# Patient Record
Sex: MALE | Race: WHITE | Employment: OTHER | URBAN - NONMETROPOLITAN AREA
[De-identification: names, ages, dates, MRNs, and addresses within clinical notes are randomized per-mention and may not be internally consistent; named-entity substitution may affect disease eponyms.]

---

## 2017-02-27 ENCOUNTER — OFFICE VISIT (OUTPATIENT)
Dept: UROLOGY | Age: 67
End: 2017-02-27

## 2017-02-27 ENCOUNTER — TELEPHONE (OUTPATIENT)
Dept: UROLOGY | Age: 67
End: 2017-02-27

## 2017-02-27 VITALS
SYSTOLIC BLOOD PRESSURE: 126 MMHG | WEIGHT: 224 LBS | DIASTOLIC BLOOD PRESSURE: 80 MMHG | HEIGHT: 69 IN | BODY MASS INDEX: 33.18 KG/M2

## 2017-02-27 DIAGNOSIS — N50.89 SCROTAL SWELLING: Primary | ICD-10-CM

## 2017-02-27 LAB
BILIRUBIN URINE: NEGATIVE
BLOOD URINE, POC: NORMAL
CHARACTER, URINE: CLEAR
COLOR, URINE: YELLOW
GLUCOSE URINE: NEGATIVE MG/DL
KETONES, URINE: NEGATIVE
LEUKOCYTE CLUMPS, URINE: NORMAL
NITRITE, URINE: NEGATIVE
PH, URINE: 5.5
PROTEIN, URINE: NEGATIVE MG/DL
SPECIFIC GRAVITY, URINE: >= 1.03 (ref 1–1.03)
UROBILINOGEN, URINE: 1 EU/DL

## 2017-02-27 PROCEDURE — 81003 URINALYSIS AUTO W/O SCOPE: CPT | Performed by: UROLOGY

## 2017-02-27 PROCEDURE — 99214 OFFICE O/P EST MOD 30 MIN: CPT | Performed by: UROLOGY

## 2017-02-27 ASSESSMENT — ENCOUNTER SYMPTOMS
SHORTNESS OF BREATH: 0
CONSTIPATION: 1
DIARRHEA: 0
CHEST TIGHTNESS: 0

## 2017-03-08 ENCOUNTER — OFFICE VISIT (OUTPATIENT)
Age: 67
End: 2017-03-08

## 2017-03-08 VITALS
HEART RATE: 57 BPM | SYSTOLIC BLOOD PRESSURE: 118 MMHG | RESPIRATION RATE: 16 BRPM | BODY MASS INDEX: 33.13 KG/M2 | TEMPERATURE: 97 F | DIASTOLIC BLOOD PRESSURE: 74 MMHG | WEIGHT: 223.7 LBS | HEIGHT: 69 IN | OXYGEN SATURATION: 96 %

## 2017-03-08 DIAGNOSIS — D17.20 LIPOMA OF SHOULDER: Primary | ICD-10-CM

## 2017-03-08 PROCEDURE — 99203 OFFICE O/P NEW LOW 30 MIN: CPT | Performed by: SURGERY

## 2017-03-08 ASSESSMENT — ENCOUNTER SYMPTOMS
ANAL BLEEDING: 0
APNEA: 0
ABDOMINAL PAIN: 0
CHOKING: 0
VOMITING: 0
COUGH: 0
RHINORRHEA: 0
SHORTNESS OF BREATH: 0
CONSTIPATION: 0
STRIDOR: 0
EYE PAIN: 0
EYE ITCHING: 0
SORE THROAT: 0
DIARRHEA: 0
SINUS PRESSURE: 0
CHEST TIGHTNESS: 0
NAUSEA: 0
WHEEZING: 0
ABDOMINAL DISTENTION: 0
EYE REDNESS: 0
EYE DISCHARGE: 0
COLOR CHANGE: 0
TROUBLE SWALLOWING: 0
BLOOD IN STOOL: 0
VOICE CHANGE: 0
RECTAL PAIN: 0
FACIAL SWELLING: 0
PHOTOPHOBIA: 0

## 2017-03-13 ENCOUNTER — OFFICE VISIT (OUTPATIENT)
Dept: CARDIOLOGY | Age: 67
End: 2017-03-13

## 2017-03-13 VITALS
SYSTOLIC BLOOD PRESSURE: 146 MMHG | WEIGHT: 224 LBS | DIASTOLIC BLOOD PRESSURE: 74 MMHG | HEART RATE: 70 BPM | HEIGHT: 71 IN | BODY MASS INDEX: 31.36 KG/M2

## 2017-03-13 DIAGNOSIS — I48.0 PAF (PAROXYSMAL ATRIAL FIBRILLATION) (HCC): ICD-10-CM

## 2017-03-13 DIAGNOSIS — R94.31 ABNORMAL EKG: ICD-10-CM

## 2017-03-13 DIAGNOSIS — I48.91 ATRIAL FIBRILLATION WITH RAPID VENTRICULAR RESPONSE (HCC): ICD-10-CM

## 2017-03-13 DIAGNOSIS — I10 ESSENTIAL HYPERTENSION: Primary | ICD-10-CM

## 2017-03-13 DIAGNOSIS — Z01.810 PREOP CARDIOVASCULAR EXAM: ICD-10-CM

## 2017-03-13 PROCEDURE — 99203 OFFICE O/P NEW LOW 30 MIN: CPT | Performed by: INTERNAL MEDICINE

## 2017-03-13 PROCEDURE — 93000 ELECTROCARDIOGRAM COMPLETE: CPT | Performed by: INTERNAL MEDICINE

## 2017-03-15 ENCOUNTER — TELEPHONE (OUTPATIENT)
Dept: UROLOGY | Age: 67
End: 2017-03-15

## 2017-04-03 ENCOUNTER — TELEPHONE (OUTPATIENT)
Dept: UROLOGY | Age: 67
End: 2017-04-03

## 2017-04-03 DIAGNOSIS — Z01.818 PRE-OP TESTING: ICD-10-CM

## 2017-04-03 DIAGNOSIS — N43.3 HYDROCELE, LEFT: Primary | ICD-10-CM

## 2017-04-28 ENCOUNTER — TELEPHONE (OUTPATIENT)
Dept: UROLOGY | Age: 67
End: 2017-04-28

## 2017-04-28 ENCOUNTER — TELEPHONE (OUTPATIENT)
Age: 67
End: 2017-04-28

## 2017-04-28 RX ORDER — OXYCODONE HYDROCHLORIDE AND ACETAMINOPHEN 5; 325 MG/1; MG/1
1-2 TABLET ORAL EVERY 6 HOURS PRN
Qty: 20 TABLET | Refills: 0 | Status: SHIPPED | OUTPATIENT
Start: 2017-04-28 | End: 2017-05-05

## 2017-05-01 ENCOUNTER — NURSE ONLY (OUTPATIENT)
Dept: UROLOGY | Age: 67
End: 2017-05-01

## 2017-05-01 DIAGNOSIS — N43.3 HYDROCELE, UNSPECIFIED HYDROCELE TYPE: Primary | ICD-10-CM

## 2017-05-10 ENCOUNTER — OFFICE VISIT (OUTPATIENT)
Dept: UROLOGY | Age: 67
End: 2017-05-10

## 2017-05-10 VITALS
SYSTOLIC BLOOD PRESSURE: 138 MMHG | BODY MASS INDEX: 30.8 KG/M2 | DIASTOLIC BLOOD PRESSURE: 76 MMHG | HEIGHT: 71 IN | WEIGHT: 220 LBS

## 2017-05-10 DIAGNOSIS — N43.3 HYDROCELE, UNSPECIFIED HYDROCELE TYPE: Primary | ICD-10-CM

## 2017-05-10 DIAGNOSIS — K59.00 CONSTIPATION, UNSPECIFIED CONSTIPATION TYPE: ICD-10-CM

## 2017-05-10 LAB
BILIRUBIN URINE: NEGATIVE
BLOOD URINE, POC: NEGATIVE
CHARACTER, URINE: CLEAR
COLOR, URINE: YELLOW
GLUCOSE URINE: NEGATIVE MG/DL
KETONES, URINE: NEGATIVE
LEUKOCYTE CLUMPS, URINE: NEGATIVE
NITRITE, URINE: NEGATIVE
PH, URINE: 5.5
PROTEIN, URINE: NEGATIVE MG/DL
SPECIFIC GRAVITY, URINE: 1.01 (ref 1–1.03)
UROBILINOGEN, URINE: 1 EU/DL

## 2017-05-10 PROCEDURE — 99024 POSTOP FOLLOW-UP VISIT: CPT | Performed by: NURSE PRACTITIONER

## 2017-05-10 PROCEDURE — 81003 URINALYSIS AUTO W/O SCOPE: CPT | Performed by: NURSE PRACTITIONER

## 2017-05-10 RX ORDER — CLOPIDOGREL BISULFATE 75 MG/1
75 TABLET ORAL DAILY
COMMUNITY
End: 2018-08-16 | Stop reason: ALTCHOICE

## 2017-05-10 RX ORDER — POLYETHYLENE GLYCOL 3350 17 G/17G
17 POWDER, FOR SOLUTION ORAL DAILY PRN
Qty: 1 BOTTLE | Refills: 0 | COMMUNITY
Start: 2017-05-10 | End: 2017-05-15

## 2017-05-10 RX ORDER — OXYCODONE HYDROCHLORIDE AND ACETAMINOPHEN 5; 325 MG/1; MG/1
2 TABLET ORAL EVERY 6 HOURS PRN
COMMUNITY
End: 2017-05-15

## 2017-05-15 ENCOUNTER — OFFICE VISIT (OUTPATIENT)
Age: 67
End: 2017-05-15

## 2017-05-15 VITALS
TEMPERATURE: 97.7 F | DIASTOLIC BLOOD PRESSURE: 80 MMHG | BODY MASS INDEX: 31.27 KG/M2 | SYSTOLIC BLOOD PRESSURE: 136 MMHG | HEIGHT: 71 IN | WEIGHT: 223.4 LBS

## 2017-05-15 DIAGNOSIS — Z98.890 S/P EXCISION OF LIPOMA: Primary | ICD-10-CM

## 2017-05-15 DIAGNOSIS — Z86.018 S/P EXCISION OF LIPOMA: Primary | ICD-10-CM

## 2017-05-15 PROCEDURE — 99024 POSTOP FOLLOW-UP VISIT: CPT | Performed by: NURSE PRACTITIONER

## 2017-05-15 RX ORDER — ASPIRIN 81 MG/1
81 TABLET ORAL DAILY
COMMUNITY

## 2017-05-15 ASSESSMENT — ENCOUNTER SYMPTOMS
EYE REDNESS: 0
ANAL BLEEDING: 0
RHINORRHEA: 0
ABDOMINAL DISTENTION: 0
WHEEZING: 0
EYE DISCHARGE: 0
BLOOD IN STOOL: 0
COUGH: 0
APNEA: 0
VOMITING: 0
STRIDOR: 0
ABDOMINAL PAIN: 0
SHORTNESS OF BREATH: 0
TROUBLE SWALLOWING: 0
COLOR CHANGE: 0
SORE THROAT: 0
EYE PAIN: 0
BACK PAIN: 0
CHEST TIGHTNESS: 0
VOICE CHANGE: 0
DIARRHEA: 0
CONSTIPATION: 0
CHOKING: 0
FACIAL SWELLING: 0
EYE ITCHING: 0
NAUSEA: 0
SINUS PRESSURE: 0
PHOTOPHOBIA: 0
RECTAL PAIN: 0

## 2017-05-24 ENCOUNTER — OFFICE VISIT (OUTPATIENT)
Dept: UROLOGY | Age: 67
End: 2017-05-24

## 2017-05-24 VITALS
HEIGHT: 70 IN | WEIGHT: 220 LBS | SYSTOLIC BLOOD PRESSURE: 128 MMHG | BODY MASS INDEX: 31.5 KG/M2 | DIASTOLIC BLOOD PRESSURE: 82 MMHG

## 2017-05-24 DIAGNOSIS — N43.2 OTHER HYDROCELE: Primary | ICD-10-CM

## 2017-05-24 PROCEDURE — 99024 POSTOP FOLLOW-UP VISIT: CPT | Performed by: NURSE PRACTITIONER

## 2017-06-07 ENCOUNTER — OFFICE VISIT (OUTPATIENT)
Dept: UROLOGY | Age: 67
End: 2017-06-07

## 2017-06-07 VITALS
DIASTOLIC BLOOD PRESSURE: 64 MMHG | WEIGHT: 220 LBS | SYSTOLIC BLOOD PRESSURE: 116 MMHG | HEIGHT: 70 IN | BODY MASS INDEX: 31.5 KG/M2

## 2017-06-07 DIAGNOSIS — N43.0 ENCYSTED HYDROCELE: Primary | ICD-10-CM

## 2017-06-07 LAB
BILIRUBIN URINE: NEGATIVE
BLOOD URINE, POC: NEGATIVE
CHARACTER, URINE: CLEAR
COLOR, URINE: YELLOW
GLUCOSE URINE: NEGATIVE MG/DL
KETONES, URINE: NEGATIVE
LEUKOCYTE CLUMPS, URINE: NEGATIVE
NITRITE, URINE: NEGATIVE
PH, URINE: 6
PROTEIN, URINE: NEGATIVE MG/DL
SPECIFIC GRAVITY, URINE: 1.02 (ref 1–1.03)
UROBILINOGEN, URINE: 1 EU/DL

## 2017-06-07 PROCEDURE — 99024 POSTOP FOLLOW-UP VISIT: CPT | Performed by: NURSE PRACTITIONER

## 2017-06-07 PROCEDURE — 81003 URINALYSIS AUTO W/O SCOPE: CPT | Performed by: NURSE PRACTITIONER

## 2017-11-09 ENCOUNTER — OFFICE VISIT (OUTPATIENT)
Dept: CARDIOLOGY CLINIC | Age: 67
End: 2017-11-09
Payer: MEDICAID

## 2017-11-09 VITALS
SYSTOLIC BLOOD PRESSURE: 124 MMHG | HEIGHT: 71 IN | HEART RATE: 84 BPM | WEIGHT: 224 LBS | BODY MASS INDEX: 31.36 KG/M2 | DIASTOLIC BLOOD PRESSURE: 68 MMHG

## 2017-11-09 DIAGNOSIS — I48.91 ATRIAL FIBRILLATION WITH RAPID VENTRICULAR RESPONSE (HCC): ICD-10-CM

## 2017-11-09 DIAGNOSIS — I10 ESSENTIAL HYPERTENSION: ICD-10-CM

## 2017-11-09 DIAGNOSIS — I48.0 PAF (PAROXYSMAL ATRIAL FIBRILLATION) (HCC): Primary | ICD-10-CM

## 2017-11-09 PROCEDURE — 99213 OFFICE O/P EST LOW 20 MIN: CPT | Performed by: INTERNAL MEDICINE

## 2017-11-09 PROCEDURE — 93000 ELECTROCARDIOGRAM COMPLETE: CPT | Performed by: INTERNAL MEDICINE

## 2017-11-09 NOTE — PROGRESS NOTES
Sadia Varghese is a 74 y.o. male is here for  and has had no chest pains . of afib and   Intensity of the pain  None  provocation of the pain none , what relieves the pain none  where does  the pain migrates to none  And no  nausea no fever and chills and no sob with and without activity. No evidence of swelling in legs no palpitations and no syncopal episodes and no dizziness ,no bleeding ,or urination  Problems ,no double visions ,no speech problems and no bowel problems or diarrhea and tolerating medications     Patient Active Problem List   Diagnosis    Hypertension    Hx of Atrial fibrillation with rapid ventricular response august 2011    CVA with RT sided hemiparesis and Broca's Aphasia    PAF (paroxysmal atrial fibrillation) with CVR on 07/12/12    Noncompliance    Hx of Chest pain-stress test negative     Encysted hydrocele    Preop cardiovascular exam    Lipoma of right upper extremity     Past Medical History:   Diagnosis Date    Atrial fibrillation with rapid ventricular response ([address]     Hypertension     Irregular heart beat     Stroke [facility]) 2012    Unspecified cerebral artery occlusion with cerebral infarction      Social History   Substance Use Topics    Smoking status: Current Every Day Smoker     Packs/day: 1.50     Years: 45.00    Smokeless tobacco: Never Used    Alcohol use Yes      Comment: couple times week     No Known Allergies  Current Outpatient Prescriptions   Medication Sig Dispense Refill    aspirin 81 MG EC tablet Take 81 mg by mouth daily      clopidogrel (PLAVIX) 75 MG tablet Take 75 mg by mouth daily      sertraline (ZOLOFT) 50 MG tablet Take 50 mg by mouth daily      atorvastatin (LIPITOR) 40 MG tablet Take 1 tablet by mouth nightly. 30 tablet 0    metoprolol (LOPRESSOR) 50 MG tablet Take 1 tablet by mouth 2 times daily. 60 tablet 0    diltiazem (CARDIZEM CD) 180 MG ER capsule Take 1 capsule by mouth daily.  30 capsule 0     No current facility-administered medications for this visit. REVIEW OF SYSTEM  Constitutional  symptoms such as fever chills and malaise and weakness  Are negative   HE ENT negative  HEART all negative  LUNGS all negative   ABDOMEN all negative  GENITAL URINARY all within normal limits  NEUROLOGY all negative  NECK all negative   SKIN all negative  MUSCULOSKELETAL negative  HEMATOLOGICAL negative  PSYCHIATRIC  negative    Vitals:    11/09/17 1535   BP: 124/68   Pulse: 84   Weight: 224 lb (101.6 kg)   Height: 5' 11\" (1.803 m)       EXAMINATION   Gen.  Appearance is reflective of nutritional normal nutrition and well-groomed   Appears  stated age    Carotid the amplitude of  carotid artery  And up stroke are present or diminished and bruits are absent   Thyroid palpation appears to be  Normal    GIBRAN  And evaluated and within normal limits  And size of pupils is normal  HEENT  teeth appears to be symmetrical without poor dentition and gums  and palate appears to be healthy and  head is normal cephalic  Without signs of trauma and eyes are without trauma no conjunctiva and Iids retracting and not retracted ptosis and no ptosis and without  erythematous changes and  Nose is symmetrical  without drainage  and ears are  without tinnitus  and throat is clear   JUGULAR VEINS  are not elevated and tongue is mid line no masses presence and no staples A waves present   LYMPHATICS are without adenopathies at least on exam   CHEST  No biventricular heaves and thrills and no right ventricular heaves and examination without signs of trauma and lesions  HEART not distant and regular rate and rhythm without   gallop signs and and no murmurs and systolic crescendo  Decrescendo  normal s1 and s2 sounds and no s3 and s4 split   Point of maximum intensity of the heartbeat  is at or displaced from the fifth intercostal space  LUNGS no   presence of intercostal retractions and no  use of the accessory muscles with a diaphragmatic movement present and not present pectus and pigeon chest and with  wheezes and with  rhonchi and non diminished in all posterior lungs  and without rales  ABDOMEN abdominal bruit not present  And  No  Presency of  morbid obesity and with no    non distended without organomegaly and no rebound tenderness and bowel sound are present  all quadrants   NEUROLOGY all the cranial nerves are intact and no motor deficits  and no sensory deficits  MUSCULAR SKELETAL without signs of trauma and kyphosis and scoliosis and normal range of motion for all extremities  INTEGUMENTARY without tenting and skin rashes indentation and  dryness  NECK without lymph adenopathy   NEUROPSYCHIATRIC has no anxiety, no mood swings and depression  VASCULAR EXAM for carotid ,radial femoral arteries are  steady  and not diminished   Extremities no evidence of peripheral edema  And pulses are  normal    Assessment and plans  1. PAF (paroxysmal atrial fibrillation) with CVR on 07/12/12  EKG 12 Lead   2. Essential hypertension  EKG 12 Lead   3. Hx of Atrial fibrillation with rapid ventricular response august 2011  EKG 12 Lead     EKG was reviewed and shows afib with rate of 73    Patient was advised to return in 3 to 6 months for follow up or sooner if there is any change in care.   Patient appears to have been atrial fibrillation previously is on Plavix and there is no data to show that he was given stronger anti-platelet medication  I have reviewed his chart julia  Concerned  that he will be at risk of embolic phenomena    And  not on stronger anticoagulation therefore I have suggested consideration for eliquis  5 twice a day but he lives on his own  We will consider stopping Plavix if patient is able to get eliquis and  Because he  is noncompliant antiplatelet such as Coumadin is not  effective in him    Patient is not on long-term anticoagulation and this is a discussed with him extensively and he understands the risk of embolic phenomena    We'll get a 12-lead EKG and assess what

## 2018-02-22 ENCOUNTER — TELEPHONE (OUTPATIENT)
Dept: CARDIOLOGY CLINIC | Age: 68
End: 2018-02-22

## 2018-07-05 ENCOUNTER — OFFICE VISIT (OUTPATIENT)
Dept: SURGERY | Age: 68
End: 2018-07-05

## 2018-07-05 ENCOUNTER — TELEPHONE (OUTPATIENT)
Dept: SURGERY | Age: 68
End: 2018-07-05

## 2018-07-05 VITALS
HEIGHT: 71 IN | DIASTOLIC BLOOD PRESSURE: 76 MMHG | RESPIRATION RATE: 18 BRPM | TEMPERATURE: 98.7 F | SYSTOLIC BLOOD PRESSURE: 138 MMHG | BODY MASS INDEX: 32.9 KG/M2 | WEIGHT: 235 LBS | OXYGEN SATURATION: 96 % | HEART RATE: 74 BPM

## 2018-07-05 DIAGNOSIS — Z12.11 ENCOUNTER FOR SCREENING COLONOSCOPY: Primary | ICD-10-CM

## 2018-07-05 PROCEDURE — 99999 PR OFFICE/OUTPT VISIT,PROCEDURE ONLY: CPT | Performed by: SURGERY

## 2018-07-05 ASSESSMENT — ENCOUNTER SYMPTOMS
ANAL BLEEDING: 0
APNEA: 0
ABDOMINAL DISTENTION: 0
ABDOMINAL PAIN: 0
COUGH: 0
EYE ITCHING: 0
WHEEZING: 0
STRIDOR: 0
SHORTNESS OF BREATH: 0
TROUBLE SWALLOWING: 0
BACK PAIN: 0
SINUS PRESSURE: 0
COLOR CHANGE: 0
VOICE CHANGE: 0
PHOTOPHOBIA: 0
CHOKING: 0
CHEST TIGHTNESS: 0
EYE REDNESS: 0
EYE PAIN: 0
SORE THROAT: 0
EYE DISCHARGE: 0
SINUS PAIN: 0
FACIAL SWELLING: 0
RHINORRHEA: 0

## 2018-07-05 NOTE — PROGRESS NOTES
[facility]  [facility]  [address] [address]  [address] [facility]  Dept: [phone]  Dept Fax: [phone]  Loc: [phone]    Visit Date: 7/5/2018    Marisol Pacheco is a 74 y.o. male who presents today for:  Chief Complaint   Patient presents with   [facility] Consult     new pt-ref. Lizzie Jimenesstorm-screening colonoscopy-no previous-family history of colon cancer---pt did not bring list of meds       HPI:     HPI 73-year-old white male whose father had colon carcinoma and who is never had a screening colonoscopy.   Patient denies any blood in his stool denies any weight loss abdominal cramping he denies any rectal prolapse or anal pain with bowel movements patient has had a stroke does have a history of atrial fibrillation he denies any change of bowel habits    Past Medical History:   Diagnosis Date    Atrial fibrillation with rapid ventricular response ([address]     Hypertension     Irregular heart beat     [facility]roke [facility]) 2012    Unspecified cerebral artery occlusion with cerebral infarction       Past Surgical History:   Procedure Laterality Date    CARDIOVASCULAR STRESS TEST  08/22/2011    EF 43% SUGGESTS ECHO    HYDROCELE EXCISION Left 12/6/13    Left Scrotal Exploration    HYDROCELE EXCISION  04/27/2017    LIPOMA RESECTION  04/27/2017    right shoulder lipoma resection    TESTICLE SURGERY  2015    TRANSTHORACIC ECHOCARDIOGRAM  08/23/2011    EF 50-55% A FIB C RVR,CONTROLLED HTN/STUDY WAS WITHIN NORMAL LIMITS     Family History   Problem Relation Age of Onset    Heart Disease Mother     Diabetes Mother     Cancer Father     Diabetes Father      Social History   Substance Use Topics    Smoking status: Current Every Day Smoker     Packs/day: 1.50     Years: 45.00    Smokeless tobacco: Never Used    Alcohol use Yes      Comment: couple times week       Current Outpatient Prescriptions   Medication Sig Dispense Refill    aspirin 81 MG EC tablet Take 81 mg by mouth daily      clopidogrel (PLAVIX) 75 MG tablet Take 75 mg by mouth daily      atorvastatin (LIPITOR) 40 MG tablet Take 1 tablet by mouth nightly. 30 tablet 0    metoprolol (LOPRESSOR) 50 MG tablet Take 1 tablet by mouth 2 times daily. 60 tablet 0    diltiazem (CARDIZEM CD) 180 MG ER capsule Take 1 capsule by mouth daily. 30 capsule 0    apixaban (ELIQUIS) 5 MG TABS tablet Take 1 tablet by mouth 2 times daily 60 tablet 5    sertraline (ZOLOFT) 50 MG tablet Take 50 mg by mouth daily       No current facility-administered medications for this visit. No Known Allergies    Subjective:      Review of Systems   Constitutional: Negative for activity change, appetite change, chills, diaphoresis, fatigue, fever and unexpected weight change. HENT: Negative for congestion, dental problem, drooling, ear discharge, ear pain, facial swelling, hearing loss, mouth sores, nosebleeds, postnasal drip, rhinorrhea, sinus pain, sinus pressure, sneezing, sore throat, tinnitus, trouble swallowing and voice change. Eyes: Negative for photophobia, pain, discharge, redness, itching and visual disturbance. Respiratory: Negative for apnea, cough, choking, chest tightness, shortness of breath, wheezing and stridor. Cardiovascular: Negative for chest pain, palpitations and leg swelling. Gastrointestinal: Negative for abdominal distention, abdominal pain and anal bleeding. Endocrine: Negative for cold intolerance, heat intolerance, polydipsia, polyphagia and polyuria. Genitourinary: Negative for decreased urine volume, difficulty urinating, discharge, dysuria, enuresis, flank pain, frequency, genital sores, hematuria, penile pain, penile swelling, scrotal swelling, testicular pain and urgency. Musculoskeletal: Negative for arthralgias, back pain, gait problem, joint swelling, myalgias, neck pain and neck stiffness. Skin: Negative for color change, pallor, rash and wound.    Allergic/Immunologic: Negative for environmental allergies, food allergies and immunocompromised state. Neurological: Negative for dizziness, tremors, seizures, syncope, facial asymmetry, speech difficulty, weakness, light-headedness, numbness and headaches. Hematological: Negative for adenopathy. Does not bruise/bleed easily. Psychiatric/Behavioral: Negative for agitation, behavioral problems, confusion, decreased concentration, dysphoric mood, hallucinations, self-injury, sleep disturbance and suicidal ideas. The patient is not nervous/anxious and is not hyperactive. Objective:   /76 (Site: Right Arm, Position: Sitting, Cuff Size: Medium Adult)   Pulse 74   Temp 98.7 °F (37.1 °C) (Tympanic)   Resp 18   Ht 5' 11\" (1.803 m)   Wt 235 lb (106.6 kg)   SpO2 96%   BMI 32.78 kg/m²     Physical Exam   Constitutional: He is oriented to person, place, and time. He appears well-developed and well-nourished. HENT:   Head: Normocephalic and atraumatic. Eyes: Conjunctivae and EOM are normal. Pupils are equal, round, and reactive to light. Left eye exhibits no discharge. No scleral icterus. Neck: No tracheal deviation present. No thyromegaly present. Cardiovascular: Normal rate, regular rhythm and normal heart sounds. Exam reveals no gallop and no friction rub. No murmur heard. Pulmonary/Chest: Effort normal and breath sounds normal. No respiratory distress. He has no wheezes. He has no rales. He exhibits no tenderness. Abdominal: He exhibits no distension. There is no tenderness. There is no rebound. Musculoskeletal: Normal range of motion. He exhibits no edema or tenderness. Lymphadenopathy:     He has no cervical adenopathy. Neurological: He is alert and oriented to person, place, and time. Skin: Skin is warm and dry. No rash noted. No erythema. No pallor. Psychiatric: He has a normal mood and affect.  His behavior is normal. Judgment and thought content normal.       Results for orders placed or performed in

## 2018-07-17 ENCOUNTER — HOSPITAL ENCOUNTER (OUTPATIENT)
Age: 68
Setting detail: OUTPATIENT SURGERY
Discharge: HOME | End: 2018-07-17
Attending: SURGERY | Admitting: SURGERY
Payer: MEDICARE

## 2018-07-17 ENCOUNTER — ANESTHESIA (OUTPATIENT)
Dept: ENDOSCOPY | Age: 68
End: 2018-07-17
Payer: MEDICARE

## 2018-07-17 ENCOUNTER — ANESTHESIA EVENT (OUTPATIENT)
Dept: ENDOSCOPY | Age: 68
End: 2018-07-17
Payer: MEDICARE

## 2018-07-17 VITALS
SYSTOLIC BLOOD PRESSURE: 126 MMHG | DIASTOLIC BLOOD PRESSURE: 71 MMHG | OXYGEN SATURATION: 96 % | RESPIRATION RATE: 17 BRPM

## 2018-07-17 VITALS
RESPIRATION RATE: 16 BRPM | DIASTOLIC BLOOD PRESSURE: 51 MMHG | OXYGEN SATURATION: 93 % | SYSTOLIC BLOOD PRESSURE: 99 MMHG | BODY MASS INDEX: 32.59 KG/M2 | WEIGHT: 232.8 LBS | TEMPERATURE: 97.2 F | HEIGHT: 71 IN | HEART RATE: 77 BPM

## 2018-07-17 PROCEDURE — 3700000000 HC ANESTHESIA ATTENDED CARE: Performed by: SURGERY

## 2018-07-17 PROCEDURE — 2580000003 HC RX 258: Performed by: SURGERY

## 2018-07-17 PROCEDURE — 45380 COLONOSCOPY AND BIOPSY: CPT | Performed by: SURGERY

## 2018-07-17 PROCEDURE — 2500000003 HC RX 250 WO HCPCS: Performed by: NURSE ANESTHETIST, CERTIFIED REGISTERED

## 2018-07-17 PROCEDURE — 88305 TISSUE EXAM BY PATHOLOGIST: CPT

## 2018-07-17 PROCEDURE — 7100000000 HC PACU RECOVERY - FIRST 15 MIN: Performed by: SURGERY

## 2018-07-17 PROCEDURE — C1773 RET DEV, INSERTABLE: HCPCS | Performed by: SURGERY

## 2018-07-17 PROCEDURE — 2709999900 HC NON-CHARGEABLE SUPPLY: Performed by: SURGERY

## 2018-07-17 PROCEDURE — 3700000001 HC ADD 15 MINUTES (ANESTHESIA): Performed by: SURGERY

## 2018-07-17 PROCEDURE — 6360000002 HC RX W HCPCS: Performed by: NURSE ANESTHETIST, CERTIFIED REGISTERED

## 2018-07-17 PROCEDURE — 7100000001 HC PACU RECOVERY - ADDTL 15 MIN: Performed by: SURGERY

## 2018-07-17 PROCEDURE — 3609010300 HC COLONOSCOPY W/BIOPSY SINGLE/MULTIPLE: Performed by: SURGERY

## 2018-07-17 RX ORDER — SODIUM CHLORIDE 450 MG/100ML
INJECTION, SOLUTION INTRAVENOUS CONTINUOUS
Status: DISCONTINUED | OUTPATIENT
Start: 2018-07-17 | End: 2018-07-17 | Stop reason: HOSPADM

## 2018-07-17 RX ORDER — LIDOCAINE HYDROCHLORIDE 20 MG/ML
INJECTION, SOLUTION INFILTRATION; PERINEURAL PRN
Status: DISCONTINUED | OUTPATIENT
Start: 2018-07-17 | End: 2018-07-17 | Stop reason: SDUPTHER

## 2018-07-17 RX ORDER — PROPOFOL 10 MG/ML
INJECTION, EMULSION INTRAVENOUS PRN
Status: DISCONTINUED | OUTPATIENT
Start: 2018-07-17 | End: 2018-07-17 | Stop reason: SDUPTHER

## 2018-07-17 RX ADMIN — LIDOCAINE HYDROCHLORIDE 100 MG: 20 INJECTION, SOLUTION INFILTRATION; PERINEURAL at 08:07

## 2018-07-17 RX ADMIN — SODIUM CHLORIDE: 4.5 INJECTION, SOLUTION INTRAVENOUS at 07:32

## 2018-07-17 RX ADMIN — PROPOFOL 100 MG: 10 INJECTION, EMULSION INTRAVENOUS at 08:15

## 2018-07-17 RX ADMIN — PROPOFOL 100 MG: 10 INJECTION, EMULSION INTRAVENOUS at 08:20

## 2018-07-17 RX ADMIN — PROPOFOL 100 MG: 10 INJECTION, EMULSION INTRAVENOUS at 08:35

## 2018-07-17 RX ADMIN — PROPOFOL 100 MG: 10 INJECTION, EMULSION INTRAVENOUS at 08:07

## 2018-07-17 RX ADMIN — PROPOFOL 100 MG: 10 INJECTION, EMULSION INTRAVENOUS at 08:12

## 2018-07-17 ASSESSMENT — COPD QUESTIONNAIRES: CAT_SEVERITY: MILD

## 2018-07-17 ASSESSMENT — PAIN SCALES - GENERAL
PAINLEVEL_OUTOF10: 0
PAINLEVEL_OUTOF10: 0

## 2018-07-17 ASSESSMENT — LIFESTYLE VARIABLES: SMOKING_STATUS: 1

## 2018-07-17 ASSESSMENT — PAIN - FUNCTIONAL ASSESSMENT: PAIN_FUNCTIONAL_ASSESSMENT: 0-10

## 2018-07-17 NOTE — ANESTHESIA POSTPROCEDURE EVALUATION
Department of Anesthesiology  Postprocedure Note    Patient: Grecia Wilhelm  MRN: [id number]  YOB: 1950  Date of evaluation: 7/17/2018  Time:  8:47 AM     Procedure Summary     Date:  07/17/18 Room / Location:  [facility] / [facility]    Anesthesia Start:  0805 Anesthesia Stop:  1028    Procedure:  COLONOSCOPY WITH BIOPSY (Left Anus) Diagnosis:  (SCREENING)    Surgeon: Danni Cosme MD Responsible Provider:  Francisco Byers DO    Anesthesia Type:  MAC ASA Status:  3          Anesthesia Type: MAC    Kita Phase I: Kita Score: 10    Kita Phase II:      Last vitals: Reviewed and per EMR flowsheets.        Anesthesia Post Evaluation    Patient location during evaluation: bedside  Patient participation: complete - patient participated  Level of consciousness: sleepy but conscious  Pain score: 0  Airway patency: patent  Nausea & Vomiting: no nausea and no vomiting  Complications: no  Cardiovascular status: blood pressure returned to baseline and hemodynamically stable  Respiratory status: acceptable  Hydration status: euvolemic

## 2018-07-17 NOTE — ANESTHESIA PRE PROCEDURE
Unspecified cerebral artery occlusion with cerebral infarction        Past Surgical History:        Procedure Laterality Date    CARDIOVASCULAR STRESS TEST  08/22/2011    EF 43% SUGGESTS ECHO    COLONOSCOPY      HYDROCELE EXCISION Left 12/6/13    Left Scrotal Exploration    HYDROCELE EXCISION  04/27/2017    LIPOMA RESECTION  04/27/2017    right shoulder lipoma resection    TESTICLE SURGERY  2015    TRANSTHORACIC ECHOCARDIOGRAM  08/23/2011    EF 50-55% A FIB C RVR,CONTROLLED HTN/STUDY WAS WITHIN NORMAL LIMITS       Social History:    Social History   Substance Use Topics    Smoking status: Current Every Day Smoker     Packs/day: 1.50     Years: 45.00    Smokeless tobacco: Never Used    Alcohol use Yes      Comment: couple times week                                Ready to quit: Not Answered  Counseling given: Not Answered      Vital Signs (Current):   Vitals:    07/17/18 0724   BP: 130/72   Pulse: 66   Resp: 18   Temp: 36.2 °C (97.2 °F)   TempSrc: Temporal   SpO2: 94%   Weight: 232 lb 12.8 oz (105.6 kg)   Height: 5' 11\" (1.803 m)                                              BP Readings from Last 3 Encounters:   07/17/18 130/72   07/05/18 138/76   11/09/17 124/68       NPO Status: Time of last liquid consumption: 2100                        Time of last solid consumption: 2100                        Date of last liquid consumption: 07/16/18                        Date of last solid food consumption: 07/15/18    BMI:   Wt Readings from Last 3 Encounters:   07/17/18 232 lb 12.8 oz (105.6 kg)   07/05/18 235 lb (106.6 kg)   11/09/17 224 lb (101.6 kg)     Body mass index is 32.47 kg/m².     CBC:   Lab Results   Component Value Date    WBC 7.5 04/18/2017    RBC 4.73 04/18/2017    RBC 4.30 08/23/2011    HGB 14.8 04/18/2017    HCT 44.4 04/18/2017    MCV 93.9 04/18/2017    RDW 14.8 04/18/2017     04/18/2017       CMP:   Lab Results   Component Value Date     04/18/2017    K 4.7 04/27/2017

## 2018-07-17 NOTE — BRIEF OP NOTE
Brief Postoperative Note    Kerry Galdamez  YOB: 1950  [id number]    Pre-operative Diagnosis: F Hx Colon Ca    Post-operative Diagnosis: Colon Polyps x 3    Procedure: Colonoscopy with Polypectomy x 3 cold forceps    Anesthesia: Deep Sedation    Surgeons/Assistants:  Jorgito Marcos    Estimated Blood Loss: less than 50     Complications: None    Specimens: Was Obtained:     Findings: see op note    Electronically signed by Antonina Garcia MD on 7/17/2018 at 8:44 AM

## 2018-08-03 DIAGNOSIS — Z12.11 ENCOUNTER FOR SCREENING COLONOSCOPY: ICD-10-CM

## 2018-08-07 ENCOUNTER — OFFICE VISIT (OUTPATIENT)
Dept: CARDIOLOGY CLINIC | Age: 68
End: 2018-08-07
Payer: MEDICARE

## 2018-08-07 VITALS
SYSTOLIC BLOOD PRESSURE: 160 MMHG | HEIGHT: 70 IN | BODY MASS INDEX: 33.93 KG/M2 | WEIGHT: 237 LBS | DIASTOLIC BLOOD PRESSURE: 72 MMHG | HEART RATE: 64 BPM

## 2018-08-07 DIAGNOSIS — I48.91 ATRIAL FIBRILLATION, UNSPECIFIED TYPE (HCC): Primary | ICD-10-CM

## 2018-08-07 PROCEDURE — G8427 DOCREV CUR MEDS BY ELIG CLIN: HCPCS | Performed by: INTERNAL MEDICINE

## 2018-08-07 PROCEDURE — G8417 CALC BMI ABV UP PARAM F/U: HCPCS | Performed by: INTERNAL MEDICINE

## 2018-08-07 PROCEDURE — 99214 OFFICE O/P EST MOD 30 MIN: CPT | Performed by: INTERNAL MEDICINE

## 2018-08-07 PROCEDURE — 4040F PNEUMOC VAC/ADMIN/RCVD: CPT | Performed by: INTERNAL MEDICINE

## 2018-08-07 PROCEDURE — 3017F COLORECTAL CA SCREEN DOC REV: CPT | Performed by: INTERNAL MEDICINE

## 2018-08-07 PROCEDURE — 1101F PT FALLS ASSESS-DOCD LE1/YR: CPT | Performed by: INTERNAL MEDICINE

## 2018-08-07 PROCEDURE — 4004F PT TOBACCO SCREEN RCVD TLK: CPT | Performed by: INTERNAL MEDICINE

## 2018-08-07 PROCEDURE — 1123F ACP DISCUSS/DSCN MKR DOCD: CPT | Performed by: INTERNAL MEDICINE

## 2018-08-07 PROCEDURE — G8598 ASA/ANTIPLAT THER USED: HCPCS | Performed by: INTERNAL MEDICINE

## 2018-08-07 ASSESSMENT — ENCOUNTER SYMPTOMS
ORTHOPNEA: 0
DIARRHEA: 0
CHANGE IN BOWEL HABIT: 0
BLURRED VISION: 0
BLOATING: 0
ABDOMINAL PAIN: 0
EYE PAIN: 0
SHORTNESS OF BREATH: 0
HOARSE VOICE: 0
CONSTIPATION: 0
HEMOPTYSIS: 0
SPUTUM PRODUCTION: 0
EYE DISCHARGE: 0
BOWEL INCONTINENCE: 0
COUGH: 0
DOUBLE VISION: 0
BACK PAIN: 0

## 2018-08-07 NOTE — PROGRESS NOTES
Encounters:   08/07/18 (!) 160/72   07/17/18 (!) 99/51   07/17/18 126/71       Physical Exam   Constitutional: He is oriented to person, place, and time. He appears well-developed and well-nourished. HENT:   Head: Normocephalic and atraumatic. Eyes: EOM are normal. Pupils are equal, round, and reactive to light. Neck: Normal range of motion. Neck supple. No JVD present. Cardiovascular: Normal rate, regular rhythm, normal heart sounds and intact distal pulses. No murmur heard. Pulmonary/Chest: Effort normal and breath sounds normal. No respiratory distress. He has no wheezes. He has no rales. Abdominal: Soft. Bowel sounds are normal. He exhibits no distension. There is no tenderness. Musculoskeletal: Normal range of motion. He exhibits no edema. Neurological: He is alert and oriented to person, place, and time. No cranial nerve deficit. Coordination normal.   Skin: Skin is warm and dry. Psychiatric: He has a normal mood and affect. Nursing note and vitals reviewed.       Lab Results   Component Value Date    CKTOTAL 65 07/10/2012    CKTOTAL 39 08/23/2011    CKTOTAL 39 08/22/2011       Lab Results   Component Value Date    WBC 7.5 04/18/2017    RBC 4.73 04/18/2017    RBC 4.30 08/23/2011    HGB 14.8 04/18/2017    HCT 44.4 04/18/2017    MCV 93.9 04/18/2017    MCH 31.3 04/18/2017    MCHC 33.3 04/18/2017    RDW 14.8 04/18/2017     04/18/2017    MPV 9.3 04/18/2017       Lab Results   Component Value Date     04/18/2017    K 4.7 04/27/2017     04/18/2017    CO2 23 04/18/2017    BUN 14 04/18/2017    LABALBU 3.8 04/18/2017    LABALBU 4.0 08/23/2011    CREATININE 0.7 04/18/2017    CALCIUM 9.4 04/18/2017    LABGLOM >90 04/18/2017    GLUCOSE 101 04/18/2017    GLUCOSE 88 08/23/2011       Lab Results   Component Value Date    ALKPHOS 122 04/18/2017    ALT 32 04/18/2017    AST 19 04/18/2017    PROT 7.1 04/18/2017    BILITOT 0.6 04/18/2017    BILIDIR 0.2 10/24/2012    LABALBU 3.8 04/18/2017 LABALBU 4.0 08/23/2011       Lab Results   Component Value Date    MG 2.3 07/16/2012       Lab Results   Component Value Date    INR 1.03 04/27/2017         Lab Results   Component Value Date    LABA1C 6.1 08/23/2011       Lab Results   Component Value Date    TRIG 81 04/18/2017    HDL 57 04/18/2017    LDLCALC 62 04/18/2017       Lab Results   Component Value Date    TSH 0.930 08/22/2011         Testing Reviewed:      I have individually reviewed the below cardiac tests    EKG: Afib with rate controlled    ECHO:   Results for orders placed during the hospital encounter of 03/28/17   ECHO Complete 2D W Doppler W Color    Narrative Transthoracic Echocardiography Report (TTE)     Demographics      Patient Name    Katarina James Gender                Male      MR #            [id number]     Race                                                      Ethnicity      Account #       [de-identified]       Room Number      Accession       [id number]     Date of Study         03/28/2017   Number      Date of Birth   1950    Referring Physician   Elza Francis      Age             61 year(s)    Sonographer           Dejah Oneil RDCS                                    Interpreting          Migdalia Willett DO                                 Physician     Procedure    Type of Study      TTE procedure:ECHOCARDIOGRAM COMPLETE 2D W DOPPLER W COLOR. Procedure Date  Date: 03/28/2017 Start: 09:12 AM    Study Location: Echo Lab  Technical Quality: Adequate visualization    Indications:Abnormal ECG. Additional Medical History:Smoker, Hypertension, Atrial fibrillation, CVA,  Chest Pain. Patient Status: Routine    Height: 71 inches Weight: 224 pounds BSA: 2.21 m^2 BMI: 31.24 kg/m^2    BP: 146/74 mmHg     Conclusions      Summary   Systolic function was normal.   Ejection fraction is visually estimated at 55%. Mildly dilated right ventricle.    Right AV Peak Velocity: 139  LVOT Peak Velocity: 94.3   cm/s                     cm/s                   cm/s                            AV Peak Gradient: 7.73 LVOT Peak Gradient: 4 mmHg   MV Peak Gradient: 2.16   mmHg   mmHg                                            TV Peak E-Wave: 70.1 cm/s      MV Deceleration Time:                           TV Peak Gradient: 1.97   208 msec                                        mmHg                            IVRT: 88 msec          TR Velocity:270 cm/s                                                   TR Gradient:29.16 mmHg                                                   PV Peak Velocity: 70.1                            AV DVI (Vmax):0.68     cm/s                                                   PV Peak Gradient: 1.97                                                   mmHg     [email or web address] [id number]       STRESS:3/28/17: Summary   Lexiscan EKG stress test is not suggestive for ischemia.   No evidence of ischemia is noted on this study.   There were no significant perfusion abnormalities. Assessment/Plan       Diagnosis Orders   1. Atrial fibrillation, unspecified type [facility])  [facility] Medication Mgmt (Anticoagulation) - [facility]'s       AFib   CVA  HTN    Denies chest pains or heart failure type of symptoms  Has Afib, CHADS2: 4, high risk   Patient's neighbour provides most of the history  Discussed about anticoagulation to decrease embolic risk  The neighbour states that the medication \"costs too much\"  There was prior documentation of patients noncompliance  Patient states that he takes all his medications regularly  Will refer to coumadin clinic; goal INR of 2-3  Will need to continue on asprin, coumadin  The patient is asked to make an attempt to improve diet and exercise patterns to aid in medical management of this problem.   Advised patient to call office

## 2018-08-09 ENCOUNTER — TELEPHONE (OUTPATIENT)
Dept: PHARMACY | Age: 68
End: 2018-08-09

## 2018-08-09 RX ORDER — WARFARIN SODIUM 5 MG/1
TABLET ORAL
Qty: 30 TABLET | Refills: 0 | Status: SHIPPED | OUTPATIENT
Start: 2018-08-09 | End: 2018-09-20 | Stop reason: SDUPTHER

## 2018-08-16 ENCOUNTER — HOSPITAL ENCOUNTER (OUTPATIENT)
Dept: PHARMACY | Age: 68
Setting detail: THERAPIES SERIES
Discharge: HOME | End: 2018-08-16
Payer: MEDICARE

## 2018-08-16 DIAGNOSIS — I48.0 PAF (PAROXYSMAL ATRIAL FIBRILLATION) (HCC): ICD-10-CM

## 2018-08-16 LAB — POC INR: 3 (ref 0.8–1.2)

## 2018-08-16 PROCEDURE — 99212 OFFICE O/P EST SF 10 MIN: CPT | Performed by: PHARMACIST

## 2018-08-16 PROCEDURE — 36416 COLLJ CAPILLARY BLOOD SPEC: CPT | Performed by: PHARMACIST

## 2018-08-16 PROCEDURE — 85610 PROTHROMBIN TIME: CPT | Performed by: PHARMACIST

## 2018-08-16 RX ORDER — DOCUSATE SODIUM 100 MG/1
100 CAPSULE, LIQUID FILLED ORAL DAILY
COMMUNITY
End: 2019-03-12 | Stop reason: ALTCHOICE

## 2018-08-16 NOTE — PROGRESS NOTES
falls? No   Any signs or symptoms of DVT/PE or stroke? No  Alcohol use: none  Tobacco use: 1.5 ppd  Diet changes as follows: No changes   Green leafy intake: likes vegetables - eats tv dinners   Grapefruit/cranberry juice: none  Upcoming surgeries or procedures: none      Review of Systems    Objective    There were no vitals filed for this visit. There is no height or weight on file to calculate BMI. Wt Readings from Last 3 Encounters:   08/07/18 237 lb (107.5 kg)   07/17/18 232 lb 12.8 oz (105.6 kg)   07/05/18 235 lb (106.6 kg)     Physical Exam    Assessment  Lab Results   Component Value Date    INR 3.00 (H) 08/16/2018    INR 1.03 04/27/2017     INR therapeutic   Recent Labs      08/16/18   1039   INR  3.00*       Plan    Hold Coumadin today, then decrease Coumadin to 2.5mg daily. Recheck INR 5 days. Patient reminded to call the Anticoagulation Clinic with any signs or symptoms of bleeding or with any medication changes. Patient given instructions utilizing the teach back method.  Printed patient teaching/instruction included with AVS.

## 2018-08-21 ENCOUNTER — HOSPITAL ENCOUNTER (OUTPATIENT)
Dept: PHARMACY | Age: 68
Setting detail: THERAPIES SERIES
Discharge: HOME | End: 2018-08-21
Payer: MEDICARE

## 2018-08-21 DIAGNOSIS — I48.0 PAF (PAROXYSMAL ATRIAL FIBRILLATION) (HCC): ICD-10-CM

## 2018-08-21 LAB — POC INR: 1.8 (ref 0.8–1.2)

## 2018-08-21 PROCEDURE — 85610 PROTHROMBIN TIME: CPT | Performed by: PHARMACIST

## 2018-08-21 PROCEDURE — 99211 OFF/OP EST MAY X REQ PHY/QHP: CPT | Performed by: PHARMACIST

## 2018-08-21 PROCEDURE — 36416 COLLJ CAPILLARY BLOOD SPEC: CPT | Performed by: PHARMACIST

## 2018-08-28 ENCOUNTER — HOSPITAL ENCOUNTER (OUTPATIENT)
Dept: PHARMACY | Age: 68
Setting detail: THERAPIES SERIES
Discharge: HOME | End: 2018-08-28
Payer: MEDICARE

## 2018-08-28 DIAGNOSIS — I48.0 PAF (PAROXYSMAL ATRIAL FIBRILLATION) (HCC): ICD-10-CM

## 2018-08-28 LAB — POC INR: 2.4 (ref 0.8–1.2)

## 2018-08-28 PROCEDURE — 99211 OFF/OP EST MAY X REQ PHY/QHP: CPT | Performed by: PHARMACIST

## 2018-08-28 PROCEDURE — 36416 COLLJ CAPILLARY BLOOD SPEC: CPT | Performed by: PHARMACIST

## 2018-08-28 PROCEDURE — 85610 PROTHROMBIN TIME: CPT | Performed by: PHARMACIST

## 2018-08-28 NOTE — PROGRESS NOTES
Medication Management [facility]  Anticoagulation Clinic  [phone] (phone)  [phone] (fax)      Mr. Marisol Pacheco is a 74 y.o.  male with history of Afib who presents today for anticoagulation monitoring and adjustment. Patient verifies current dosing regimen and tablet strength. No missed or extra doses. Patient denies s/s bleeding/bruising/swelling/SOB/chest pain  No blood in urine or stool. No dietary changes. No changes in medication/OTC agents/Herbals. No change in alcohol use or tobacco use. No change in activity level. Patient denies headaches/dizziness/lightheadedness/falls. No vomiting/diarrhea or acute illness. No Procedures scheduled in the future at this time. Assessment:   Lab Results   Component Value Date    INR 2.40 (H) 08/28/2018    INR 1.80 (H) 08/21/2018    INR 3.00 (H) 08/16/2018     INR therapeutic   Recent Labs      08/28/18   1544   INR  2.40*         Plan:  Continue Coumadin 5mg MF and 2.5mg TWThSaS. Recheck INR 9 days. Patient reminded to call the Anticoagulation Clinic with any signs or symptoms of bleeding or with any medication changes. Patient given instructions utilizing the teach back method. Discharged ambulatory in no apparent distress. After visit summary printed and reviewed with patient.       Medications reviewed and updated on home medication list Yes    Influenza vaccine:     [] given    [] declined   [] received previously   [] plans to receive at a later time   [] refused    [x] documented in EPIC

## 2018-09-06 ENCOUNTER — HOSPITAL ENCOUNTER (OUTPATIENT)
Dept: PHARMACY | Age: 68
Setting detail: THERAPIES SERIES
Discharge: HOME | End: 2018-09-06
Payer: MEDICARE

## 2018-09-06 DIAGNOSIS — I48.0 PAF (PAROXYSMAL ATRIAL FIBRILLATION) (HCC): ICD-10-CM

## 2018-09-06 LAB — POC INR: 2.4 (ref 0.8–1.2)

## 2018-09-06 PROCEDURE — 99211 OFF/OP EST MAY X REQ PHY/QHP: CPT | Performed by: PHARMACIST

## 2018-09-06 PROCEDURE — 36416 COLLJ CAPILLARY BLOOD SPEC: CPT | Performed by: PHARMACIST

## 2018-09-06 PROCEDURE — 85610 PROTHROMBIN TIME: CPT | Performed by: PHARMACIST

## 2018-09-06 NOTE — PROGRESS NOTES
Medication Management [facility]  Anticoagulation Clinic  [phone] (phone)  [phone] (fax)      Mr. Judy Jones is a 79 y.o.  male with history of Afib who presents today for anticoagulation monitoring and adjustment. Patient verifies current dosing regimen and tablet strength. No missed or extra doses. Patient denies s/s bleeding/bruising/swelling/SOB/chest pain  No blood in urine or stool. No dietary changes. No changes in medication/OTC agents/Herbals. No change in alcohol use or tobacco use; patient states that they do smoke but amount has not changed. No change in activity level. Patient denies headaches/dizziness/lightheadedness/falls. No vomiting/diarrhea or acute illness. No Procedures scheduled in the future at this time. Assessment:   Lab Results   Component Value Date    INR 2.40 (H) 09/06/2018    INR 2.40 (H) 08/28/2018    INR 1.80 (H) 08/21/2018     INR therapeutic   Recent Labs      09/06/18   1107   INR  2.40*         Plan:  Continue Coumadin 5mg MF, 2.5mg TWTHSS. Recheck INR 2 weeks. Patient reminded to call the Anticoagulation Clinic with any signs or symptoms of bleeding or with any medication changes. Patient given instructions utilizing the teach back method. Discharged ambulatory in no apparent distress. After visit summary printed and reviewed with patient.       Medications reviewed and updated on home medication list Yes    Influenza vaccine:     [] given    [x] declined   [] received previously   [x] plans to receive at a later time   [] refused    [x] documented in EPIC

## 2018-09-20 ENCOUNTER — HOSPITAL ENCOUNTER (OUTPATIENT)
Dept: PHARMACY | Age: 68
Setting detail: THERAPIES SERIES
Discharge: HOME | End: 2018-09-20
Payer: MEDICARE

## 2018-09-20 DIAGNOSIS — I48.0 PAF (PAROXYSMAL ATRIAL FIBRILLATION) (HCC): ICD-10-CM

## 2018-09-20 LAB — POC INR: 1.9 (ref 0.8–1.2)

## 2018-09-20 PROCEDURE — 85610 PROTHROMBIN TIME: CPT

## 2018-09-20 PROCEDURE — G0008 ADMIN INFLUENZA VIRUS VAC: HCPCS

## 2018-09-20 PROCEDURE — 36416 COLLJ CAPILLARY BLOOD SPEC: CPT

## 2018-09-20 PROCEDURE — G0463 HOSPITAL OUTPT CLINIC VISIT: HCPCS

## 2018-09-20 PROCEDURE — 6360000002 HC RX W HCPCS

## 2018-09-20 PROCEDURE — 90686 IIV4 VACC NO PRSV 0.5 ML IM: CPT

## 2018-09-20 RX ORDER — WARFARIN SODIUM 5 MG/1
TABLET ORAL
Qty: 30 TABLET | Refills: 11 | Status: SHIPPED | OUTPATIENT
Start: 2018-09-20 | End: 2019-08-26 | Stop reason: SDUPTHER

## 2018-09-20 RX ADMIN — INFLUENZA A VIRUS A/MICHIGAN/45/2015 X-275 (H1N1) ANTIGEN (FORMALDEHYDE INACTIVATED), INFLUENZA A VIRUS A/SINGAPORE/INFIMH-16-0019/2016 IVR-186 (H3N2) ANTIGEN (FORMALDEHYDE INACTIVATED), INFLUENZA B VIRUS B/PHUKET/3073/2013 ANTIGEN (FORMALDEHYDE INACTIVATED), AND INFLUENZA B VIRUS B/MARYLAND/15/2016 BX-69A ANTIGEN (FORMALDEHYDE INACTIVATED) 0.5 ML: 15; 15; 15; 15 INJECTION, SUSPENSION INTRAMUSCULAR at 10:03

## 2018-09-20 NOTE — PROGRESS NOTES
Medication Management [facility]  Anticoagulation Clinic  [phone] (phone)  [phone] (fax)    Mr. Kerry Galdamez is a 79 y.o.  male with history of paroxysmal Afib who presents today for anticoagulation monitoring and adjustment. Patient verifies current dosing regimen and tablet strength. No missed or extra doses. Patient denies s/s bleeding/bruising/swelling/SOB/chest pain. No blood in urine or stool. No dietary changes. No changes in medication/OTC agents/Herbals. No change in alcohol use or tobacco use. No change in activity level. Patient denies headaches. Has a little dizziness and lightheadedness sometime. No falls. No vomiting/diarrhea or acute illness. No procedures scheduled in the future at this time. Assessment:   Lab Results   Component Value Date    INR 1.90 (H) 09/20/2018    INR 2.40 (H) 09/06/2018    INR 2.40 (H) 08/28/2018     INR subtherapeutic   Recent Labs      09/20/18   0958   INR  1.90*     Plan: POCT INR ordered and result reviewed with Joie, Pharm. D. Order received and verified to increase coumadin to 5 mg po MWF, 2.5 mg po TTHSS. Recheck INR 2 weeks. Patient reminded to call the Anticoagulation Clinic with any signs or symptoms of bleeding or with any medication changes. Patient given instructions utilizing the teach back method. After obtaining consent, Fluzone given in left deltoid by this observer. Patient instructed to remain in clinic for 20 minutes afterwards, and to report any adverse reaction to staff immediately. CDC Vaccine Information Sheet given to patient for immunization(s) administered. Patient tolerated well, please see immunization note. Discharged ambulatory in no apparent distress. After visit summary printed and reviewed with patient.       Medications reviewed and updated on home medication list Yes    Influenza vaccine:     [x] given    [] declined   [] received previously   [] plans to receive at a later time   [] refused    [x] documented in EPIC

## 2018-09-26 PROBLEM — Z01.810 PREOP CARDIOVASCULAR EXAM: Status: RESOLVED | Noted: 2017-03-13 | Resolved: 2018-09-26

## 2018-10-04 ENCOUNTER — HOSPITAL ENCOUNTER (OUTPATIENT)
Dept: PHARMACY | Age: 68
Setting detail: THERAPIES SERIES
Discharge: HOME | End: 2018-10-04
Payer: MEDICARE

## 2018-10-04 DIAGNOSIS — I48.0 PAF (PAROXYSMAL ATRIAL FIBRILLATION) (HCC): ICD-10-CM

## 2018-10-04 LAB — POC INR: 2.4 (ref 0.8–1.2)

## 2018-10-04 PROCEDURE — 99211 OFF/OP EST MAY X REQ PHY/QHP: CPT

## 2018-10-04 PROCEDURE — 85610 PROTHROMBIN TIME: CPT

## 2018-10-04 PROCEDURE — 36416 COLLJ CAPILLARY BLOOD SPEC: CPT

## 2018-10-25 ENCOUNTER — HOSPITAL ENCOUNTER (OUTPATIENT)
Dept: PHARMACY | Age: 68
Setting detail: THERAPIES SERIES
Discharge: HOME | End: 2018-10-25
Payer: MEDICARE

## 2018-10-25 DIAGNOSIS — Z79.01 ANTICOAGULATED ON COUMADIN: ICD-10-CM

## 2018-10-25 DIAGNOSIS — I48.0 PAF (PAROXYSMAL ATRIAL FIBRILLATION) (HCC): ICD-10-CM

## 2018-10-25 LAB — POC INR: 1.8 (ref 0.8–1.2)

## 2018-10-25 PROCEDURE — 85610 PROTHROMBIN TIME: CPT

## 2018-10-25 PROCEDURE — 36416 COLLJ CAPILLARY BLOOD SPEC: CPT

## 2018-10-25 PROCEDURE — 99211 OFF/OP EST MAY X REQ PHY/QHP: CPT

## 2018-11-08 ENCOUNTER — HOSPITAL ENCOUNTER (OUTPATIENT)
Dept: PHARMACY | Age: 68
Setting detail: THERAPIES SERIES
Discharge: HOME | End: 2018-11-08
Payer: MEDICARE

## 2018-11-08 DIAGNOSIS — I48.0 PAF (PAROXYSMAL ATRIAL FIBRILLATION) (HCC): ICD-10-CM

## 2018-11-08 LAB — POC INR: 2.3 (ref 0.8–1.2)

## 2018-11-08 PROCEDURE — 36416 COLLJ CAPILLARY BLOOD SPEC: CPT

## 2018-11-08 PROCEDURE — 85610 PROTHROMBIN TIME: CPT

## 2018-11-08 PROCEDURE — 99211 OFF/OP EST MAY X REQ PHY/QHP: CPT

## 2018-11-08 RX ORDER — ACETAMINOPHEN 500 MG
500 TABLET ORAL EVERY 6 HOURS PRN
COMMUNITY
End: 2020-02-26

## 2018-11-08 NOTE — PROGRESS NOTES
Medication Management [facility]  Anticoagulation Clinic  [phone] (phone)  [phone] (fax)      Mr. José Miguel Ross is a 79 y.o.  male with history of Afib who presents today for anticoagulation monitoring and adjustment. Patient verifies current dosing regimen and tablet strength. No missed or extra doses. Patient denies s/s bleeding/bruising/swelling/SOB/chest pain  No blood in urine or stool. No dietary changes. Patient is taking Dulcolax for his constipation and he is going to see a doctor for that today. Also, he is taking Tylenol as needed for headache, which is not new. No change in alcohol use or tobacco use. No change in activity level. Patient denies headaches/dizziness/lightheadedness/falls. No vomiting/diarrhea or acute illness. No Procedures scheduled in the future at this time. Assessment:   Lab Results   Component Value Date    INR 2.30 (H) 11/08/2018    INR 1.80 (H) 10/25/2018    INR 2.40 (H) 10/04/2018     INR therapeutic   Recent Labs      11/08/18   1036   INR  2.30*       Plan:  Continue Coumadin 2.5 mg MoWeFr and 5 mg SuTuThSa. Recheck INR in 3 weeks. Patient reminded to call the Anticoagulation Clinic with any signs or symptoms of bleeding or with any medication changes. Patient given instructions utilizing the teach back method. Discharged ambulatory in no apparent distress. After visit summary printed and reviewed with patient.       Medications reviewed and updated on home medication list Yes    Influenza vaccine:     [] given    [] declined   [x] received previously   [] plans to receive at a later time   [] refused    [x] documented in EPIC

## 2018-11-29 ENCOUNTER — HOSPITAL ENCOUNTER (OUTPATIENT)
Dept: PHARMACY | Age: 68
Setting detail: THERAPIES SERIES
Discharge: HOME | End: 2018-11-29
Payer: MEDICARE

## 2018-11-29 DIAGNOSIS — I48.0 PAF (PAROXYSMAL ATRIAL FIBRILLATION) (HCC): ICD-10-CM

## 2018-11-29 LAB — POC INR: 2.3 (ref 0.8–1.2)

## 2018-11-29 PROCEDURE — 36416 COLLJ CAPILLARY BLOOD SPEC: CPT

## 2018-11-29 PROCEDURE — 85610 PROTHROMBIN TIME: CPT

## 2018-11-29 PROCEDURE — 99211 OFF/OP EST MAY X REQ PHY/QHP: CPT

## 2018-12-20 ENCOUNTER — HOSPITAL ENCOUNTER (OUTPATIENT)
Dept: PHARMACY | Age: 68
Setting detail: THERAPIES SERIES
Discharge: HOME | End: 2018-12-20
Payer: MEDICARE

## 2018-12-20 DIAGNOSIS — I48.0 PAF (PAROXYSMAL ATRIAL FIBRILLATION) (HCC): ICD-10-CM

## 2018-12-20 DIAGNOSIS — I48.0 PAF (PAROXYSMAL ATRIAL FIBRILLATION) (HCC): Primary | ICD-10-CM

## 2018-12-20 LAB — POC INR: 2.4 (ref 0.8–1.2)

## 2018-12-20 PROCEDURE — 85610 PROTHROMBIN TIME: CPT | Performed by: PHARMACIST

## 2018-12-20 PROCEDURE — 36416 COLLJ CAPILLARY BLOOD SPEC: CPT | Performed by: PHARMACIST

## 2018-12-20 PROCEDURE — 99211 OFF/OP EST MAY X REQ PHY/QHP: CPT | Performed by: PHARMACIST

## 2018-12-20 NOTE — PROGRESS NOTES
Medication Management [facility]  Anticoagulation Clinic  [phone] (phone)  [phone] (fax)      Mr. Chelsie Meeks is a 79 y.o.  male with history of Afib who presents today for anticoagulation monitoring and adjustment. Patient verifies current dosing regimen and tablet strength. No missed or extra doses. Patient denies s/s bleeding/bruising/swelling/SOB/chest pain  No blood in urine or stool. No dietary changes. No changes in medication/OTC agents/Herbals. No change in alcohol use or tobacco use. No change in activity level. Patient denies headaches/dizziness/lightheadedness/falls. No vomiting/diarrhea or acute illness. No Procedures scheduled in the future at this time. Assessment:   Lab Results   Component Value Date    INR 2.40 (H) 12/20/2018    INR 2.30 (H) 11/29/2018    INR 2.30 (H) 11/08/2018     INR therapeutic   Recent Labs      12/20/18   1016   INR  2.40*     Plan:  Continue Coumadin 2.5mg MWF and 5mg TThSaS. Recheck INR in 4 weeks. Pt given lab slip for H&H. Patient reminded to call the Anticoagulation Clinic with any signs or symptoms of bleeding or with any medication changes. Patient given instructions utilizing the teach back method. Discharged ambulatory in no apparent distress. After visit summary printed and reviewed with patient.       Medications reviewed and updated on home medication list Yes    Influenza vaccine:     [] given    [] declined   [x] received previously   [] plans to receive at a later time   [] refused    [x] documented in EPIC

## 2018-12-21 ENCOUNTER — HOSPITAL ENCOUNTER (OUTPATIENT)
Age: 68
Setting detail: SPECIMEN
Discharge: HOME | End: 2018-12-21
Payer: MEDICARE

## 2018-12-21 DIAGNOSIS — I48.0 PAF (PAROXYSMAL ATRIAL FIBRILLATION) (HCC): ICD-10-CM

## 2018-12-21 LAB
HCT VFR BLD CALC: 45.8 % (ref 40.7–50.3)
HEMOGLOBIN: 15.1 G/DL (ref 13–17)

## 2019-01-04 ENCOUNTER — HOSPITAL ENCOUNTER (OUTPATIENT)
Dept: GENERAL RADIOLOGY | Age: 69
Discharge: HOME | End: 2019-01-04
Payer: MEDICARE

## 2019-01-04 ENCOUNTER — HOSPITAL ENCOUNTER (OUTPATIENT)
Age: 69
Discharge: HOME | End: 2019-01-04
Payer: MEDICARE

## 2019-01-04 DIAGNOSIS — M54.2 NECK PAIN: ICD-10-CM

## 2019-01-04 DIAGNOSIS — M54.6 THORACIC BACK PAIN, UNSPECIFIED BACK PAIN LATERALITY, UNSPECIFIED CHRONICITY: ICD-10-CM

## 2019-01-04 DIAGNOSIS — M54.9 BACK PAIN, UNSPECIFIED BACK LOCATION, UNSPECIFIED BACK PAIN LATERALITY, UNSPECIFIED CHRONICITY: ICD-10-CM

## 2019-01-04 PROCEDURE — 72070 X-RAY EXAM THORAC SPINE 2VWS: CPT

## 2019-01-04 PROCEDURE — 72040 X-RAY EXAM NECK SPINE 2-3 VW: CPT

## 2019-01-04 PROCEDURE — 72110 X-RAY EXAM L-2 SPINE 4/>VWS: CPT

## 2019-01-17 ENCOUNTER — HOSPITAL ENCOUNTER (OUTPATIENT)
Dept: PHARMACY | Age: 69
Setting detail: THERAPIES SERIES
Discharge: HOME | End: 2019-01-17
Payer: MEDICARE

## 2019-01-17 DIAGNOSIS — I48.0 PAF (PAROXYSMAL ATRIAL FIBRILLATION) (HCC): ICD-10-CM

## 2019-01-17 LAB — POC INR: 2.8 (ref 0.8–1.2)

## 2019-01-17 PROCEDURE — 99211 OFF/OP EST MAY X REQ PHY/QHP: CPT

## 2019-01-17 PROCEDURE — 36416 COLLJ CAPILLARY BLOOD SPEC: CPT

## 2019-01-17 PROCEDURE — 85610 PROTHROMBIN TIME: CPT

## 2019-02-14 ENCOUNTER — HOSPITAL ENCOUNTER (OUTPATIENT)
Dept: PHARMACY | Age: 69
Setting detail: THERAPIES SERIES
Discharge: HOME | End: 2019-02-14
Payer: MEDICARE

## 2019-02-14 DIAGNOSIS — I48.0 PAF (PAROXYSMAL ATRIAL FIBRILLATION) (HCC): ICD-10-CM

## 2019-02-14 LAB — POC INR: 2.7 (ref 0.8–1.2)

## 2019-02-14 PROCEDURE — 36416 COLLJ CAPILLARY BLOOD SPEC: CPT

## 2019-02-14 PROCEDURE — 99211 OFF/OP EST MAY X REQ PHY/QHP: CPT

## 2019-02-14 PROCEDURE — 85610 PROTHROMBIN TIME: CPT

## 2019-02-28 ENCOUNTER — OFFICE VISIT (OUTPATIENT)
Dept: CARDIOLOGY CLINIC | Age: 69
End: 2019-02-28
Payer: MEDICARE

## 2019-02-28 VITALS
SYSTOLIC BLOOD PRESSURE: 128 MMHG | HEIGHT: 71 IN | WEIGHT: 238.38 LBS | BODY MASS INDEX: 33.37 KG/M2 | HEART RATE: 60 BPM | DIASTOLIC BLOOD PRESSURE: 68 MMHG

## 2019-02-28 DIAGNOSIS — I73.9 CLAUDICATION (HCC): ICD-10-CM

## 2019-02-28 DIAGNOSIS — I10 ESSENTIAL HYPERTENSION: ICD-10-CM

## 2019-02-28 DIAGNOSIS — I48.0 PAF (PAROXYSMAL ATRIAL FIBRILLATION) (HCC): Primary | ICD-10-CM

## 2019-02-28 PROCEDURE — 3017F COLORECTAL CA SCREEN DOC REV: CPT | Performed by: INTERNAL MEDICINE

## 2019-02-28 PROCEDURE — G8482 FLU IMMUNIZE ORDER/ADMIN: HCPCS | Performed by: INTERNAL MEDICINE

## 2019-02-28 PROCEDURE — 4004F PT TOBACCO SCREEN RCVD TLK: CPT | Performed by: INTERNAL MEDICINE

## 2019-02-28 PROCEDURE — G8598 ASA/ANTIPLAT THER USED: HCPCS | Performed by: INTERNAL MEDICINE

## 2019-02-28 PROCEDURE — 99213 OFFICE O/P EST LOW 20 MIN: CPT | Performed by: INTERNAL MEDICINE

## 2019-02-28 PROCEDURE — 1101F PT FALLS ASSESS-DOCD LE1/YR: CPT | Performed by: INTERNAL MEDICINE

## 2019-02-28 PROCEDURE — 1123F ACP DISCUSS/DSCN MKR DOCD: CPT | Performed by: INTERNAL MEDICINE

## 2019-02-28 PROCEDURE — G8427 DOCREV CUR MEDS BY ELIG CLIN: HCPCS | Performed by: INTERNAL MEDICINE

## 2019-02-28 PROCEDURE — 93000 ELECTROCARDIOGRAM COMPLETE: CPT | Performed by: INTERNAL MEDICINE

## 2019-02-28 PROCEDURE — G8417 CALC BMI ABV UP PARAM F/U: HCPCS | Performed by: INTERNAL MEDICINE

## 2019-02-28 PROCEDURE — 4040F PNEUMOC VAC/ADMIN/RCVD: CPT | Performed by: INTERNAL MEDICINE

## 2019-03-04 ENCOUNTER — HOSPITAL ENCOUNTER (OUTPATIENT)
Age: 69
Setting detail: SPECIMEN
Discharge: HOME | End: 2019-03-04
Payer: MEDICARE

## 2019-03-05 LAB
C. TRACHOMATIS DNA ,URINE: NEGATIVE
N. GONORRHOEAE DNA, URINE: NEGATIVE
SPECIMEN DESCRIPTION: NORMAL

## 2019-03-06 LAB
CULTURE: ABNORMAL
Lab: ABNORMAL
SPECIMEN DESCRIPTION: ABNORMAL

## 2019-03-07 LAB
SEND OUT REPORT: NORMAL
TEST NAME: NORMAL

## 2019-03-12 ENCOUNTER — OFFICE VISIT (OUTPATIENT)
Dept: PHYSICAL MEDICINE AND REHAB | Age: 69
End: 2019-03-12
Payer: MEDICARE

## 2019-03-12 VITALS
WEIGHT: 238.32 LBS | BODY MASS INDEX: 33.36 KG/M2 | SYSTOLIC BLOOD PRESSURE: 121 MMHG | HEIGHT: 71 IN | DIASTOLIC BLOOD PRESSURE: 77 MMHG | HEART RATE: 63 BPM

## 2019-03-12 DIAGNOSIS — G89.29 OTHER CHRONIC PAIN: Primary | ICD-10-CM

## 2019-03-12 PROCEDURE — 99204 OFFICE O/P NEW MOD 45 MIN: CPT | Performed by: PHYSICAL MEDICINE & REHABILITATION

## 2019-03-12 RX ORDER — LINACLOTIDE 145 UG/1
CAPSULE, GELATIN COATED ORAL SEE ADMIN INSTRUCTIONS
Refills: 4 | COMMUNITY
Start: 2019-02-13

## 2019-03-12 RX ORDER — TRAMADOL HYDROCHLORIDE 50 MG/1
50 TABLET ORAL 3 TIMES DAILY PRN
Qty: 21 TABLET | Refills: 0 | Status: SHIPPED | OUTPATIENT
Start: 2019-03-12 | End: 2019-04-04 | Stop reason: SDUPTHER

## 2019-03-12 ASSESSMENT — ENCOUNTER SYMPTOMS
TROUBLE SWALLOWING: 0
WHEEZING: 0
DIARRHEA: 0
SORE THROAT: 0
CONSTIPATION: 0
BACK PAIN: 1
EYE ITCHING: 0
EYE PAIN: 0
SHORTNESS OF BREATH: 0

## 2019-03-14 ENCOUNTER — HOSPITAL ENCOUNTER (OUTPATIENT)
Dept: PHARMACY | Age: 69
Setting detail: THERAPIES SERIES
Discharge: HOME | End: 2019-03-14
Payer: MEDICARE

## 2019-03-14 DIAGNOSIS — I48.0 PAF (PAROXYSMAL ATRIAL FIBRILLATION) (HCC): ICD-10-CM

## 2019-03-14 LAB — POC INR: 3.1 (ref 0.8–1.2)

## 2019-03-14 PROCEDURE — 99211 OFF/OP EST MAY X REQ PHY/QHP: CPT

## 2019-03-14 PROCEDURE — 36416 COLLJ CAPILLARY BLOOD SPEC: CPT

## 2019-03-14 PROCEDURE — 85610 PROTHROMBIN TIME: CPT

## 2019-04-02 ENCOUNTER — HOSPITAL ENCOUNTER (OUTPATIENT)
Dept: PHARMACY | Age: 69
Setting detail: THERAPIES SERIES
Discharge: HOME | End: 2019-04-02
Payer: MEDICARE

## 2019-04-02 DIAGNOSIS — I48.0 PAF (PAROXYSMAL ATRIAL FIBRILLATION) (HCC): ICD-10-CM

## 2019-04-02 LAB — POC INR: 3 (ref 0.8–1.2)

## 2019-04-02 PROCEDURE — 36416 COLLJ CAPILLARY BLOOD SPEC: CPT

## 2019-04-02 PROCEDURE — 99211 OFF/OP EST MAY X REQ PHY/QHP: CPT

## 2019-04-02 PROCEDURE — 85610 PROTHROMBIN TIME: CPT

## 2019-04-02 NOTE — PROGRESS NOTES
Medication Management [facility]  Anticoagulation Clinic  [phone] (phone)  [phone] (fax)      Mr. Kye Gottlieb is a 79 y.o.  male with history of paroxysmal atrial fib. , per Dr. Nixon Branch referral, who presents today for Warfarin monitoring and adjustment (3 week visit). Patient verifies current tablet strength. Followed printed instructions for dose. No missed or extra doses. Patient denies bleeding/bruising/swelling/SOB. Has usual fleeting chest pain. No blood in urine or stool. No dietary changes. Changes in medication/OTC agents/herbals: brought in pharmacy slip for Cipro 500 mg, 10 day course, dated 3/20 from  [facility]. Stressed importance of calling this clinic. He doesn't know what kind of infection he had. States today is last day because he did not get it right away. No change in alcohol use or tobacco use. No change in activity level. Patient denies falls. Has usual slight headaches - takes nothing. Has usual slight dizziness with getting out of chair. No vomiting/diarrhea or acute illness. No procedures scheduled in the future at this time. [place]. Assessment:   Lab Results   Component Value Date    INR 3.00 (H) 04/02/2019    INR 3.10 (H) 03/14/2019    INR 2.70 (H) 02/14/2019     INR therapeutic - goal 2-3. Recent Labs     04/02/19  1057   INR 3.00*       Plan:  POCT INR ordered/performed/result reviewed. 2.5 mg today, T, then continue PO Coumadin 2.5 mg MWF, 5 mg TThSS. Recheck INR in 3 weeks. (Report given - orders entered by SKYLER Howell, PharmD.)  Patient reminded to call the Anticoagulation Clinic with any signs or symptoms of bleeding or with any medication changes. Patient given instructions utilizing the teach back method. Discharged ambulatory in no apparent distress, with male significant other. After visit summary printed and reviewed with patient.       Medications reviewed and updated on home medication list. Influenza vaccine:     [] given    [] declined   [x] received previously   [] plans to receive at a later time   [] refused    [x] documented in EPIC

## 2019-04-04 DIAGNOSIS — G89.29 OTHER CHRONIC PAIN: ICD-10-CM

## 2019-04-04 RX ORDER — TRAMADOL HYDROCHLORIDE 50 MG/1
50 TABLET ORAL 3 TIMES DAILY PRN
Qty: 90 TABLET | Refills: 0 | Status: SHIPPED | OUTPATIENT
Start: 2019-04-04 | End: 2019-04-16

## 2019-04-04 NOTE — TELEPHONE ENCOUNTER
Doug Garcia called requesting a refill on the following medications:  Requested Prescriptions     Pending Prescriptions Disp Refills    traMADol (ULTRAM) 50 MG tablet 21 tablet 0     Sig: Take 1 tablet by mouth 3 times daily as needed for Pain for up to 7 days. Intended supply: 3 days.  Take lowest dose possible to manage pain     Pharmacy verified:  .[facility] pharmacy    Date of last visit: 3/12/19  Date of next visit (if applicable): 2/63/8366

## 2019-04-04 NOTE — TELEPHONE ENCOUNTER
OARRS reviewed. Last seen: 3/12/2019. Follow-up: 04/16/19    Set up refill for 30 days.  Please sign if ok

## 2019-04-16 ENCOUNTER — OFFICE VISIT (OUTPATIENT)
Dept: PHYSICAL MEDICINE AND REHAB | Age: 69
End: 2019-04-16
Payer: MEDICARE

## 2019-04-16 VITALS
WEIGHT: 238 LBS | SYSTOLIC BLOOD PRESSURE: 134 MMHG | DIASTOLIC BLOOD PRESSURE: 84 MMHG | HEART RATE: 57 BPM | HEIGHT: 71 IN | BODY MASS INDEX: 33.32 KG/M2

## 2019-04-16 DIAGNOSIS — M47.816 LUMBAR SPONDYLOSIS: Primary | ICD-10-CM

## 2019-04-16 PROCEDURE — 99214 OFFICE O/P EST MOD 30 MIN: CPT | Performed by: PHYSICAL MEDICINE & REHABILITATION

## 2019-04-16 NOTE — PROGRESS NOTES
FOLLOW UP    [address] [facility]  [address]  [address] [place] [address]  Dept: [phone]  Loc: [phone]      Yobani Mercado is a 79 y.o. male, who came in due to follow up     Cause of the symptom(s): degenerative (arthritis)    Quality of Symptoms: aching    Aggravating factors: lifting, twisting, bending forward and bending backward    Relieving factors: lying down and rest    Red Flags: unknown    Treatment done: n/a      Current pain level: 1 on the scale of 0-10 ( 10 being worst)     Overall, better after antibiotic therapy for UTI.      No Known Allergies    Social History     Socioeconomic History    Marital status:      Spouse name: Not on file    Number of children: Not on file    Years of education: Not on file    Highest education level: Not on file   Occupational History    Not on file   Social Needs    Financial resource strain: Not on file    Food insecurity:     Worry: Not on file     Inability: Not on file    Transportation needs:     Medical: Not on file     Non-medical: Not on file   Tobacco Use    Smoking status: Current Every Day Smoker     Packs/day: 1.50     Years: 45.00     Pack years: 67.50    Smokeless tobacco: Never Used   Substance and Sexual Activity    Alcohol use: Yes     Comment: couple times week    Drug use: No    Sexual activity: Not on file   Lifestyle    Physical activity:     Days per week: Not on file     Minutes per session: Not on file    Stress: Not on file   Relationships    Social connections:     Talks on phone: Not on file     Gets together: Not on file     Attends Religion service: Not on file     Active member of club or organization: Not on file     Attends meetings of clubs or organizations: Not on file     Relationship status: Not on file    Intimate partner violence:     Fear of current or ex partner: Not on file     Emotionally abused: Not on by mouth nightly Indications: Increase in the Amount of Cholesterol in the Blood  Yes Laya Jung MD   metoprolol (LOPRESSOR) 50 MG tablet Take 1 tablet by mouth 2 times daily. Patient taking differently: Take 50 mg by mouth 2 times daily Indications: Atrial Fibrillation  Yes Laya Jung MD   diltiazem (CARDIZEM CD) 180 MG ER capsule Take 1 capsule by mouth daily. Patient taking differently: Take 180 mg by mouth daily Indications: Atrial Fibrillation  Yes Laya Jung MD   traMADol (ULTRAM) 50 MG tablet Take 1 tablet by mouth 3 times daily as needed for Pain for up to 30 days. Intended supply: 3 days. Take lowest dose possible to manage pain  Rashid Oreilly MD         Review of Systems:  All systems reviewed, all unremarkable other than HPI. No change since last visit. Ht 5' 11\" (1.803 m)   Wt 238 lb (108 kg)   BMI 33.19 kg/m²     Physical Exam   Constitutional: He is oriented to person, place, and time. He appears well-developed. No distress. HENT:   Head: Normocephalic. Cardiovascular: Normal rate. Pulmonary/Chest: Effort normal and breath sounds normal.   Neurological: He is alert and oriented to person, place, and time. Neurologic Exam     Mental Status   Oriented to person, place, and time. Ortho Exam  Tender, cervical and lumbar paraspinals      Assessment:     Cervical spondylosis   Lumbar spondylosis     Plan:     Continue medication  For pain control as injection is contraindicated due to recent anticoagulation due to Afib. Time spent with patient was 25 minutes. More than 50% was spent counseling/coordinating the patient's care.      Anthony Galloway MD   Spine Medicine/PM&R

## 2019-04-23 ENCOUNTER — HOSPITAL ENCOUNTER (OUTPATIENT)
Dept: PHARMACY | Age: 69
Setting detail: THERAPIES SERIES
Discharge: HOME | End: 2019-04-23
Payer: MEDICARE

## 2019-04-23 ENCOUNTER — APPOINTMENT (OUTPATIENT)
Dept: PHARMACY | Age: 69
End: 2019-04-23
Payer: MEDICARE

## 2019-04-23 DIAGNOSIS — I48.0 PAF (PAROXYSMAL ATRIAL FIBRILLATION) (HCC): ICD-10-CM

## 2019-04-23 LAB — POC INR: 3.7 (ref 0.8–1.2)

## 2019-04-23 PROCEDURE — 85610 PROTHROMBIN TIME: CPT

## 2019-04-23 PROCEDURE — 99211 OFF/OP EST MAY X REQ PHY/QHP: CPT

## 2019-04-23 PROCEDURE — 36416 COLLJ CAPILLARY BLOOD SPEC: CPT

## 2019-04-23 NOTE — PROGRESS NOTES
Medication Management [facility]  Anticoagulation Clinic  [phone] (phone)  [phone] (fax)      Mr. Teresa Mccloud is a 79 y.o.  male with history of paroxysmal Afib who presents today for anticoagulation monitoring and adjustment. Patient verifies current dosing regimen and tablet strength. No missed or extra doses. Patient denies s/s bleeding/bruising/swelling/SOB/chest pain. No blood in urine or stool. No dietary changes. No changes in medication/OTC agents/Herbals. Was on Cipro, thinks 2-3 weeks ago. No change in alcohol use or tobacco use. No change in activity level. Patient denies headaches/dizziness/falls. Has a little lightheadedness. No vomiting/diarrhea or acute illness. No procedures scheduled in the future at this time. Assessment:   Lab Results   Component Value Date    INR 3.70 (H) 04/23/2019    INR 3.00 (H) 04/02/2019    INR 3.10 (H) 03/14/2019     INR supratherapeutic   Recent Labs     04/23/19  1459   INR 3.70*     Plan: POCT INR ordered and result reviewed with L.Do Pharm. D. Order received and verified to hold coumadin today (TU), then continue Coumadin 2.5 mg po MWF, 5 mg po TTHSS. Recheck INR in 1 weeks. Patient reminded to call the Anticoagulation Clinic with any signs or symptoms of bleeding or with any medication changes. Patient given instructions utilizing the teach back method. Discharged ambulatory in no apparent distress. After visit summary printed and reviewed with patient.       Medications reviewed and updated on home medication list Yes     Influenza vaccine:     [] given    [] declined   [x] received previously   [] plans to receive at a later time   [] refused    [x] documented in EPIC

## 2019-04-29 ENCOUNTER — HOSPITAL ENCOUNTER (OUTPATIENT)
Dept: PHARMACY | Age: 69
Setting detail: THERAPIES SERIES
Discharge: HOME | End: 2019-04-29
Payer: MEDICARE

## 2019-04-29 DIAGNOSIS — I48.0 PAF (PAROXYSMAL ATRIAL FIBRILLATION) (HCC): ICD-10-CM

## 2019-04-29 LAB — POC INR: 2.5 (ref 0.8–1.2)

## 2019-04-29 PROCEDURE — 36416 COLLJ CAPILLARY BLOOD SPEC: CPT

## 2019-04-29 PROCEDURE — 99211 OFF/OP EST MAY X REQ PHY/QHP: CPT

## 2019-04-29 PROCEDURE — 85610 PROTHROMBIN TIME: CPT

## 2019-04-29 NOTE — PROGRESS NOTES
Medication Management [facility]  Anticoagulation Clinic  [phone] (phone)  [phone] (fax)      Mr. Sascha Sigala is a 79 y.o.  male with history of Afib who presents today for anticoagulation monitoring and adjustment. Patient verifies current dosing regimen and tablet strength. No missed or extra doses. Patient denies s/s bleeding/bruising/swelling/SOB/chest pain  No blood in urine or stool. No dietary changes. No changes in medication/OTC agents/Herbals. No change in alcohol use or tobacco use. No change in activity level. Patient denies headaches/dizziness/lightheadedness/falls. No vomiting/diarrhea or acute illness. No Procedures scheduled in the future at this time. Assessment:   Lab Results   Component Value Date    INR 2.50 (H) 04/29/2019    INR 3.70 (H) 04/23/2019    INR 3.00 (H) 04/02/2019     INR therapeutic   Recent Labs     04/29/19  1103   INR 2.50*     Patient presents with therapeutic INR today. Patient has had recent history of supratherapeutic INRs. Plan:  Continue Coumadin 2.5 mg MWF and 5 mg TuThSaSu. Recheck INR in 2 weeks. Patient reminded to call the Anticoagulation Clinic with any signs or symptoms of bleeding or with any medication changes. Patient given instructions utilizing the teach back method. Discharged ambulatory in no apparent distress. After visit summary printed and reviewed with patient.       Medications reviewed and updated on home medication list Yes    Influenza vaccine:     [] given    [x] declined   [x] received previously   [] plans to receive at a later time   [] refused    [x] documented in [facility], PharmD, BCPS  4/29/2019  12:36 PM

## 2019-05-13 ENCOUNTER — HOSPITAL ENCOUNTER (OUTPATIENT)
Dept: PHARMACY | Age: 69
Setting detail: THERAPIES SERIES
Discharge: HOME | End: 2019-05-13
Payer: MEDICARE

## 2019-05-13 DIAGNOSIS — I48.0 PAF (PAROXYSMAL ATRIAL FIBRILLATION) (HCC): ICD-10-CM

## 2019-05-13 LAB — POC INR: 4.2 (ref 0.8–1.2)

## 2019-05-13 PROCEDURE — 36416 COLLJ CAPILLARY BLOOD SPEC: CPT

## 2019-05-13 PROCEDURE — 99211 OFF/OP EST MAY X REQ PHY/QHP: CPT

## 2019-05-13 PROCEDURE — 85610 PROTHROMBIN TIME: CPT

## 2019-05-13 NOTE — PROGRESS NOTES
Medication Management [facility]  Anticoagulation Clinic  [phone] (phone)  [phone] (fax)      Mr. Marianne Penny is a 79 y.o.  male with history of paroxysmal atrial fib. , per Dr. Asmita Peña referral, who presents today for Warfarin monitoring and adjustment (2 week visit). Patient verifies current tablet strength. Followed printed instructions for dose. No missed or extra doses. Patient denies bleeding/bruising/chest pain. Has usual intermittent swelling of right leg. Has usual SOB. No blood in urine or stool. No dietary changes. No changes in medication/OTC agents/herbals. No change in alcohol use or tobacco use. Change in activity level: slightly more active - been going into basement more (stairs are steep). Legs ache afterward. Patient denies headaches/falls. Has usual occasional dizziness. No vomiting/diarrhea or acute illness. No procedures scheduled in the future at this time. Assessment:   Lab Results   Component Value Date    INR 4.20 (H) 05/13/2019    INR 2.50 (H) 04/29/2019    INR 3.70 (H) 04/23/2019     INR supratherapeutic - goal 2-3. Recent Labs     05/13/19  1027   INR 4.20*     Plan:  POCT INR ordered/performed/result reviewed. Hold today and tomorrow, MT, then decrease PO Coumadin to 5 mg MWF, 2.5 mg TThSS (from 2.5 mg MWF, 5 mg TThSS=9.1% decrease). Recheck INR in 10-14 days. (Report given - orders entered by SHELLEY Henson, PharmD.)  Patient reminded to call the Anticoagulation Clinic with signs or symptoms of bleeding or with any medication changes. Patient given instructions utilizing the teach back method. Advised caution due to unusually thin blood. Discharged ambulatory in no apparent distress, with female significant other. After visit summary printed and reviewed with patient/female friend.       Medications reviewed and updated on home medication list.    Influenza vaccine:     [] given    [] declined   [] received previously   [] plans to receive at a later time   [] refused    [] documented in [facility]

## 2019-05-24 ENCOUNTER — HOSPITAL ENCOUNTER (OUTPATIENT)
Dept: PHARMACY | Age: 69
Setting detail: THERAPIES SERIES
Discharge: HOME | End: 2019-05-24
Payer: MEDICARE

## 2019-05-24 DIAGNOSIS — I48.0 PAF (PAROXYSMAL ATRIAL FIBRILLATION) (HCC): ICD-10-CM

## 2019-05-24 LAB — POC INR: 4.3 (ref 0.8–1.2)

## 2019-05-24 PROCEDURE — 99211 OFF/OP EST MAY X REQ PHY/QHP: CPT | Performed by: PHARMACIST

## 2019-05-24 PROCEDURE — 36416 COLLJ CAPILLARY BLOOD SPEC: CPT | Performed by: PHARMACIST

## 2019-05-24 PROCEDURE — 85610 PROTHROMBIN TIME: CPT | Performed by: PHARMACIST

## 2019-06-03 ENCOUNTER — HOSPITAL ENCOUNTER (OUTPATIENT)
Dept: PHARMACY | Age: 69
Setting detail: THERAPIES SERIES
Discharge: HOME | End: 2019-06-03
Payer: MEDICARE

## 2019-06-03 DIAGNOSIS — I48.91 ATRIAL FIBRILLATION WITH RAPID VENTRICULAR RESPONSE (HCC): Primary | ICD-10-CM

## 2019-06-03 DIAGNOSIS — I48.0 PAF (PAROXYSMAL ATRIAL FIBRILLATION) (HCC): ICD-10-CM

## 2019-06-03 LAB — POC INR: 2.5 (ref 0.8–1.2)

## 2019-06-03 PROCEDURE — 36416 COLLJ CAPILLARY BLOOD SPEC: CPT

## 2019-06-03 PROCEDURE — 85610 PROTHROMBIN TIME: CPT

## 2019-06-03 PROCEDURE — 99211 OFF/OP EST MAY X REQ PHY/QHP: CPT

## 2019-06-03 NOTE — PROGRESS NOTES
Medication Management [facility]  Anticoagulation Clinic  [phone] (phone)  [phone] (fax)      Mr. Vinicio Lauren is a 79 y.o.  male with history of Afib who presents today for anticoagulation monitoring and adjustment. Patient verifies current tablet strength. Patient cannot verify regimen, but states he follows the calendar. No missed or extra doses. Patient denies s/s bleeding/bruising/swelling/SOB. Patient has had some off and on chest pain. Instructed patient to contact cardiologist about this. He agrees and states will call them today. No blood in urine or stool. No dietary changes. No changes in medication/OTC agents/Herbals. No change in alcohol use or tobacco use. No change in activity level. Patient denies lightheadedness/falls. Patient has had some off and on headaches/dizziness. No vomiting/diarrhea or acute illness. No Procedures scheduled in the future at this time. Assessment:   Lab Results   Component Value Date    INR 2.50 (H) 06/03/2019    INR 4.30 (H) 05/24/2019    INR 4.20 (H) 05/13/2019     INR therapeutic   Recent Labs     06/03/19  1032   INR 2.50*     This is the patient's first therapeutic INR since dose change at last visit. Plan:  Continue Coumadin 5 mg MF and 2.5 mg TuWThSaSu. Recheck INR in 2 weeks. Patient reminded to call the Anticoagulation Clinic with any signs or symptoms of bleeding or with any medication changes. Patient given instructions utilizing the teach back method. Discharged ambulatory in no apparent distress. After visit summary printed and reviewed with patient.       Medications reviewed and updated on home medication list Yes    Influenza vaccine:     [] given    [x] declined   [x] received previously   [] plans to receive at a later time   [] refused    [x] documented in [facility], PharmD, BCPS  6/3/2019  10:58 AM

## 2019-06-12 ENCOUNTER — HOSPITAL ENCOUNTER (OUTPATIENT)
Age: 69
Setting detail: SPECIMEN
Discharge: HOME | End: 2019-06-12
Payer: MEDICARE

## 2019-06-12 DIAGNOSIS — I48.91 ATRIAL FIBRILLATION WITH RAPID VENTRICULAR RESPONSE (HCC): ICD-10-CM

## 2019-06-12 LAB
HCT VFR BLD CALC: 47.9 % (ref 40.7–50.3)
HEMOGLOBIN: 15.1 G/DL (ref 13–17)

## 2019-06-17 ENCOUNTER — HOSPITAL ENCOUNTER (OUTPATIENT)
Dept: PHARMACY | Age: 69
Setting detail: THERAPIES SERIES
Discharge: HOME | End: 2019-06-17
Payer: MEDICARE

## 2019-06-17 DIAGNOSIS — I48.0 PAF (PAROXYSMAL ATRIAL FIBRILLATION) (HCC): ICD-10-CM

## 2019-06-17 LAB — POC INR: 2.7 (ref 0.8–1.2)

## 2019-06-17 PROCEDURE — 36416 COLLJ CAPILLARY BLOOD SPEC: CPT

## 2019-06-17 PROCEDURE — 99211 OFF/OP EST MAY X REQ PHY/QHP: CPT

## 2019-06-17 PROCEDURE — 85610 PROTHROMBIN TIME: CPT

## 2019-06-17 NOTE — PROGRESS NOTES
Medication Management [facility]  Anticoagulation Clinic  [phone] (phone)  [phone] (fax)      Mr. Phoenix Bautista is a 79 y.o.  male with history of paroxysmal atrial fib. , per Dr. Kevon Kehr referral, who presents today for Warfarin monitoring and adjustment (2 week visit). Patient verifies tablet strength. Followed printed instructions for dose. No missed or extra doses. Patient denies bleeding/bruising/SOB. Has usual fleeting chest pain and intermittent left leg/foot edema. No blood in urine or stool. No dietary changes. No changes in medication/OTC agents/herbals. No change in alcohol use or tobacco use. No change in activity level. Patient denies headaches/falls. Has usual dizziness \"once-in-awhile. \"  No vomiting/diarrhea or acute illness. No procedures scheduled in the future at this time. Assessment:   Lab Results   Component Value Date    INR 2.70 (H) 06/17/2019    INR 2.50 (H) 06/03/2019    INR 4.30 (H) 05/24/2019     INR therapeutic - goal 2 -3. Recent Labs     06/17/19  0941   INR 2.70*       Plan:  POCT INR ordered/performed/result reviewed. Continue PO Coumadin 5 mg MF, 2.5 mg TWThSS. Recheck INR in 3 weeks. Patient reminded to call the Anticoagulation Clinic with any signs or symptoms of bleeding or with any medication changes. Patient given instructions utilizing the teach back method. Discharged ambulatory in no apparent distress, with male significant other. After visit summary printed and reviewed with patient.       Medications reviewed and updated on home medication list.     Influenza vaccine:     [] given    [] declined   [] received previously   [] plans to receive at a later time   [] refused    [] documented in EPIC

## 2019-07-08 ENCOUNTER — HOSPITAL ENCOUNTER (OUTPATIENT)
Dept: PHARMACY | Age: 69
Setting detail: THERAPIES SERIES
Discharge: HOME | End: 2019-07-08
Payer: MEDICARE

## 2019-07-08 DIAGNOSIS — I48.0 PAF (PAROXYSMAL ATRIAL FIBRILLATION) (HCC): ICD-10-CM

## 2019-07-08 LAB — POC INR: 2.3 (ref 0.8–1.2)

## 2019-07-08 PROCEDURE — 36416 COLLJ CAPILLARY BLOOD SPEC: CPT

## 2019-07-08 PROCEDURE — 85610 PROTHROMBIN TIME: CPT

## 2019-07-08 PROCEDURE — 99211 OFF/OP EST MAY X REQ PHY/QHP: CPT

## 2019-07-08 NOTE — PROGRESS NOTES
Medication Management [facility]  Anticoagulation Clinic  [phone] (phone)  [phone] (fax)      Mr. Zakiya Smith is a 79 y.o.  male with history of paroxysmal atrial fib. , per Dr. Antolin Ramsey referral, who presents today for Warfarin monitoring and adjustment (3 week visit). Patient verifies tablet strength. Followed printed instructions for dose. No missed or extra doses. Patient denies bleeding/bruising/SOB. Has usual intermittent left upper chest pain/swelling of feet. No blood in urine or stool. No dietary changes. No changes in medication/OTC agents/herbals. No change in alcohol use or tobacco use. No change in activity level. Patient denies headaches/falls. Has usual intermittent dizziness with position change. No vomiting/diarrhea or acute illness. No procedures scheduled in the future at this time. Sees cardiologist next week. Assessment:   Lab Results   Component Value Date    INR 2.30 (H) 07/08/2019    INR 2.70 (H) 06/17/2019    INR 2.50 (H) 06/03/2019     INR therapeutic - goal 2-3. Recent Labs     07/08/19  0959   INR 2.30*     Plan:  POCT INR ordered/performed/result reviewed. Continue PO Coumadin 5 mg MF, 2.5 mg TWThSS. Recheck INR in 4 weeks. Patient reminded to call the Anticoagulation Clinic with any signs or symptoms of bleeding or with any medication changes. Patient given instructions utilizing the teach back method. Discharged ambulatory in no apparent distress, with brother. After visit summary printed and reviewed with patient.       Medications reviewed and updated on home medication list.    Influenza vaccine:     [] given    [] declined   [] received previously   [] plans to receive at a later time   [] refused    [] documented in EPIC

## 2019-07-15 ENCOUNTER — OFFICE VISIT (OUTPATIENT)
Dept: CARDIOLOGY CLINIC | Age: 69
End: 2019-07-15
Payer: MEDICARE

## 2019-07-15 VITALS
SYSTOLIC BLOOD PRESSURE: 127 MMHG | BODY MASS INDEX: 33.1 KG/M2 | DIASTOLIC BLOOD PRESSURE: 81 MMHG | HEART RATE: 89 BPM | HEIGHT: 70 IN | WEIGHT: 231.2 LBS

## 2019-07-15 DIAGNOSIS — R07.9 CHEST PAIN, UNSPECIFIED TYPE: Primary | ICD-10-CM

## 2019-07-15 PROCEDURE — 4004F PT TOBACCO SCREEN RCVD TLK: CPT | Performed by: INTERNAL MEDICINE

## 2019-07-15 PROCEDURE — 4040F PNEUMOC VAC/ADMIN/RCVD: CPT | Performed by: INTERNAL MEDICINE

## 2019-07-15 PROCEDURE — G8598 ASA/ANTIPLAT THER USED: HCPCS | Performed by: INTERNAL MEDICINE

## 2019-07-15 PROCEDURE — G8428 CUR MEDS NOT DOCUMENT: HCPCS | Performed by: INTERNAL MEDICINE

## 2019-07-15 PROCEDURE — 1123F ACP DISCUSS/DSCN MKR DOCD: CPT | Performed by: INTERNAL MEDICINE

## 2019-07-15 PROCEDURE — G8417 CALC BMI ABV UP PARAM F/U: HCPCS | Performed by: INTERNAL MEDICINE

## 2019-07-15 PROCEDURE — 99214 OFFICE O/P EST MOD 30 MIN: CPT | Performed by: INTERNAL MEDICINE

## 2019-07-15 PROCEDURE — 3017F COLORECTAL CA SCREEN DOC REV: CPT | Performed by: INTERNAL MEDICINE

## 2019-07-15 PROCEDURE — 93000 ELECTROCARDIOGRAM COMPLETE: CPT | Performed by: INTERNAL MEDICINE

## 2019-07-15 NOTE — PROGRESS NOTES
Follow-up, chest pain. Patient follows with neurologist- Dr. Anette Yuen for stroke. He has SOB with exertion. He has intermittent dizziness, lightheadedness, palpitations and NATY. EKG completed.
LA/Aorta: 1.19                                                LA volume/Index: 106.5 ml /48m^2     Doppler Measurements & Calculations      MV Peak E-Wave: 73.5     AV Peak Velocity: 139  LVOT Peak Velocity: 94.3   cm/s                     cm/s                   cm/s                            AV Peak Gradient: 7.73 LVOT Peak Gradient: 4 mmHg   MV Peak Gradient: 2.16   mmHg   mmHg                                            TV Peak E-Wave: 70.1 cm/s      MV Deceleration Time:                           TV Peak Gradient: 1.97   208 msec                                        mmHg                            IVRT: 88 msec          TR Velocity:270 cm/s                                                   TR Gradient:29.16 mmHg                                                   PV Peak Velocity: 70.1                            AV DVI (Vmax):0.68     cm/s                                                   PV Peak Gradient: 1.97                                                   mmHg     [email or web address] [id number]       STRESS:    CATH:    Assessment/Plan       Diagnosis Orders   1. Chest pain, unspecified type  EKG 12 Lead     Chest pain  AFib on coumadin  CVA  HTN  Smoker  claudication     Reports chest pain for 3 months, worsening  Had stress test in 2017 which showed no ischemia  Has risk factors, smokes 1-3 ppd  Discussed R/B/A, patient agrees and wants to proceed  Needs coumadin stopped for 5 days prior to the procedure.    Has Afib, CHADS2: 4, high risk   Patient's neighbour provides most of the history  Discussed about anticoagulation to decrease embolic risk  The neighbour states that the medication \"costs too much\"  There was prior documentation of patients noncompliance  Patient states that he takes all his medications regularly  On coumadin and goes to coumadin clinic  No bleeding issues, understands bleeding

## 2019-08-01 ENCOUNTER — PREP FOR PROCEDURE (OUTPATIENT)
Dept: CARDIOLOGY | Age: 69
End: 2019-08-01

## 2019-08-01 RX ORDER — SODIUM CHLORIDE 9 MG/ML
INJECTION, SOLUTION INTRAVENOUS CONTINUOUS
Status: CANCELLED | OUTPATIENT
Start: 2019-08-01

## 2019-08-01 RX ORDER — SODIUM CHLORIDE 0.9 % (FLUSH) 0.9 %
10 SYRINGE (ML) INJECTION PRN
Status: CANCELLED | OUTPATIENT
Start: 2019-08-01

## 2019-08-01 RX ORDER — NITROGLYCERIN 0.4 MG/1
0.4 TABLET SUBLINGUAL EVERY 5 MIN PRN
Status: CANCELLED | OUTPATIENT
Start: 2019-08-01

## 2019-08-01 RX ORDER — SODIUM CHLORIDE 0.9 % (FLUSH) 0.9 %
10 SYRINGE (ML) INJECTION EVERY 12 HOURS SCHEDULED
Status: CANCELLED | OUTPATIENT
Start: 2019-08-01

## 2019-08-01 RX ORDER — ASPIRIN 325 MG
325 TABLET ORAL ONCE
Status: CANCELLED | OUTPATIENT
Start: 2019-08-01 | End: 2019-08-01

## 2019-08-01 RX ORDER — DIPHENHYDRAMINE HYDROCHLORIDE 50 MG/ML
50 INJECTION INTRAMUSCULAR; INTRAVENOUS ONCE
Status: CANCELLED | OUTPATIENT
Start: 2019-08-01 | End: 2019-08-01

## 2019-08-02 ENCOUNTER — HOSPITAL ENCOUNTER (OUTPATIENT)
Dept: INPATIENT UNIT | Age: 69
Discharge: HOME | End: 2019-08-02
Attending: INTERNAL MEDICINE | Admitting: INTERNAL MEDICINE
Payer: MEDICARE

## 2019-08-02 ENCOUNTER — TELEPHONE (OUTPATIENT)
Dept: PHARMACY | Age: 69
End: 2019-08-02

## 2019-08-02 VITALS
RESPIRATION RATE: 20 BRPM | HEIGHT: 70 IN | BODY MASS INDEX: 35.07 KG/M2 | OXYGEN SATURATION: 96 % | DIASTOLIC BLOOD PRESSURE: 66 MMHG | TEMPERATURE: 97.6 F | HEART RATE: 62 BPM | SYSTOLIC BLOOD PRESSURE: 127 MMHG | WEIGHT: 245 LBS

## 2019-08-02 LAB
ABO: NORMAL
ANION GAP SERPL CALCULATED.3IONS-SCNC: 13 MEQ/L (ref 8–16)
ANTIBODY SCREEN: NORMAL
APTT: 28.8 SECONDS (ref 22–38)
BUN BLDV-MCNC: 20 MG/DL (ref 7–22)
CALCIUM SERPL-MCNC: 9.2 MG/DL (ref 8.5–10.5)
CHLORIDE BLD-SCNC: 106 MEQ/L (ref 98–111)
CO2: 21 MEQ/L (ref 23–33)
CREAT SERPL-MCNC: 0.7 MG/DL (ref 0.4–1.2)
EKG ATRIAL RATE: 300 BPM
EKG Q-T INTERVAL: 394 MS
EKG QRS DURATION: 100 MS
EKG QTC CALCULATION (BAZETT): 366 MS
EKG R AXIS: -79 DEGREES
EKG T AXIS: 3 DEGREES
EKG VENTRICULAR RATE: 52 BPM
ERYTHROCYTE [DISTWIDTH] IN BLOOD BY AUTOMATED COUNT: 13.6 % (ref 11.5–14.5)
ERYTHROCYTE [DISTWIDTH] IN BLOOD BY AUTOMATED COUNT: 49 FL (ref 35–45)
GFR SERPL CREATININE-BSD FRML MDRD: > 90 ML/MIN/1.73M2
GLUCOSE BLD-MCNC: 105 MG/DL (ref 70–108)
HCT VFR BLD CALC: 46.4 % (ref 42–52)
HEMOGLOBIN: 15.4 GM/DL (ref 14–18)
INR BLD: 1.02 (ref 0.85–1.13)
MCH RBC QN AUTO: 32.1 PG (ref 26–33)
MCHC RBC AUTO-ENTMCNC: 33.2 GM/DL (ref 32.2–35.5)
MCV RBC AUTO: 96.7 FL (ref 80–94)
PLATELET # BLD: 206 THOU/MM3 (ref 130–400)
PMV BLD AUTO: 10.1 FL (ref 9.4–12.4)
POTASSIUM REFLEX MAGNESIUM: 4.2 MEQ/L (ref 3.5–5.2)
RBC # BLD: 4.8 MILL/MM3 (ref 4.7–6.1)
RH FACTOR: NORMAL
SODIUM BLD-SCNC: 140 MEQ/L (ref 135–145)
WBC # BLD: 8.5 THOU/MM3 (ref 4.8–10.8)

## 2019-08-02 PROCEDURE — 86900 BLOOD TYPING SEROLOGIC ABO: CPT

## 2019-08-02 PROCEDURE — 93005 ELECTROCARDIOGRAM TRACING: CPT | Performed by: NURSE PRACTITIONER

## 2019-08-02 PROCEDURE — 85610 PROTHROMBIN TIME: CPT

## 2019-08-02 PROCEDURE — C1769 GUIDE WIRE: HCPCS

## 2019-08-02 PROCEDURE — 93010 ELECTROCARDIOGRAM REPORT: CPT | Performed by: INTERNAL MEDICINE

## 2019-08-02 PROCEDURE — 2709999900 HC NON-CHARGEABLE SUPPLY

## 2019-08-02 PROCEDURE — 85730 THROMBOPLASTIN TIME PARTIAL: CPT

## 2019-08-02 PROCEDURE — 93458 L HRT ARTERY/VENTRICLE ANGIO: CPT | Performed by: INTERNAL MEDICINE

## 2019-08-02 PROCEDURE — C1894 INTRO/SHEATH, NON-LASER: HCPCS

## 2019-08-02 PROCEDURE — 2580000003 HC RX 258: Performed by: NURSE PRACTITIONER

## 2019-08-02 PROCEDURE — 86850 RBC ANTIBODY SCREEN: CPT

## 2019-08-02 PROCEDURE — 85027 COMPLETE CBC AUTOMATED: CPT

## 2019-08-02 PROCEDURE — 86901 BLOOD TYPING SEROLOGIC RH(D): CPT

## 2019-08-02 PROCEDURE — 6360000002 HC RX W HCPCS

## 2019-08-02 PROCEDURE — 2500000003 HC RX 250 WO HCPCS

## 2019-08-02 PROCEDURE — 6360000004 HC RX CONTRAST MEDICATION: Performed by: INTERNAL MEDICINE

## 2019-08-02 PROCEDURE — 80048 BASIC METABOLIC PNL TOTAL CA: CPT

## 2019-08-02 PROCEDURE — 36415 COLL VENOUS BLD VENIPUNCTURE: CPT

## 2019-08-02 RX ORDER — ASPIRIN 325 MG
325 TABLET ORAL ONCE
Status: DISCONTINUED | OUTPATIENT
Start: 2019-08-02 | End: 2019-08-02 | Stop reason: HOSPADM

## 2019-08-02 RX ORDER — SODIUM CHLORIDE 0.9 % (FLUSH) 0.9 %
10 SYRINGE (ML) INJECTION EVERY 12 HOURS SCHEDULED
Status: CANCELLED | OUTPATIENT
Start: 2019-08-02

## 2019-08-02 RX ORDER — SODIUM CHLORIDE 0.9 % (FLUSH) 0.9 %
10 SYRINGE (ML) INJECTION PRN
Status: DISCONTINUED | OUTPATIENT
Start: 2019-08-02 | End: 2019-08-02 | Stop reason: HOSPADM

## 2019-08-02 RX ORDER — NITROGLYCERIN 0.4 MG/1
0.4 TABLET SUBLINGUAL EVERY 5 MIN PRN
Status: DISCONTINUED | OUTPATIENT
Start: 2019-08-02 | End: 2019-08-02 | Stop reason: HOSPADM

## 2019-08-02 RX ORDER — SODIUM CHLORIDE 9 MG/ML
INJECTION, SOLUTION INTRAVENOUS CONTINUOUS
Status: DISCONTINUED | OUTPATIENT
Start: 2019-08-02 | End: 2019-08-02 | Stop reason: HOSPADM

## 2019-08-02 RX ORDER — ACETAMINOPHEN 325 MG/1
650 TABLET ORAL EVERY 4 HOURS PRN
Status: DISCONTINUED | OUTPATIENT
Start: 2019-08-02 | End: 2019-08-02 | Stop reason: HOSPADM

## 2019-08-02 RX ORDER — ONDANSETRON 2 MG/ML
4 INJECTION INTRAMUSCULAR; INTRAVENOUS EVERY 6 HOURS PRN
Status: DISCONTINUED | OUTPATIENT
Start: 2019-08-02 | End: 2019-08-02 | Stop reason: HOSPADM

## 2019-08-02 RX ORDER — SODIUM CHLORIDE 0.9 % (FLUSH) 0.9 %
10 SYRINGE (ML) INJECTION PRN
Status: DISCONTINUED | OUTPATIENT
Start: 2019-08-02 | End: 2019-08-02 | Stop reason: SDUPTHER

## 2019-08-02 RX ADMIN — SODIUM CHLORIDE: 9 INJECTION, SOLUTION INTRAVENOUS at 09:38

## 2019-08-02 RX ADMIN — IOPAMIDOL 35 ML: 755 INJECTION, SOLUTION INTRAVENOUS at 11:26

## 2019-08-02 ASSESSMENT — PAIN SCALES - GENERAL: PAINLEVEL_OUTOF10: 0

## 2019-08-02 NOTE — PROGRESS NOTES
IV site/fluids discont'd  Telemetry discont'd  Discharge instructions reviewed with pt and his neighbor/friend - they voice understanding of f/u appointments, how to care for procedure site and activity restrictions  Coumadin dosing reviewed

## 2019-08-02 NOTE — PROGRESS NOTES
Medication Management [facility]  Anticoagulation Clinic  [phone] (phone)  [phone] (fax)        Pt discharged home from [facility] after scheduled LHC. Recent Labs     08/02/19  0914   INR 1.02       Pt has 5mg Coumadin tabs at home. Coumadin (warfarin) Discharge Instructions:    Date Dose   8/2/2019  7.5mg   8/3/2019 2.5mg   8/4/2019 2.5mg     Next INR 8/5/19 (previously scheduled appt) with results to [facility].     Davon Mccormick, PharmD, BCPS 8/2/2019 12:24 PM

## 2019-08-05 ENCOUNTER — HOSPITAL ENCOUNTER (OUTPATIENT)
Dept: PHARMACY | Age: 69
Setting detail: THERAPIES SERIES
Discharge: HOME | End: 2019-08-05
Payer: MEDICARE

## 2019-08-05 DIAGNOSIS — I48.0 PAF (PAROXYSMAL ATRIAL FIBRILLATION) (HCC): ICD-10-CM

## 2019-08-05 LAB — POC INR: 1.6 (ref 0.8–1.2)

## 2019-08-05 PROCEDURE — 36416 COLLJ CAPILLARY BLOOD SPEC: CPT

## 2019-08-05 PROCEDURE — 85610 PROTHROMBIN TIME: CPT

## 2019-08-05 PROCEDURE — 99211 OFF/OP EST MAY X REQ PHY/QHP: CPT

## 2019-08-05 NOTE — PROGRESS NOTES
previously   [] plans to receive at a later time   [] refused    [] documented in [facility]

## 2019-08-15 ENCOUNTER — HOSPITAL ENCOUNTER (OUTPATIENT)
Dept: PHARMACY | Age: 69
Setting detail: THERAPIES SERIES
Discharge: HOME | End: 2019-08-15
Payer: MEDICARE

## 2019-08-15 DIAGNOSIS — I48.0 PAF (PAROXYSMAL ATRIAL FIBRILLATION) (HCC): ICD-10-CM

## 2019-08-15 LAB — POC INR: 2.2 (ref 0.8–1.2)

## 2019-08-15 PROCEDURE — 85610 PROTHROMBIN TIME: CPT

## 2019-08-15 PROCEDURE — 36416 COLLJ CAPILLARY BLOOD SPEC: CPT

## 2019-08-15 PROCEDURE — 99211 OFF/OP EST MAY X REQ PHY/QHP: CPT

## 2019-08-28 RX ORDER — WARFARIN SODIUM 5 MG/1
TABLET ORAL
Qty: 30 TABLET | Refills: 0 | Status: SHIPPED | OUTPATIENT
Start: 2019-08-28 | End: 2019-09-03 | Stop reason: SDUPTHER

## 2019-09-03 ENCOUNTER — HOSPITAL ENCOUNTER (OUTPATIENT)
Dept: PHARMACY | Age: 69
Setting detail: THERAPIES SERIES
Discharge: HOME | End: 2019-09-03
Payer: MEDICARE

## 2019-09-03 DIAGNOSIS — I48.0 PAF (PAROXYSMAL ATRIAL FIBRILLATION) (HCC): ICD-10-CM

## 2019-09-03 LAB — POC INR: 2.1 (ref 0.8–1.2)

## 2019-09-03 PROCEDURE — 36416 COLLJ CAPILLARY BLOOD SPEC: CPT

## 2019-09-03 PROCEDURE — 85610 PROTHROMBIN TIME: CPT

## 2019-09-03 PROCEDURE — 99211 OFF/OP EST MAY X REQ PHY/QHP: CPT

## 2019-09-03 RX ORDER — WARFARIN SODIUM 5 MG/1
TABLET ORAL
Qty: 90 TABLET | Refills: 3 | Status: SHIPPED | OUTPATIENT
Start: 2019-09-03 | End: 2020-08-31

## 2019-09-03 RX ORDER — WARFARIN SODIUM 5 MG/1
5 TABLET ORAL
COMMUNITY
End: 2019-10-01

## 2019-10-01 ENCOUNTER — HOSPITAL ENCOUNTER (OUTPATIENT)
Dept: PHARMACY | Age: 69
Setting detail: THERAPIES SERIES
Discharge: HOME | End: 2019-10-01
Payer: MEDICARE

## 2019-10-01 DIAGNOSIS — I48.0 PAF (PAROXYSMAL ATRIAL FIBRILLATION) (HCC): ICD-10-CM

## 2019-10-01 LAB — POC INR: 1.9 (ref 0.8–1.2)

## 2019-10-01 PROCEDURE — 99211 OFF/OP EST MAY X REQ PHY/QHP: CPT

## 2019-10-01 PROCEDURE — 6360000002 HC RX W HCPCS

## 2019-10-01 PROCEDURE — 85610 PROTHROMBIN TIME: CPT

## 2019-10-01 PROCEDURE — 36416 COLLJ CAPILLARY BLOOD SPEC: CPT

## 2019-10-01 PROCEDURE — 90686 IIV4 VACC NO PRSV 0.5 ML IM: CPT

## 2019-10-01 PROCEDURE — G0008 ADMIN INFLUENZA VIRUS VAC: HCPCS

## 2019-10-01 RX ADMIN — INFLUENZA A VIRUS A/BRISBANE/02/2018 IVR-190 (H1N1) ANTIGEN (PROPIOLACTONE INACTIVATED), INFLUENZA A VIRUS A/KANSAS/14/2017 X-327 (H3N2) ANTIGEN (PROPIOLACTONE INACTIVATED), INFLUENZA B VIRUS B/MARYLAND/15/2016 ANTIGEN (PROPIOLACTONE INACTIVATED), INFLUENZA B VIRUS B/PHUKET/3073/2013 BVR-1B ANTIGEN (PROPIOLACTONE INACTIVATED) 0.5 ML: 15; 15; 15; 15 INJECTION, SUSPENSION INTRAMUSCULAR at 10:20

## 2019-10-14 ENCOUNTER — OFFICE VISIT (OUTPATIENT)
Dept: CARDIOLOGY CLINIC | Age: 69
End: 2019-10-14
Payer: MEDICARE

## 2019-10-14 VITALS
BODY MASS INDEX: 32.87 KG/M2 | HEART RATE: 67 BPM | WEIGHT: 234.8 LBS | SYSTOLIC BLOOD PRESSURE: 128 MMHG | HEIGHT: 71 IN | DIASTOLIC BLOOD PRESSURE: 78 MMHG

## 2019-10-14 DIAGNOSIS — I48.91 ATRIAL FIBRILLATION, UNSPECIFIED TYPE (HCC): Primary | ICD-10-CM

## 2019-10-14 PROCEDURE — 99213 OFFICE O/P EST LOW 20 MIN: CPT | Performed by: INTERNAL MEDICINE

## 2019-10-22 ENCOUNTER — HOSPITAL ENCOUNTER (OUTPATIENT)
Dept: PHARMACY | Age: 69
Setting detail: THERAPIES SERIES
Discharge: HOME | End: 2019-10-22
Payer: MEDICARE

## 2019-10-22 DIAGNOSIS — I48.0 PAF (PAROXYSMAL ATRIAL FIBRILLATION) (HCC): ICD-10-CM

## 2019-10-22 LAB — POC INR: 2.2 (ref 0.8–1.2)

## 2019-10-22 PROCEDURE — 85610 PROTHROMBIN TIME: CPT

## 2019-10-22 PROCEDURE — 36416 COLLJ CAPILLARY BLOOD SPEC: CPT

## 2019-10-22 PROCEDURE — 99211 OFF/OP EST MAY X REQ PHY/QHP: CPT

## 2019-11-18 ENCOUNTER — HOSPITAL ENCOUNTER (OUTPATIENT)
Age: 69
Discharge: HOME | End: 2019-11-18
Payer: MEDICARE

## 2019-11-18 ENCOUNTER — HOSPITAL ENCOUNTER (OUTPATIENT)
Dept: PHARMACY | Age: 69
Setting detail: THERAPIES SERIES
Discharge: HOME | End: 2019-11-18
Payer: MEDICARE

## 2019-11-18 DIAGNOSIS — I48.0 PAF (PAROXYSMAL ATRIAL FIBRILLATION) (HCC): ICD-10-CM

## 2019-11-18 LAB
ALBUMIN SERPL-MCNC: 4.2 G/DL (ref 3.5–5.1)
ALP BLD-CCNC: 132 U/L (ref 38–126)
ALT SERPL-CCNC: 66 U/L (ref 11–66)
ANION GAP SERPL CALCULATED.3IONS-SCNC: 13 MEQ/L (ref 8–16)
AST SERPL-CCNC: 43 U/L (ref 5–40)
BASOPHILS # BLD: 0.9 %
BASOPHILS ABSOLUTE: 0.1 THOU/MM3 (ref 0–0.1)
BILIRUB SERPL-MCNC: 0.6 MG/DL (ref 0.3–1.2)
BUN BLDV-MCNC: 15 MG/DL (ref 7–22)
CALCIUM SERPL-MCNC: 9.6 MG/DL (ref 8.5–10.5)
CHLORIDE BLD-SCNC: 103 MEQ/L (ref 98–111)
CHOLESTEROL, TOTAL: 124 MG/DL (ref 100–199)
CO2: 24 MEQ/L (ref 23–33)
CREAT SERPL-MCNC: 0.8 MG/DL (ref 0.4–1.2)
EOSINOPHIL # BLD: 2.4 %
EOSINOPHILS ABSOLUTE: 0.2 THOU/MM3 (ref 0–0.4)
ERYTHROCYTE [DISTWIDTH] IN BLOOD BY AUTOMATED COUNT: 13.6 % (ref 11.5–14.5)
ERYTHROCYTE [DISTWIDTH] IN BLOOD BY AUTOMATED COUNT: 48.7 FL (ref 35–45)
GFR SERPL CREATININE-BSD FRML MDRD: > 90 ML/MIN/1.73M2
GLUCOSE BLD-MCNC: 105 MG/DL (ref 70–108)
HCT VFR BLD CALC: 49 % (ref 42–52)
HDLC SERPL-MCNC: 57 MG/DL
HEMOGLOBIN: 16 GM/DL (ref 14–18)
IMMATURE GRANS (ABS): 0.04 THOU/MM3 (ref 0–0.07)
IMMATURE GRANULOCYTES: 0.4 %
LDL CHOLESTEROL CALCULATED: 50 MG/DL
LYMPHOCYTES # BLD: 11.8 %
LYMPHOCYTES ABSOLUTE: 1.1 THOU/MM3 (ref 1–4.8)
MCH RBC QN AUTO: 31.6 PG (ref 26–33)
MCHC RBC AUTO-ENTMCNC: 32.7 GM/DL (ref 32.2–35.5)
MCV RBC AUTO: 96.8 FL (ref 80–94)
MONOCYTES # BLD: 8.5 %
MONOCYTES ABSOLUTE: 0.8 THOU/MM3 (ref 0.4–1.3)
NUCLEATED RED BLOOD CELLS: 0 /100 WBC
PLATELET # BLD: 197 THOU/MM3 (ref 130–400)
PMV BLD AUTO: 10.3 FL (ref 9.4–12.4)
POC INR: 1.8 (ref 0.8–1.2)
POTASSIUM SERPL-SCNC: 5.4 MEQ/L (ref 3.5–5.2)
RBC # BLD: 5.06 MILL/MM3 (ref 4.7–6.1)
SEG NEUTROPHILS: 76 %
SEGMENTED NEUTROPHILS ABSOLUTE COUNT: 6.8 THOU/MM3 (ref 1.8–7.7)
SODIUM BLD-SCNC: 140 MEQ/L (ref 135–145)
TOTAL PROTEIN: 7.5 G/DL (ref 6.1–8)
TRIGL SERPL-MCNC: 87 MG/DL (ref 0–199)
TSH SERPL DL<=0.05 MIU/L-ACNC: 1.57 UIU/ML (ref 0.4–4.2)
WBC # BLD: 9 THOU/MM3 (ref 4.8–10.8)

## 2019-11-18 PROCEDURE — 85025 COMPLETE CBC W/AUTO DIFF WBC: CPT

## 2019-11-18 PROCEDURE — 80053 COMPREHEN METABOLIC PANEL: CPT

## 2019-11-18 PROCEDURE — 36416 COLLJ CAPILLARY BLOOD SPEC: CPT

## 2019-11-18 PROCEDURE — 85610 PROTHROMBIN TIME: CPT

## 2019-11-18 PROCEDURE — 36415 COLL VENOUS BLD VENIPUNCTURE: CPT

## 2019-11-18 PROCEDURE — 84443 ASSAY THYROID STIM HORMONE: CPT

## 2019-11-18 PROCEDURE — 80061 LIPID PANEL: CPT

## 2019-11-18 PROCEDURE — 99211 OFF/OP EST MAY X REQ PHY/QHP: CPT

## 2019-12-09 ENCOUNTER — HOSPITAL ENCOUNTER (OUTPATIENT)
Dept: PHARMACY | Age: 69
Setting detail: THERAPIES SERIES
Discharge: HOME | End: 2019-12-09
Payer: MEDICARE

## 2019-12-09 DIAGNOSIS — I48.0 PAF (PAROXYSMAL ATRIAL FIBRILLATION) (HCC): ICD-10-CM

## 2019-12-09 LAB — POC INR: 1.9 (ref 0.8–1.2)

## 2019-12-09 PROCEDURE — 85610 PROTHROMBIN TIME: CPT

## 2019-12-09 PROCEDURE — 99211 OFF/OP EST MAY X REQ PHY/QHP: CPT

## 2019-12-09 PROCEDURE — 36416 COLLJ CAPILLARY BLOOD SPEC: CPT

## 2019-12-23 ENCOUNTER — HOSPITAL ENCOUNTER (OUTPATIENT)
Dept: PHARMACY | Age: 69
Setting detail: THERAPIES SERIES
Discharge: HOME | End: 2019-12-23
Payer: MEDICARE

## 2019-12-23 DIAGNOSIS — I48.0 PAF (PAROXYSMAL ATRIAL FIBRILLATION) (HCC): ICD-10-CM

## 2019-12-23 LAB — POC INR: 2.6 (ref 0.8–1.2)

## 2019-12-23 PROCEDURE — 36416 COLLJ CAPILLARY BLOOD SPEC: CPT

## 2019-12-23 PROCEDURE — 99211 OFF/OP EST MAY X REQ PHY/QHP: CPT

## 2019-12-23 PROCEDURE — 85610 PROTHROMBIN TIME: CPT

## 2020-01-06 ENCOUNTER — HOSPITAL ENCOUNTER (OUTPATIENT)
Dept: PHARMACY | Age: 70
Setting detail: THERAPIES SERIES
Discharge: HOME | End: 2020-01-06
Payer: MEDICARE

## 2020-01-06 LAB — POC INR: 2.2 (ref 0.8–1.2)

## 2020-01-06 PROCEDURE — 36416 COLLJ CAPILLARY BLOOD SPEC: CPT

## 2020-01-06 PROCEDURE — 99211 OFF/OP EST MAY X REQ PHY/QHP: CPT

## 2020-01-06 PROCEDURE — 85610 PROTHROMBIN TIME: CPT

## 2020-01-27 ENCOUNTER — TELEPHONE (OUTPATIENT)
Dept: CARDIOLOGY CLINIC | Age: 70
End: 2020-01-27

## 2020-01-27 ENCOUNTER — HOSPITAL ENCOUNTER (OUTPATIENT)
Dept: PHARMACY | Age: 70
Setting detail: THERAPIES SERIES
Discharge: HOME | End: 2020-01-27
Payer: MEDICARE

## 2020-01-27 LAB — POC INR: 1.9 (ref 0.8–1.2)

## 2020-01-27 PROCEDURE — 36416 COLLJ CAPILLARY BLOOD SPEC: CPT

## 2020-01-27 PROCEDURE — 99211 OFF/OP EST MAY X REQ PHY/QHP: CPT

## 2020-01-27 PROCEDURE — 85610 PROTHROMBIN TIME: CPT

## 2020-01-27 NOTE — PROGRESS NOTES
Medication Management [facility]  Anticoagulation Clinic  [phone] (phone)  [phone] (fax)      Mr. Ko Charles is a 81 y.o.  male with history of paroxysmal atrial fib. , per Dr. Clovis Soliz referral, who presents today for Warfarin monitoring and adjustment (3 week visit). Patient verifies current tablet strength. Followed printed instructions for dose. No missed or extra doses. Patient denies bleeding/bruising/swelling/SOB/chest pain. No blood in urine or stool. No dietary changes. No changes in medication/OTC agents/herbals. No change in alcohol use or tobacco use. No change in activity level. Patient denies headaches/dizziness/lightheadedness/falls. No vomiting/diarrhea or acute illness. No procedures scheduled in the future at this time. Assessment:   Lab Results   Component Value Date    INR 1.90 (H) 01/27/2020    INR 2.20 (H) 01/06/2020    INR 2.60 (H) 12/23/2019     INR subtherapeutic - goal 2-3. Recent Labs     01/27/20  0859   INR 1.90*       Plan:  POCT INR ordered/performed/result reviewed. 7.5 mg today, M (per policy), then continue PO Coumadin 5 mg MWF, 2.5 mg TThSS. Recheck INR in 3 weeks, per policy. Patient reminded to call the Anticoagulation Clinic with any signs or symptoms of bleeding or with any medication changes. Patient given instructions utilizing the teach back method. Discharged ambulatory in no apparent distress, with male significant other. After visit summary printed and reviewed with patient/male significant other.       Medications reviewed and updated on home medication list.    Influenza vaccine:     [] given    [x] declined   [x] received previously   [] plans to receive at a later time   [] refused    [x] documented in EPIC

## 2020-02-11 ENCOUNTER — TELEPHONE (OUTPATIENT)
Dept: PHARMACY | Age: 70
End: 2020-02-11

## 2020-02-17 ENCOUNTER — HOSPITAL ENCOUNTER (OUTPATIENT)
Dept: PHARMACY | Age: 70
Setting detail: THERAPIES SERIES
Discharge: HOME | End: 2020-02-17
Payer: MEDICARE

## 2020-02-17 PROBLEM — N39.0 URINARY TRACT INFECTION, SITE NOT SPECIFIED: Status: ACTIVE | Noted: 2020-02-11

## 2020-02-17 LAB — POC INR: 2.1 (ref 0.8–1.2)

## 2020-02-17 PROCEDURE — 85610 PROTHROMBIN TIME: CPT

## 2020-02-17 PROCEDURE — 99211 OFF/OP EST MAY X REQ PHY/QHP: CPT

## 2020-02-17 PROCEDURE — 36416 COLLJ CAPILLARY BLOOD SPEC: CPT

## 2020-02-17 RX ORDER — TAMSULOSIN HYDROCHLORIDE 0.4 MG/1
0.4 CAPSULE ORAL DAILY
COMMUNITY
End: 2020-02-26 | Stop reason: SDUPTHER

## 2020-02-17 RX ORDER — CIPROFLOXACIN 500 MG/1
500 TABLET, FILM COATED ORAL 2 TIMES DAILY
COMMUNITY
Start: 2020-02-11 | End: 2020-02-20

## 2020-02-18 ENCOUNTER — TELEPHONE (OUTPATIENT)
Dept: UROLOGY | Age: 70
End: 2020-02-18

## 2020-02-18 NOTE — TELEPHONE ENCOUNTER
This is a Sarah Knight patient that was last seen in June of 2017. He is not a new patient and can you please have him see Sarah Knight. Thank you.

## 2020-02-26 ENCOUNTER — OFFICE VISIT (OUTPATIENT)
Dept: UROLOGY | Age: 70
End: 2020-02-26
Payer: MEDICARE

## 2020-02-26 VITALS
SYSTOLIC BLOOD PRESSURE: 132 MMHG | WEIGHT: 242.9 LBS | BODY MASS INDEX: 34.77 KG/M2 | HEIGHT: 70 IN | DIASTOLIC BLOOD PRESSURE: 60 MMHG

## 2020-02-26 LAB
BILIRUBIN URINE: NEGATIVE
BLOOD URINE, POC: NORMAL
CHARACTER, URINE: CLEAR
COLOR, URINE: YELLOW
GLUCOSE URINE: NEGATIVE MG/DL
KETONES, URINE: NEGATIVE
LEUKOCYTE CLUMPS, URINE: NEGATIVE
NITRITE, URINE: NEGATIVE
PH, URINE: 5.5 (ref 5–9)
POST VOID RESIDUAL (PVR): 229 ML
PROTEIN, URINE: NEGATIVE MG/DL
SPECIFIC GRAVITY, URINE: >= 1.03 (ref 1–1.03)
UROBILINOGEN, URINE: 0.2 EU/DL (ref 0–1)

## 2020-02-26 PROCEDURE — 81003 URINALYSIS AUTO W/O SCOPE: CPT | Performed by: NURSE PRACTITIONER

## 2020-02-26 PROCEDURE — 99214 OFFICE O/P EST MOD 30 MIN: CPT | Performed by: NURSE PRACTITIONER

## 2020-02-26 PROCEDURE — 51798 US URINE CAPACITY MEASURE: CPT | Performed by: NURSE PRACTITIONER

## 2020-02-26 RX ORDER — CIPROFLOXACIN 500 MG/1
500 TABLET, FILM COATED ORAL 2 TIMES DAILY
COMMUNITY
End: 2020-03-04

## 2020-02-26 RX ORDER — TAMSULOSIN HYDROCHLORIDE 0.4 MG/1
0.4 CAPSULE ORAL 2 TIMES DAILY
Qty: 60 CAPSULE | Refills: 1 | Status: SHIPPED | OUTPATIENT
Start: 2020-02-26 | End: 2020-03-12 | Stop reason: SDUPTHER

## 2020-02-26 ASSESSMENT — ENCOUNTER SYMPTOMS
BACK PAIN: 1
ABDOMINAL PAIN: 0

## 2020-02-26 NOTE — PATIENT INSTRUCTIONS
You may receive a survey regarding the care you received during your visit. Your input is valuable to us. We encourage you to complete and return your survey. We hope you will choose us in the future for your healthcare needs. Patient Education        Stopping Smoking: Care Instructions  Your Care Instructions  Cigarette smokers crave the nicotine in cigarettes. Giving it up is much harder than simply changing a habit. Your body has to stop craving the nicotine. It is hard to quit, but you can do it. There are many tools that people use to quit smoking. You may find that combining tools works best for you. There are several steps to quitting. First you get ready to quit. Then you get support to help you. After that, you learn new skills and behaviors to become a nonsmoker. For many people, a necessary step is getting and using medicine. Your doctor will help you set up the plan that best meets your needs. You may want to attend a smoking cessation program to help you quit smoking. When you choose a program, look for one that has proven success. Ask your doctor for ideas. You will greatly increase your chances of success if you take medicine as well as get counseling or join a cessation program.  Some of the changes you feel when you first quit tobacco are uncomfortable. Your body will miss the nicotine at first, and you may feel short-tempered and grumpy. You may have trouble sleeping or concentrating. Medicine can help you deal with these symptoms. You may struggle with changing your smoking habits and rituals. The last step is the tricky one: Be prepared for the smoking urge to continue for a time. This is a lot to deal with, but keep at it. You will feel better. Follow-up care is a key part of your treatment and safety. Be sure to make and go to all appointments, and call your doctor if you are having problems.  It's also a good idea to know your test results and keep a list of the medicines you a program, look for one that has proven success. Ask your doctor for ideas. You will greatly increase your chances of success if you take medicine as well as get counseling or join a cessation program.  Some of the changes you feel when you first quit tobacco are uncomfortable. Your body will miss the nicotine at first, and you may feel short-tempered and grumpy. You may have trouble sleeping or concentrating. Medicine can help you deal with these symptoms. You may struggle with changing your smoking habits and rituals. The last step is the tricky one: Be prepared for the smoking urge to continue for a time. This is a lot to deal with, but keep at it. You will feel better. Follow-up care is a key part of your treatment and safety. Be sure to make and go to all appointments, and call your doctor if you are having problems. It's also a good idea to know your test results and keep a list of the medicines you take. How can you care for yourself at home? · Ask your family, friends, and coworkers for support. You have a better chance of quitting if you have help and support. · Join a support group, such as Nicotine Anonymous, for people who are trying to quit smoking. · Consider signing up for a smoking cessation program, such as the American Lung Association's Freedom from Smoking program.  · Get text messaging support. Go to the website at www.smokefree. gov to sign up for the [facility] program.  · Set a quit date. Pick your date carefully so that it is not right in the middle of a big deadline or stressful time. Once you quit, do not even take a puff. Get rid of all ashtrays and lighters after your last cigarette. Clean your house and your clothes so that they do not smell of smoke. · Learn how to be a nonsmoker. Think about ways you can avoid those things that make you reach for a cigarette. ? Avoid situations that put you at greatest risk for smoking.  For some people, it is hard to have a drink with friends ask your healthcare professional. [place] [address] any warranty or liability for your use of this information.

## 2020-02-27 NOTE — PROGRESS NOTES
[facility] [place] [id number]  Dept: [phone]  Loc: [phone]    Visit Date: 2/26/2020        HPI:     Sidra Morfin is a 81 y.o. male who presents today for:  Chief Complaint   Patient presents with    Dysuria     Referred from [facility]. right sided pain. HPI   Pt referred to our office for recent UTI and right low back pain. Pt reports he was recently diagnosed with a UTI and treated with Cipro by PCP. He also started Flomax on 2/11/20. He reports he finished the Cipro and pain in right low back improved but continues. He reports nocturia of 3 x per night and weakened stream.  Denies gross hematuria, fever, chills, dysuria. Notes increased urinary frequency and feeling of incomplete emptying. Current Outpatient Medications   Medication Sig Dispense Refill    ciprofloxacin (CIPRO) 500 MG tablet Take 500 mg by mouth 2 times daily      tamsulosin (FLOMAX) 0.4 MG capsule Take 1 capsule by mouth 2 times daily Started 2-. 60 capsule 1    warfarin (COUMADIN) 5 MG tablet TAKE AS DIRECTED BY COUMADIN CLINIC 90 tabs = 90 days (Patient taking differently: Take by mouth every evening Indications: Anticoagulant Therapy, Atrial Fibrillation TAKE AS DIRECTED BY COUMADIN CLINIC 90 tabs = 90 days) 90 tablet 3    LINZESS 145 MCG capsule Take by mouth See Admin Instructions Indications: Chronic Constipation   4    aspirin 81 MG EC tablet Take 81 mg by mouth daily Take with food.  sertraline (ZOLOFT) 50 MG tablet Take 50 mg by mouth daily       atorvastatin (LIPITOR) 40 MG tablet Take 1 tablet by mouth nightly. (Patient taking differently: Take 40 mg by mouth nightly Indications: Increase in the Amount of Cholesterol in the Blood ) 30 tablet 0    metoprolol (LOPRESSOR) 50 MG tablet Take 1 tablet by mouth 2 times daily.  (Patient taking differently: Take 50 mg by mouth 2 times daily Indications: Atrial negative for infection. Noted trace blood but pt had recent infection and this may be due to inflammation. Increase flomax to BID dosing. Check PSA. DARIO without nodule or induration. F/u in 2 weeks with PVR.

## 2020-03-04 ENCOUNTER — HOSPITAL ENCOUNTER (OUTPATIENT)
Dept: PHARMACY | Age: 70
Setting detail: THERAPIES SERIES
Discharge: HOME | End: 2020-03-04
Payer: MEDICARE

## 2020-03-04 ENCOUNTER — HOSPITAL ENCOUNTER (OUTPATIENT)
Age: 70
Discharge: HOME | End: 2020-03-04
Payer: MEDICARE

## 2020-03-04 LAB
POC INR: 2.6 (ref 0.8–1.2)
PROSTATE SPECIFIC ANTIGEN: 1.34 NG/ML (ref 0–1)

## 2020-03-04 PROCEDURE — 84153 ASSAY OF PSA TOTAL: CPT

## 2020-03-04 PROCEDURE — 99211 OFF/OP EST MAY X REQ PHY/QHP: CPT

## 2020-03-04 PROCEDURE — 36416 COLLJ CAPILLARY BLOOD SPEC: CPT

## 2020-03-04 PROCEDURE — 36415 COLL VENOUS BLD VENIPUNCTURE: CPT

## 2020-03-04 PROCEDURE — 85610 PROTHROMBIN TIME: CPT

## 2020-03-12 ENCOUNTER — OFFICE VISIT (OUTPATIENT)
Dept: UROLOGY | Age: 70
End: 2020-03-12
Payer: MEDICARE

## 2020-03-12 ENCOUNTER — TELEPHONE (OUTPATIENT)
Dept: UROLOGY | Age: 70
End: 2020-03-12

## 2020-03-12 VITALS
SYSTOLIC BLOOD PRESSURE: 138 MMHG | DIASTOLIC BLOOD PRESSURE: 62 MMHG | WEIGHT: 243.3 LBS | BODY MASS INDEX: 34.83 KG/M2 | HEIGHT: 70 IN

## 2020-03-12 LAB
BACTERIA: ABNORMAL
BILIRUBIN URINE: NEGATIVE
BILIRUBIN URINE: NEGATIVE
BLOOD URINE, POC: NORMAL
BLOOD, URINE: NEGATIVE
CASTS: ABNORMAL /LPF
CASTS: ABNORMAL /LPF
CHARACTER, URINE: ABNORMAL
CHARACTER, URINE: CLEAR
COLOR, URINE: YELLOW
COLOR: YELLOW
CRYSTALS: ABNORMAL
EPITHELIAL CELLS, UA: ABNORMAL /HPF
GLUCOSE URINE: NEGATIVE MG/DL
GLUCOSE, URINE: NEGATIVE MG/DL
KETONES, URINE: ABNORMAL
KETONES, URINE: NEGATIVE
LEUKOCYTE CLUMPS, URINE: NEGATIVE
LEUKOCYTE ESTERASE, URINE: NEGATIVE
MISCELLANEOUS LAB TEST RESULT: ABNORMAL
NITRITE, URINE: NEGATIVE
NITRITE, URINE: NEGATIVE
PH UA: 5.5 (ref 5–9)
PH, URINE: 5.5 (ref 5–9)
POST VOID RESIDUAL (PVR): 173 ML
PROTEIN UA: NEGATIVE MG/DL
PROTEIN, URINE: NEGATIVE MG/DL
RBC URINE: ABNORMAL /HPF
RENAL EPITHELIAL, UA: ABNORMAL
SPECIFIC GRAVITY UA: 1.03 (ref 1–1.03)
SPECIFIC GRAVITY, URINE: 1.02 (ref 1–1.03)
UROBILINOGEN, URINE: 1 EU/DL (ref 0–1)
UROBILINOGEN, URINE: 1 EU/DL (ref 0–1)
WBC UA: ABNORMAL /HPF
YEAST: ABNORMAL

## 2020-03-12 PROCEDURE — 51798 US URINE CAPACITY MEASURE: CPT | Performed by: NURSE PRACTITIONER

## 2020-03-12 PROCEDURE — 81003 URINALYSIS AUTO W/O SCOPE: CPT | Performed by: NURSE PRACTITIONER

## 2020-03-12 PROCEDURE — 99213 OFFICE O/P EST LOW 20 MIN: CPT | Performed by: NURSE PRACTITIONER

## 2020-03-12 RX ORDER — TAMSULOSIN HYDROCHLORIDE 0.4 MG/1
0.4 CAPSULE ORAL 2 TIMES DAILY
Qty: 60 CAPSULE | Refills: 1 | Status: SHIPPED | OUTPATIENT
Start: 2020-03-12 | End: 2020-07-09

## 2020-03-12 ASSESSMENT — ENCOUNTER SYMPTOMS
BACK PAIN: 0
ABDOMINAL PAIN: 0

## 2020-03-12 NOTE — PROGRESS NOTES
General:         Right eye: No discharge. Left eye: No discharge. Cardiovascular:      Rate and Rhythm: Normal rate and regular rhythm. Pulmonary:      Effort: Pulmonary effort is normal. No respiratory distress. Abdominal:      General: There is no distension. Tenderness: There is no abdominal tenderness. Musculoskeletal: Normal range of motion. Skin:     General: Skin is warm and dry. Capillary Refill: Capillary refill takes less than 2 seconds. Neurological:      General: No focal deficit present. Mental Status: He is alert. Psychiatric:         Mood and Affect: Mood normal.         POC  Results for POC orders placed in visit on 03/12/20   POCT Urinalysis No Micro (Auto)   Result Value Ref Range    Glucose, Ur Negative NEGATIVE mg/dl    Bilirubin Urine Negative     Ketones, Urine Negative NEGATIVE    Specific Gravity, Urine 1.025 1.002 - 1.03    Blood, UA POC Trace-intact NEGATIVE    pH, Urine 5.50 5.0 - 9.0    Protein, Urine Negative NEGATIVE mg/dl    Urobilinogen, Urine 1.00 0.0 - 1.0 eu/dl    Nitrite, Urine Negative NEGATIVE    Leukocyte Clumps, Urine Negative NEGATIVE    Color, Urine Yellow YELLOW-STR    Character, Urine Clear [id number]   poct post void residual   Result Value Ref Range    post void residual 173 ml         Patients recent PSA values are as follows  Lab Results   Component Value Date    PSA 1.34 (H) 03/04/2020        Recent BUN/Creatinine:  Lab Results   Component Value Date    BUN 15 11/18/2019    CREATININE 0.8 11/18/2019         Assessment:   BPH with LUTs and urinary retention  R low back pain, improved  Recent UTI  Micro hematuria  Current tobacco user  Hx of R hydrocele sp hydrocelectomy    Plan:     PVR today improved and pt notes improvement in symptoms. He notes continued urinary frequency of every 2 hours. Discussed considering office cystoscopy but pt would like to hold off and see if he has further improvement yet on the Flomax.   Urine dip

## 2020-03-12 NOTE — PATIENT INSTRUCTIONS
take.  How can you care for yourself at home? · Ask your family, friends, and coworkers for support. You have a better chance of quitting if you have help and support. · Join a support group, such as Nicotine Anonymous, for people who are trying to quit smoking. · Consider signing up for a smoking cessation program, such as the American Lung Association's Freedom from Smoking program.  · Get text messaging support. Go to the website at www.smokefree. gov to sign up for the [facility] program.  · Set a quit date. Pick your date carefully so that it is not right in the middle of a big deadline or stressful time. Once you quit, do not even take a puff. Get rid of all ashtrays and lighters after your last cigarette. Clean your house and your clothes so that they do not smell of smoke. · Learn how to be a nonsmoker. Think about ways you can avoid those things that make you reach for a cigarette. ? Avoid situations that put you at greatest risk for smoking. For some people, it is hard to have a drink with friends without smoking. For others, they might skip a coffee break with coworkers who smoke. ? Change your daily routine. Take a different route to work or eat a meal in a different place. · Cut down on stress. Calm yourself or release tension by doing an activity you enjoy, such as reading a book, taking a hot bath, or gardening. · Talk to your doctor or pharmacist about nicotine replacement therapy, which replaces the nicotine in your body. You still get nicotine but you do not use tobacco. Nicotine replacement products help you slowly reduce the amount of nicotine you need. These products come in several forms, many of them available over-the-counter:  ? Nicotine patches  ? Nicotine gum and lozenges  ? Nicotine inhaler  · Ask your doctor about bupropion (Wellbutrin) or varenicline (Chantix), which are prescription medicines. They do not contain nicotine.  They help you by reducing withdrawal symptoms, such as stress and anxiety. · Some people find hypnosis, acupuncture, and massage helpful for ending the smoking habit. · Eat a healthy diet and get regular exercise. Having healthy habits will help your body move past its craving for nicotine. · Be prepared to keep trying. Most people are not successful the first few times they try to quit. Do not get mad at yourself if you smoke again. Make a list of things you learned and think about when you want to try again, such as next week, next month, or next year. Where can you learn more? Go to [email or web address]://[email or web address] [email or web address].AudienceView. org and sign in to your Smacktive.com account. Enter Q052 in the [employer] box to learn more about \"Stopping Smoking: Care Instructions. \"     If you do not have an account, please click on the \"Sign Up Now\" link. Current as of: July 4, 2019  Content Version: 12.3  © 3278-2269 Healthwise, Incorporated. Care instructions adapted under license by [facility] ([facility]). If you have questions about a medical condition or this instruction, always ask your healthcare professional. [place] [address] any warranty or liability for your use of this information.

## 2020-03-26 ENCOUNTER — TELEPHONE (OUTPATIENT)
Dept: PHARMACY | Age: 70
End: 2020-03-26

## 2020-03-30 ENCOUNTER — TELEPHONE (OUTPATIENT)
Dept: PHARMACY | Age: 70
End: 2020-03-30

## 2020-04-01 ENCOUNTER — NURSE ONLY (OUTPATIENT)
Dept: LAB | Age: 70
End: 2020-04-01

## 2020-04-01 ENCOUNTER — HOSPITAL ENCOUNTER (OUTPATIENT)
Dept: PHARMACY | Age: 70
Setting detail: THERAPIES SERIES
Discharge: HOME | End: 2020-04-01
Payer: MEDICARE

## 2020-04-01 PROBLEM — R33.9 RETENTION OF URINE: Status: ACTIVE | Noted: 2018-06-21

## 2020-04-01 LAB — INR BLD: 2.33 (ref 0.85–1.13)

## 2020-04-01 PROCEDURE — 99211 OFF/OP EST MAY X REQ PHY/QHP: CPT

## 2020-04-01 NOTE — PROGRESS NOTES
Medication Management [address]  [phone] (phone)  [phone] (fax)      Anticoagulation encounter completed by telephone. Patient had lab result completed at outside lab. Mr. Maryann [place] is a 81 y.o.  male with history of paroxysmal Afib. Spoke with patient's Susan Mccall who answered all questions. Patient verifies current dosing regimen and tablet strength. No missed or extra doses. Follows coumadin dosing calendar. Patient denies s/s bleeding/bruising/swelling/SOB/chest pain. No blood in urine or stool. No dietary changes. No changes in medication/OTC agents/Herbals. No change in alcohol use or tobacco use. No change in activity level. Patient denies headaches/dizziness/lightheadedness/falls. No vomiting/diarrhea or acute illness. No procedures scheduled in the future at this time. Assessment:   Lab Results   Component Value Date    INR 2.33 (H) 04/01/2020    INR 2.60 (H) 03/04/2020    INR 2.10 (H) 02/17/2020     INR therapeutic   Recent Labs     04/01/20  0910   INR 2.33*     Plan: POCT INR ordered and result reviewed. Continue Coumadin 5 mg po MWF, 2.5 mg po TTHSS. Recheck INR in 4 weeks at [address] [place] [address] lab. Joey reminded to call the Anticoagulation Clinic with any signs or symptoms of bleeding or with any medication changes. Joey given instructions utilizing the teach back method.

## 2020-04-29 ENCOUNTER — HOSPITAL ENCOUNTER (OUTPATIENT)
Dept: PHARMACY | Age: 70
Setting detail: THERAPIES SERIES
Discharge: HOME | End: 2020-04-29
Payer: MEDICARE

## 2020-04-29 ENCOUNTER — NURSE ONLY (OUTPATIENT)
Dept: LAB | Age: 70
End: 2020-04-29

## 2020-04-29 LAB — INR BLD: 2.83 (ref 0.85–1.13)

## 2020-06-02 ENCOUNTER — TELEPHONE (OUTPATIENT)
Dept: PHARMACY | Age: 70
End: 2020-06-02

## 2020-06-02 NOTE — TELEPHONE ENCOUNTER
Medication Management [address]   [phone] (phone)  [phone] (fax)      Pharmacy student working under the supervision of a licensed pharmacist during the COVID-19 pandemic assessed the following: The following statement was review with patient regarding their virtual visit:  We want to confirm that, for purposes of billing, this is a virtual visit with your provider for which we will submit a claim for reimbursement with your insurance company. You may be responsible for any copays, coinsurance amounts or other amounts not covered by your insurance company. If you do not accept this, unfortunately we will not be able to schedule a virtual visit with the provider. Do you accept? Yes    Verbal Consent for Consult Agreement participation during COVID-19 Pandemic  Verbiage for CPA Consent:  Discussed with patient the Pharmacist Collaborative Practice Agreement. Patient provided verbal and/or electronic (exFlora Skipper) consent to be managed by a pharmacist per collaborative practice agreement between the pharmacist and referring physician. This is in lieu of paper consent due to COVID-19 precautions and the use of remote/virtual visits. Yes    Current Outpatient Medications   Medication Sig Dispense Refill    tamsulosin (FLOMAX) 0.4 MG capsule Take 1 capsule by mouth 2 times daily Started 2-. 60 capsule 1    warfarin (COUMADIN) 5 MG tablet TAKE AS DIRECTED BY COUMADIN CLINIC 90 tabs = 90 days (Patient taking differently: Take by mouth every evening Indications: Anticoagulant Therapy, Atrial Fibrillation TAKE AS DIRECTED BY COUMADIN CLINIC 90 tabs = 90 days) 90 tablet 3    LINZESS 145 MCG capsule Take by mouth See Admin Instructions Indications: Chronic Constipation   4    aspirin 81 MG EC tablet Take 81 mg by mouth daily Take with food.       sertraline (ZOLOFT) 50 MG tablet Take 50 mg by mouth daily       atorvastatin (LIPITOR) 40 MG tablet Take 1

## 2020-06-03 ENCOUNTER — HOSPITAL ENCOUNTER (OUTPATIENT)
Dept: PHARMACY | Age: 70
Setting detail: THERAPIES SERIES
Discharge: HOME | End: 2020-06-03
Payer: MEDICARE

## 2020-06-03 ENCOUNTER — NURSE ONLY (OUTPATIENT)
Dept: LAB | Age: 70
End: 2020-06-03

## 2020-06-03 LAB — INR BLD: 2.85 (ref 0.85–1.13)

## 2020-06-03 PROCEDURE — 99211 OFF/OP EST MAY X REQ PHY/QHP: CPT

## 2020-06-03 NOTE — PROGRESS NOTES
Medication Management [address] [place] [address]  [phone] (phone)  [phone] (fax)      Anticoagulation encounter completed via telephone. Patient had lab result completed at outside lab. Spoke with patient's Quintin Zendejas. Mr. Viviana Squires is a 81 y.o.  male with history of Afib. Patient's SERA Hernandez verifies current dosing regimen and tablet strength. No missed or extra doses. Patient denies s/s bleeding/bruising/swelling/SOB/chest pain  No blood in urine or stool. No dietary changes. No changes in medication/OTC agents/Herbals. No change in alcohol use or tobacco use. More active recently. Patient denies headaches/dizziness/lightheadedness/falls. No vomiting/diarrhea or acute illness. No Procedures scheduled in the future at this time. Assessment:   Lab Results   Component Value Date    INR 2.85 (H) 06/03/2020    INR 2.83 (H) 04/29/2020    INR 2.33 (H) 04/01/2020     INR therapeutic   Recent Labs     06/03/20  0929   INR 2.85*     Patient has been stable on current regimen since February 2020. Plan:  Continue Coumadin 5 mg MWF and 2.5 mg TuThSaSu. Recheck INR in 6 weeks. Patient's Quintin Zendejas reminded to call the Anticoagulation Clinic with any signs or symptoms of bleeding or with any medication changes. Patient's SERA Hernandez given instructions utilizing the teach back method. Tyrese Lazar, JustinD, BCPS  6/3/2020  11:03 AM    The following statement was review with patient regarding this virtual visit:  We want to confirm that, for purposes of billing, this is a virtual visit with your provider for which we will submit a claim for reimbursement with your insurance company. You may be responsible for any copays, coinsurance amounts or other amounts not covered by your insurance company. If you do not accept this, unfortunately we will not be able to schedule a virtual visit with the provider. Do you accept?   Yes    CLINICAL PHARMACY CONSULT: MED

## 2020-07-09 RX ORDER — TAMSULOSIN HYDROCHLORIDE 0.4 MG/1
CAPSULE ORAL
Qty: 60 CAPSULE | Refills: 0 | Status: SHIPPED | OUTPATIENT
Start: 2020-07-09 | End: 2020-07-14 | Stop reason: SDUPTHER

## 2020-07-14 ENCOUNTER — OFFICE VISIT (OUTPATIENT)
Dept: UROLOGY | Age: 70
End: 2020-07-14
Payer: MEDICARE

## 2020-07-14 ENCOUNTER — NURSE ONLY (OUTPATIENT)
Dept: LAB | Age: 70
End: 2020-07-14

## 2020-07-14 VITALS — WEIGHT: 241 LBS | BODY MASS INDEX: 34.5 KG/M2 | HEIGHT: 70 IN | TEMPERATURE: 97.9 F

## 2020-07-14 LAB
BACTERIA: ABNORMAL
BILIRUBIN URINE: ABNORMAL
BILIRUBIN URINE: NEGATIVE
BLOOD URINE, POC: ABNORMAL
BLOOD, URINE: NEGATIVE
CASTS: ABNORMAL /LPF
CASTS: ABNORMAL /LPF
CHARACTER, URINE: CLEAR
CHARACTER, URINE: CLEAR
COLOR, URINE: YELLOW
COLOR: ABNORMAL
CRYSTALS: ABNORMAL
EPITHELIAL CELLS, UA: ABNORMAL /HPF
GLUCOSE URINE: NEGATIVE MG/DL
GLUCOSE, URINE: NEGATIVE MG/DL
HCT VFR BLD CALC: 44.8 % (ref 42–52)
HEMOGLOBIN: 14.7 GM/DL (ref 14–18)
INR BLD: 1.79 (ref 0.85–1.13)
KETONES, URINE: ABNORMAL
KETONES, URINE: NEGATIVE
LEUKOCYTE CLUMPS, URINE: NEGATIVE
LEUKOCYTE ESTERASE, URINE: NEGATIVE
MISCELLANEOUS LAB TEST RESULT: ABNORMAL
NITRITE, URINE: NEGATIVE
NITRITE, URINE: NEGATIVE
PH UA: 5 (ref 5–9)
PH, URINE: 5.5 (ref 5–9)
POST VOID RESIDUAL (PVR): 0 ML
PROTEIN UA: NEGATIVE MG/DL
PROTEIN, URINE: NEGATIVE MG/DL
RBC URINE: ABNORMAL /HPF
RENAL EPITHELIAL, UA: ABNORMAL
SPECIFIC GRAVITY UA: > 1.03 (ref 1–1.03)
SPECIFIC GRAVITY, URINE: >= 1.03 (ref 1–1.03)
UROBILINOGEN, URINE: 1 EU/DL (ref 0–1)
UROBILINOGEN, URINE: 1 EU/DL (ref 0–1)
WBC UA: ABNORMAL /HPF
YEAST: ABNORMAL

## 2020-07-14 PROCEDURE — 81003 URINALYSIS AUTO W/O SCOPE: CPT | Performed by: NURSE PRACTITIONER

## 2020-07-14 PROCEDURE — 51798 US URINE CAPACITY MEASURE: CPT | Performed by: NURSE PRACTITIONER

## 2020-07-14 PROCEDURE — 99214 OFFICE O/P EST MOD 30 MIN: CPT | Performed by: NURSE PRACTITIONER

## 2020-07-14 RX ORDER — ALBUTEROL SULFATE 90 UG/1
2 AEROSOL, METERED RESPIRATORY (INHALATION) EVERY 6 HOURS PRN
COMMUNITY

## 2020-07-14 RX ORDER — FINASTERIDE 5 MG/1
5 TABLET, FILM COATED ORAL DAILY
Qty: 30 TABLET | Refills: 5 | Status: SHIPPED | OUTPATIENT
Start: 2020-07-14 | End: 2021-01-12

## 2020-07-14 RX ORDER — TAMSULOSIN HYDROCHLORIDE 0.4 MG/1
CAPSULE ORAL
Qty: 180 CAPSULE | Refills: 3 | Status: SHIPPED | OUTPATIENT
Start: 2020-07-14 | End: 2021-07-20

## 2020-07-14 ASSESSMENT — ENCOUNTER SYMPTOMS
BACK PAIN: 0
ABDOMINAL PAIN: 0

## 2020-07-14 NOTE — PATIENT INSTRUCTIONS
take.  How can you care for yourself at home? · Ask your family, friends, and coworkers for support. You have a better chance of quitting if you have help and support. · Join a support group, such as Nicotine Anonymous, for people who are trying to quit smoking. · Consider signing up for a smoking cessation program, such as the American Lung Association's Freedom from Smoking program.  · Get text messaging support. Go to the website at www.smokefree. gov to sign up for the [facility] program.  · Set a quit date. Pick your date carefully so that it is not right in the middle of a big deadline or stressful time. Once you quit, do not even take a puff. Get rid of all ashtrays and lighters after your last cigarette. Clean your house and your clothes so that they do not smell of smoke. · Learn how to be a nonsmoker. Think about ways you can avoid those things that make you reach for a cigarette. ? Avoid situations that put you at greatest risk for smoking. For some people, it is hard to have a drink with friends without smoking. For others, they might skip a coffee break with coworkers who smoke. ? Change your daily routine. Take a different route to work or eat a meal in a different place. · Cut down on stress. Calm yourself or release tension by doing an activity you enjoy, such as reading a book, taking a hot bath, or gardening. · Talk to your doctor or pharmacist about nicotine replacement therapy, which replaces the nicotine in your body. You still get nicotine but you do not use tobacco. Nicotine replacement products help you slowly reduce the amount of nicotine you need. These products come in several forms, many of them available over-the-counter:  ? Nicotine patches  ? Nicotine gum and lozenges  ? Nicotine inhaler  · Ask your doctor about bupropion (Wellbutrin) or varenicline (Chantix), which are prescription medicines. They do not contain nicotine.  They help you by reducing withdrawal symptoms, such as stress and anxiety. · Some people find hypnosis, acupuncture, and massage helpful for ending the smoking habit. · Eat a healthy diet and get regular exercise. Having healthy habits will help your body move past its craving for nicotine. · Be prepared to keep trying. Most people are not successful the first few times they try to quit. Do not get mad at yourself if you smoke again. Make a list of things you learned and think about when you want to try again, such as next week, next month, or next year. Where can you learn more? Go to https://Nexgencemichelle.Covermate Products. org and sign in to your [employer] account. Enter R626 in the [facility] box to learn more about \"Stopping Smoking: Care Instructions. \"     If you do not have an account, please click on the \"Sign Up Now\" link. Current as of: March 12, 2020               Content Version: 12.5  © 4884-5099 [employer]. Care instructions adapted under license by [facility] ([facility]). If you have questions about a medical condition or this instruction, always ask your healthcare professional. [place] [address] any warranty or liability for your use of this information. Patient Education        Stopping Smoking: Care Instructions  Your Care Instructions     Cigarette smokers crave the nicotine in cigarettes. Giving it up is much harder than simply changing a habit. Your body has to stop craving the nicotine. It is hard to quit, but you can do it. There are many tools that people use to quit smoking. You may find that combining tools works best for you. There are several steps to quitting. First you get ready to quit. Then you get support to help you. After that, you learn new skills and behaviors to become a nonsmoker. For many people, a necessary step is getting and using medicine. Your doctor will help you set up the plan that best meets your needs.  You may want to attend a smoking cessation program to help you quit smoking. When you choose a program, look for one that has proven success. Ask your doctor for ideas. You will greatly increase your chances of success if you take medicine as well as get counseling or join a cessation program.  Some of the changes you feel when you first quit tobacco are uncomfortable. Your body will miss the nicotine at first, and you may feel short-tempered and grumpy. You may have trouble sleeping or concentrating. Medicine can help you deal with these symptoms. You may struggle with changing your smoking habits and rituals. The last step is the tricky one: Be prepared for the smoking urge to continue for a time. This is a lot to deal with, but keep at it. You will feel better. Follow-up care is a key part of your treatment and safety. Be sure to make and go to all appointments, and call your doctor if you are having problems. It's also a good idea to know your test results and keep a list of the medicines you take. How can you care for yourself at home? · Ask your family, friends, and coworkers for support. You have a better chance of quitting if you have help and support. · Join a support group, such as Nicotine Anonymous, for people who are trying to quit smoking. · Consider signing up for a smoking cessation program, such as the American Lung Association's Freedom from Smoking program.  · Get text messaging support. Go to the website at www.smokefree. gov to sign up for the [facility] program.  · Set a quit date. Pick your date carefully so that it is not right in the middle of a big deadline or stressful time. Once you quit, do not even take a puff. Get rid of all ashtrays and lighters after your last cigarette. Clean your house and your clothes so that they do not smell of smoke. · Learn how to be a nonsmoker. Think about ways you can avoid those things that make you reach for a cigarette. ? Avoid situations that put you at greatest risk for smoking.  For some people, it is hard to have a drink with friends without smoking. For others, they might skip a coffee break with coworkers who smoke. ? Change your daily routine. Take a different route to work or eat a meal in a different place. · Cut down on stress. Calm yourself or release tension by doing an activity you enjoy, such as reading a book, taking a hot bath, or gardening. · Talk to your doctor or pharmacist about nicotine replacement therapy, which replaces the nicotine in your body. You still get nicotine but you do not use tobacco. Nicotine replacement products help you slowly reduce the amount of nicotine you need. These products come in several forms, many of them available over-the-counter:  ? Nicotine patches  ? Nicotine gum and lozenges  ? Nicotine inhaler  · Ask your doctor about bupropion (Wellbutrin) or varenicline (Chantix), which are prescription medicines. They do not contain nicotine. They help you by reducing withdrawal symptoms, such as stress and anxiety. · Some people find hypnosis, acupuncture, and massage helpful for ending the smoking habit. · Eat a healthy diet and get regular exercise. Having healthy habits will help your body move past its craving for nicotine. · Be prepared to keep trying. Most people are not successful the first few times they try to quit. Do not get mad at yourself if you smoke again. Make a list of things you learned and think about when you want to try again, such as next week, next month, or next year. Where can you learn more? Go to https://ToldocarmelDacuda.Effdon. org and sign in to your [employer] account. Enter Y576 in the [employer] box to learn more about \"Stopping Smoking: Care Instructions. \"     If you do not have an account, please click on the \"Sign Up Now\" link. Current as of: March 12, 2020               Content Version: 12.5  © 1260-4279 Healthwise[employer] Incorporated. Care instructions adapted under license by [facility] ([facility]).  If you have questions about a medical condition or this instruction, always ask your healthcare professional. [place] [address] any warranty or liability for your use of this information.

## 2020-07-14 NOTE — PROGRESS NOTES
[address] [address]  Dept: [phone]  Loc: [phone]    Visit Date: 7/14/2020        HPI:     Hilda Garcia is a 81 y.o. male who presents today for:  Chief Complaint   Patient presents with    Urinary Retention    Follow-up     PVR       HPI   Pt seen in follow up for UTI, BPH with urinary retention, back pain. Abbie Villalobos has a hx of BPH with urinary retention and was started on Tamsulosin in February. He continued to have incomplete emptying and flomax was increased to BID. He reports no further UTIs. Denies dysuria, gross hematuria, fever, chills, abdominal or back pain. Reports he still wakes up every 2 hours at night. Frequency of about 10 x every day. Pt also reports a \"bump\" on his right scrotum that he first noticed 1-2 months ago. No pain, redness, irritation at the site. Current Outpatient Medications   Medication Sig Dispense Refill    albuterol sulfate HFA (VENTOLIN HFA) 108 (90 Base) MCG/ACT inhaler Inhale 2 puffs into the lungs every 6 hours as needed for Wheezing      finasteride (PROSCAR) 5 MG tablet Take 1 tablet by mouth daily 30 tablet 5    tamsulosin (FLOMAX) 0.4 MG capsule TAKE 1 CAPSULE BY MOUTH TWICE A  capsule 3    warfarin (COUMADIN) 5 MG tablet TAKE AS DIRECTED BY COUMADIN CLINIC 90 tabs = 90 days (Patient taking differently: Take by mouth every evening Indications: Anticoagulant Therapy, Atrial Fibrillation TAKE AS DIRECTED BY COUMADIN CLINIC 90 tabs = 90 days) 90 tablet 3    LINZESS 145 MCG capsule Take by mouth See Admin Instructions Indications: Chronic Constipation   4    sertraline (ZOLOFT) 50 MG tablet Take 50 mg by mouth daily       atorvastatin (LIPITOR) 40 MG tablet Take 1 tablet by mouth nightly.  (Patient taking differently: Take 40 mg by mouth nightly Indications: Increase in the Amount of Cholesterol in the Blood ) 30 tablet 0    metoprolol (LOPRESSOR) 50 MG tablet Take 1 tablet by mouth 2 times daily. (Patient taking differently: Take 50 mg by mouth 2 times daily Indications: Atrial Fibrillation ) 60 tablet 0    diltiazem (CARDIZEM CD) 180 MG ER capsule Take 1 capsule by mouth daily. (Patient taking differently: Take 180 mg by mouth daily Indications: Atrial Fibrillation ) 30 capsule 0    aspirin 81 MG EC tablet Take 81 mg by mouth daily Take with food. No current facility-administered medications for this visit. Past Medical History  Cyndi Tanner  has a past medical history of Atrial fibrillation with rapid ventricular response ([address] Hypertension, Irregular heart beat, Stroke ([address] and Unspecified cerebral artery occlusion with cerebral infarction. Past Surgical History  The patient  has a past surgical history that includes transthoracic echocardiogram (08/23/2011); cardiovascular stress test (08/22/2011); Hydrocele surgery (Left, 12/6/13); Testicle surgery (2015); Hydrocele surgery (04/27/2017); lipoma resection (04/27/2017); Colonoscopy; and Colonoscopy (Left, 7/17/2018). Family History  This patient's family history includes Cancer in his father; Diabetes in his father and mother; Heart Disease in his mother. Social History  Cyndi Tanner  reports that he has been smoking cigarettes. He has a 45.00 pack-year smoking history. He has never used smokeless tobacco. He reports previous alcohol use. He reports that he does not use drugs. Subjective:      Review of Systems   Constitutional: Negative for activity change, appetite change, chills, diaphoresis, fatigue, fever and unexpected weight change. Gastrointestinal: Negative for abdominal pain. Genitourinary: Negative for decreased urine volume, difficulty urinating, dysuria, flank pain, frequency, hematuria and urgency. Musculoskeletal: Negative for back pain.        Objective:   Temp 97.9 °F (36.6 °C) (Temporal)   Ht 5' 10\" (1.778 m)   Wt 241 lb (109.3 kg)   BMI 34.58 kg/m²     Physical Exam  Constitutional:       General: He is not in acute distress. Appearance: Normal appearance. He is not ill-appearing or diaphoretic. Comments: Accompanied by brother in law   HENT:      Head: Normocephalic and atraumatic. Right Ear: External ear normal.      Left Ear: External ear normal.      Nose: Nose normal.      Mouth/Throat:      Mouth: Mucous membranes are moist.   Eyes:      General:         Right eye: No discharge. Left eye: No discharge. Pulmonary:      Effort: No respiratory distress. Breath sounds: Normal breath sounds. Genitourinary:     Comments: Small less than 1 cm lump to R scrotum consistent with a sebaceous cyst.  No redness   Neurological:      Mental Status: He is alert and oriented to person, place, and time. Mental status is at baseline. Psychiatric:         Mood and Affect: Mood normal.         Behavior: Behavior normal.         Thought Content:  Thought content normal.         POC  Results for POC orders placed in visit on 07/14/20   POCT Urinalysis No Micro (Auto)   Result Value Ref Range    Glucose, Ur Negative NEGATIVE mg/dl    Bilirubin Urine Small (A)     Ketones, Urine Negative NEGATIVE    Specific Gravity, Urine >= 1.030 1.002 - 1.03    Blood, UA POC Trace-intact NEGATIVE    pH, Urine 5.50 5.0 - 9.0    Protein, Urine Negative NEGATIVE mg/dl    Urobilinogen, Urine 1.00 0.0 - 1.0 eu/dl    Nitrite, Urine Negative NEGATIVE    Leukocyte Clumps, Urine Negative NEGATIVE    Color, Urine Yellow YELLOW-STR    Character, Urine Clear [id number]   poct post void residual   Result Value Ref Range    post void residual 0 ml         Patients recent PSA values are as follows  Lab Results   Component Value Date    PSA 1.34 (H) 03/04/2020        Recent BUN/Creatinine:  Lab Results   Component Value Date    BUN 15 11/18/2019    CREATININE 0.8 11/18/2019         Assessment:   BPH with LUTs and urinary retention  Scrotal sebaceous cyst  Micro hematuria  Current tobacco user  Hx R hydrocele s/p hydrocelectomy    Plan:     Urine dip today with trace blood. Send for microscopy. Continue tamsulosin 0.4 mg PO BID. Add finasteride 5 mg daily for continued nocturia and LUTs. Pt aware it may take up to 6 months to see full benefits from the medication. Pt has small area on scrotum consistent with a possible sebaceous cyst.  Monitor at this time. Pt aware to call the office for any pain, redness, or irritation. F/u in 6 months with PVR.

## 2020-07-15 ENCOUNTER — TELEPHONE (OUTPATIENT)
Dept: PHARMACY | Age: 70
End: 2020-07-15

## 2020-07-15 ENCOUNTER — HOSPITAL ENCOUNTER (OUTPATIENT)
Dept: PHARMACY | Age: 70
Setting detail: THERAPIES SERIES
Discharge: HOME | End: 2020-07-15
Payer: MEDICARE

## 2020-07-15 PROCEDURE — 99211 OFF/OP EST MAY X REQ PHY/QHP: CPT

## 2020-07-15 NOTE — PROGRESS NOTES
Medication Management [address]  [phone] (phone)  [phone] (fax)      Anticoagulation encounter completed via telephone. Patient had lab result completed at outside lab. Spoke with [address] [place] [address]. Mr. Priya Quinonez is a 81 y.o.  male with history of Afib. Patient's POA Patricia verifies current dosing regimen and tablet strength. No missed or extra doses. Patient denies s/s bleeding/bruising/swelling/chest pain. Patient had SOB one day, but was prescribed albuterol PRN. No blood in urine or stool. No dietary changes. No changes in medication/OTC agents/Herbals. No change in alcohol use or tobacco use. Patient has been a little more active with outside work. Patient denies headaches/dizziness/lightheadedness/falls. No vomiting/diarrhea or acute illness. No Procedures scheduled in the future at this time. Assessment:   Lab Results   Component Value Date    INR 1.79 (H) 07/14/2020    INR 2.85 (H) 06/03/2020    INR 2.83 (H) 04/29/2020     INR subtherapeutic   Recent Labs     07/14/20  1512   INR 1.79*     Patient interview completed and discussed with pharmacist by Karol Arambula, [employer] Candidate. Hgb 14.7 and Hct 44.8 slightly decreased, but relatively stable when compared to previous values. Patient had been stable on current regimen since February 2020 before the subtherapeutic result today. Plan:  Coumadin 7.5 mg x 1 dose today 7/15/20 then continue Coumadin 5 mg MWF and 2.5 mg TuThSaSu. Recheck INR in 3 weeks. Patient's [address] [place] [address] reminded to call the Anticoagulation Clinic with any signs or symptoms of bleeding or with any medication changes. Patient's POSHANNA Gomez given instructions utilizing the teach back method.     Miah Link PharmD, BCPS  7/15/2020  1:27 PM    The following statement was review with patient regarding this virtual visit:  We want to confirm that, for purposes of billing, this is a virtual visit with your provider for which we will submit a claim for reimbursement with your insurance company. You may be responsible for any copays, coinsurance amounts or other amounts not covered by your insurance company. If you do not accept this, unfortunately we will not be able to schedule a virtual visit with the provider. Do you accept?   Yes    CLINICAL PHARMACY CONSULT: MED RECONCILIATION/REVIEW ADDENDUM    For Pharmacy Admin Tracking Only    PHSO: No  Total # of Interventions Recommended: 1  - Increased Dose #: 1  - Maintenance Safety Lab Monitoring #: 1  Total Interventions Accepted: 1  Time Spent (min): [address], PharmD

## 2020-07-15 NOTE — TELEPHONE ENCOUNTER
Noted. No change in plan from earlier note. Proscar does not interact.     Deepa Ram PharmD, BCPS  7/15/2020  2:56 PM

## 2020-07-20 ENCOUNTER — TELEPHONE (OUTPATIENT)
Dept: UROLOGY | Age: 70
End: 2020-07-20

## 2020-07-20 NOTE — TELEPHONE ENCOUNTER
Attempted to call pt to discuss microscopic hematuria on urinalysis. Pt's representative not home. Person that answered the phone said she would be available tomorrow afternoon. Will attempt to call back tomorrrow.

## 2020-07-22 NOTE — TELEPHONE ENCOUNTER
Called and spoke with pt's representative regarding finding of microscopic hematuria on urinalysis. Discussed recommendation for baseline hematuria evaluation given patient's age and current tobacco usage. She reports pt is agreeable. We will schedule Sony Martinez for CT urogram with appt with one of the physicians to review results and perform office cystoscopy.

## 2020-08-03 NOTE — TELEPHONE ENCOUNTER
Patient is scheduled for CT Urogram on 08- at 3:20pm with an arrival of 2:50am at [address] [place] [address]. Appointment scheduled for patient to see Dr. Fabian Patiño for Cysto.

## 2020-08-05 ENCOUNTER — TELEPHONE (OUTPATIENT)
Dept: PHARMACY | Age: 70
End: 2020-08-05

## 2020-08-05 ENCOUNTER — HOSPITAL ENCOUNTER (OUTPATIENT)
Age: 70
Discharge: HOME | End: 2020-08-05
Payer: MEDICARE

## 2020-08-05 ENCOUNTER — HOSPITAL ENCOUNTER (OUTPATIENT)
Dept: CT IMAGING | Age: 70
Discharge: HOME | End: 2020-08-05
Payer: MEDICARE

## 2020-08-05 LAB
INR BLD: 2.3 (ref 0.85–1.13)
POC CREATININE WHOLE BLOOD: 0.9 MG/DL (ref 0.5–1.2)

## 2020-08-05 PROCEDURE — 6360000004 HC RX CONTRAST MEDICATION: Performed by: NURSE PRACTITIONER

## 2020-08-05 PROCEDURE — 82565 ASSAY OF CREATININE: CPT

## 2020-08-05 PROCEDURE — 74178 CT ABD&PLV WO CNTR FLWD CNTR: CPT

## 2020-08-05 PROCEDURE — 36415 COLL VENOUS BLD VENIPUNCTURE: CPT

## 2020-08-05 PROCEDURE — 85610 PROTHROMBIN TIME: CPT

## 2020-08-05 RX ADMIN — IOPAMIDOL 85 ML: 755 INJECTION, SOLUTION INTRAVENOUS at 15:54

## 2020-08-05 NOTE — TELEPHONE ENCOUNTER
Friend that brings patient dropped by office (waiting on patient while CT done). States he'll have bloodwork (other labs too?) done after that, but wants to come back to clinic so vein doesn't have to be stuck. Advised him pharmacist will call POA tomorrow to do phone visit, then can make next appt. for clinic. Understanding/agreement voiced.

## 2020-08-06 ENCOUNTER — HOSPITAL ENCOUNTER (OUTPATIENT)
Dept: PHARMACY | Age: 70
Setting detail: THERAPIES SERIES
Discharge: HOME | End: 2020-08-06
Payer: MEDICARE

## 2020-08-06 ENCOUNTER — TELEPHONE (OUTPATIENT)
Dept: PHARMACY | Age: 70
End: 2020-08-06

## 2020-08-06 PROCEDURE — 99211 OFF/OP EST MAY X REQ PHY/QHP: CPT

## 2020-08-06 NOTE — TELEPHONE ENCOUNTER
Called POA's number - mother answered. POA not available; should try back in a hour.   (Need to discuss INR.)

## 2020-08-06 NOTE — PROGRESS NOTES
Medication Management [address]   [phone] (phone)  [phone] (fax)      Anticoagulation encounter completed via telephone. Patient had lab result completed at outside lab. Mr. Jignesh Issa is a 81 y.o.  male with history of paroxysmal atrial fib. , per Dr. Mirna Tamayo referral (3 week visit). Dc Bowens, verifies current dosing regimen and tablet strength. No missed or extra doses, except as ordered last visit. Denies bleeding/bruising/swelling/SOB/chest pain. No blood in stool. Cystogram 8/11 for microscopic hematuria (no Coumadin hold, POA states.)  No dietary changes. No changes in medication/OTC agents/herbals. No change in alcohol use or tobacco use. No change in activity level. Denies headaches/dizziness/lightheadedness/falls. No vomiting/diarrhea or acute illness. No procedures scheduled in the future at this time, except as noted above. Assessment:   Lab Results   Component Value Date    INR 2.30 (H) 08/05/2020    INR 1.79 (H) 07/14/2020    INR 2.85 (H) 06/03/2020     INR therapeutic - goal 2-3. Recent Labs     08/05/20  1607   INR 2.30*     Plan:  Continue PO Coumadin 5 mg MWF, 2.5 mg TThSS. Recheck INR in 4 week(s) in clinic. POA reminded to call the Anticoagulation Clinic with any signs or symptoms of bleeding or with any medication changes. POA given instructions utilizing the teach back method. AVS given to RMA to mail tomorrow. The following statement was review with POA regarding this virtual visit:  We want to confirm that, for purposes of billing, this is a virtual visit with your provider for which we will submit a claim for reimbursement with your insurance company. You may be responsible for any copays, coinsurance amounts or other amounts not covered by your insurance company. If you do not accept this, unfortunately we will not be able to schedule a virtual visit with the provider. Do you accept?   Yes.

## 2020-08-10 NOTE — PROGRESS NOTES
Milton Ascencio MD        [address] [place] [id number]  Dept: [phone]  Dept Fax: [phone]: [address] [address] Urology Office Note -     Patient:  Yoko Tucker  YOB: 1950    The patient is a 81 y.o. male who presents today for evaluation of the following problems: BPH with luts  Chief Complaint   Patient presents with    Follow-up    Hematuria        HISTORY OF PRESENT ILLNESS:     BPH with luts  Onset was  Years ago  Overall, the problem(s) are unchanged. Severity is described as mild-moderate. Associated Symptoms: No dysuria, no gross hematuria. Current Pain Severity: 0     Currently on flomax. He reports his urinary issues are improving. OAB -since stroke. Emptying well. Prostate --moderately enlarged. Secondary Diagnosis:     bilateral non obstructing kidney stones. Found on CT urogram    Kidney lesion vs hemorggahic cyst :   Found on CT urogram- recommend renal Ultrasound for further testing . Right scrotal lump: sebaceous cyst,not painful. Microscopic hematuria-  Normal cystoscopy       CT urogram from 8/5/20 reviewed. This demonstrates bilateral nonobstructing kidney stones, hyperdense lesion at the superior pole of the right kidney vs hemorraghic cyst.           Summary of Previous Records:     Pt seen in follow up for UTI, BPH with urinary retention, back pain.     Puja Escobar has a hx of BPH with urinary retention and was started on Tamsulosin in February. He continued to have incomplete emptying and flomax was increased to BID. He reports no further UTIs. Denies dysuria, gross hematuria, fever, chills, abdominal or back pain.       Reports he still wakes up every 2 hours at night. Frequency of about 10 x every day. Pt also reports a \"bump\" on his right scrotum that he first noticed 1-2 months ago. No pain, redness, irritation at the site. Requested/reviewed records from [facility], APRN - NP office and/or outside [de-identified]    (Patient's old records have been requested, reviewed and pertinent findings summarized in today's note.)    Procedures Today:     Cystoscopy Operative Note  Surgeon: [facility]   Anesthesia: Urethral 2%  Indications: hematuria  Position: supine  Findings: diverticulum on dome,   The patient was prepped and draped in the usual sterile fashion. The flexible cystoscope was advanced through the urethra and into the bladder. The bladder was thoroughly inspected and the following was noted:    Residual Urine: Minimal  Urethra: No abnormalities of the urethra are noted. Prostate: moderate lateral lobe hypertrophy  Bladder: No tumors or CIS noted. + bladder diverticulum on dome. moderate trabeculation noted. Ureters: Clear efflux from both ureters. Orifices with normal configuration and location. The cystoscope was removed. The patient tolerated the procedure well.       Last several PSA's:  Lab Results   Component Value Date    PSA 1.34 (H) 03/04/2020       Last total testosterone:  No results found for: TESTOSTERONE    Urinalysis today:  Results for POC orders placed in visit on 08/11/20   POCT Urinalysis No Micro (Auto)   Result Value Ref Range    Glucose, Ur Negative NEGATIVE mg/dl    Bilirubin Urine Small (A)     Ketones, Urine Negative NEGATIVE    Specific Gravity, Urine 1.025 1.002 - 1.03    Blood, UA POC Trace-intact NEGATIVE    pH, Urine 6.00 5.0 - 9.0    Protein, Urine 30 (A) NEGATIVE mg/dl    Urobilinogen, Urine 2.00 0.0 - 1.0 eu/dl    Nitrite, Urine Negative NEGATIVE    Leukocyte Clumps, Urine Negative NEGATIVE    Color, Urine Yellow YELLOW-STR    Character, Urine Clear [id number]       Last BUN and creatinine:  Lab Results   Component Value Date    BUN 15 11/18/2019     Lab Results   Component Value Date    CREATININE 0.8 11/18/2019       Imaging Reviewed during this Office Visit:   Zena Flores MD independently reviewed the images and verified the radiology reports from:    8/5/20- CT UROGRAM  Impression         1. Bilateral nonobstructive nephrolithiasis. 2. There is a hyperdense lesion at the superior pole the right kidney measuring approximately 1.1 cm. It is only well appreciated on the noncontrast exam. This could be a hemorrhagic cyst. Consider further evaluation with renal ultrasound for further    characterization. 3. Some low-density lesions are seen in the liver.  These may represent cysts.                     PAST MEDICAL, FAMILY AND SOCIAL HISTORY:  Past Medical History:   Diagnosis Date    Atrial fibrillation with rapid ventricular response ([address]     Hypertension     Irregular heart beat     [facility]    Unspecified cerebral artery occlusion with cerebral infarction      Past Surgical History:   Procedure Laterality Date    CARDIOVASCULAR STRESS TEST  08/22/2011    EF 43% SUGGESTS ECHO    COLONOSCOPY      COLONOSCOPY Left 7/17/2018    COLONOSCOPY WITH BIOPSY performed by Jess Clancy MD at [address] [place], [place] [address]y 20 Left 12/6/13    Left Scrotal Exploration    HYDROCELE EXCISION  04/27/2017    LIPOMA RESECTION  04/27/2017    right shoulder lipoma resection    TESTICLE SURGERY  2015    TRANSTHORACIC ECHOCARDIOGRAM  08/23/2011    EF 50-55% A FIB C RVR,CONTROLLED HTN/STUDY WAS WITHIN NORMAL LIMITS     Family History   Problem Relation Age of Onset    Heart Disease Mother     Diabetes Mother     Cancer Father     Diabetes Father      Outpatient Medications Marked as Taking for the 8/11/20 encounter (Procedure visit) with Amarjit Vallejo MD   Medication Sig Dispense Refill    albuterol sulfate HFA (VENTOLIN HFA) 108 (90 Base) MCG/ACT inhaler Inhale 2 puffs into the lungs every 6 hours as needed for Wheezing      finasteride (PROSCAR) 5 MG tablet Take 1 tablet by mouth daily 30 tablet 5    tamsulosin (FLOMAX) 0.4 MG capsule TAKE 1 CAPSULE BY MOUTH TWICE A DAY 180 capsule 3    warfarin (COUMADIN) 5 MG tablet TAKE AS DIRECTED BY COUMADIN CLINIC 90 tabs = 90 days (Patient taking differently: Take by mouth every evening Indications: Anticoagulant Therapy, Atrial Fibrillation TAKE AS DIRECTED BY COUMADIN CLINIC 90 tabs = 90 days) 90 tablet 3    LINZESS 145 MCG capsule Take by mouth See Admin Instructions Indications: Chronic Constipation   4    aspirin 81 MG EC tablet Take 81 mg by mouth daily Take with food.  sertraline (ZOLOFT) 50 MG tablet Take 50 mg by mouth daily       atorvastatin (LIPITOR) 40 MG tablet Take 1 tablet by mouth nightly. (Patient taking differently: Take 40 mg by mouth nightly Indications: Increase in the Amount of Cholesterol in the Blood ) 30 tablet 0    metoprolol (LOPRESSOR) 50 MG tablet Take 1 tablet by mouth 2 times daily. (Patient taking differently: Take 50 mg by mouth 2 times daily Indications: Atrial Fibrillation ) 60 tablet 0    diltiazem (CARDIZEM CD) 180 MG ER capsule Take 1 capsule by mouth daily. (Patient taking differently: Take 180 mg by mouth daily Indications: Atrial Fibrillation ) 30 capsule 0       Tramadol  Social History     Tobacco Use   Smoking Status Current Every Day Smoker    Packs/day: 1.00    Years: 45.00    Pack years: 45.00    Types: Cigarettes   Smokeless Tobacco Never Used      (If patient a smoker, smoking cessation counseling offered)   Social History     Substance and Sexual Activity   Alcohol Use Not Currently    Comment: not in over a year       REVIEW OF SYSTEMS:  Constitutional: negative  Eyes: negative  Respiratory: negative  Cardiovascular: negative  Gastrointestinal: negative  Genitourinary: see HPI  Musculoskeletal: negative  Skin: negative   Neurological: negative  Hematological/Lymphatic: negative  Psychological: negative        Physical Exam:    This a 81 y.o. male  Vitals:    08/11/20 1302   Temp: 97 °F (36.1 °C)     Body mass index is 34.72 kg/m².   Constitutional: Patient in no acute distress;         Assessment and Plan        1. Microscopic hematuria    2. Renal cyst               Plan:        BPH-Continue flomax. Pt completely emptying bladder- likely with detrusor overactivity from CVA. If urinary symptoms worsen- recommend starting ditropan. Hematuria workup  Will need occasional monitoring of renal calculi   Renal cyst- renal ultrasound in a year. Return in a year with Ray Garcia. Prescriptions Ordered:  No orders of the defined types were placed in this encounter.      Orders Placed:  Orders Placed This Encounter   Procedures    US RETROPERITONEAL COMPLETE     Standing Status:   Future     Standing Expiration Date:   11/11/2021    POCT Urinalysis No Micro (Auto)    AL CYSTOURETHROSCOPY            RAFITA Albright MD

## 2020-08-11 ENCOUNTER — PROCEDURE VISIT (OUTPATIENT)
Dept: UROLOGY | Age: 70
End: 2020-08-11
Payer: MEDICARE

## 2020-08-11 ENCOUNTER — TELEPHONE (OUTPATIENT)
Dept: UROLOGY | Age: 70
End: 2020-08-11

## 2020-08-11 VITALS — TEMPERATURE: 97 F | WEIGHT: 242 LBS | HEIGHT: 70 IN | BODY MASS INDEX: 34.65 KG/M2

## 2020-08-11 LAB
BILIRUBIN URINE: ABNORMAL
BLOOD URINE, POC: ABNORMAL
CHARACTER, URINE: CLEAR
COLOR, URINE: YELLOW
GLUCOSE URINE: NEGATIVE MG/DL
KETONES, URINE: NEGATIVE
LEUKOCYTE CLUMPS, URINE: NEGATIVE
NITRITE, URINE: NEGATIVE
PH, URINE: 6 (ref 5–9)
PROTEIN, URINE: 30 MG/DL
SPECIFIC GRAVITY, URINE: 1.02 (ref 1–1.03)
UROBILINOGEN, URINE: 2 EU/DL (ref 0–1)

## 2020-08-11 PROCEDURE — 99214 OFFICE O/P EST MOD 30 MIN: CPT | Performed by: UROLOGY

## 2020-08-11 PROCEDURE — 81003 URINALYSIS AUTO W/O SCOPE: CPT | Performed by: UROLOGY

## 2020-08-11 PROCEDURE — 52000 CYSTOURETHROSCOPY: CPT | Performed by: UROLOGY

## 2020-08-11 NOTE — TELEPHONE ENCOUNTER
Patient scheduled for an ultrasound retroperitoneal on 08- at 11:00am with an arrival of 10:30am at [address] [place] [address].

## 2020-09-01 RX ORDER — WARFARIN SODIUM 5 MG/1
TABLET ORAL
Qty: 90 TABLET | Refills: 0 | Status: SHIPPED | OUTPATIENT
Start: 2020-09-01 | End: 2020-12-01

## 2020-09-03 ENCOUNTER — HOSPITAL ENCOUNTER (OUTPATIENT)
Dept: PHARMACY | Age: 70
Setting detail: THERAPIES SERIES
Discharge: HOME | End: 2020-09-03
Payer: MEDICARE

## 2020-09-03 VITALS — TEMPERATURE: 97.1 F

## 2020-09-03 LAB — POC INR: 2.7 (ref 0.8–1.2)

## 2020-09-03 PROCEDURE — 85610 PROTHROMBIN TIME: CPT

## 2020-09-03 PROCEDURE — 36416 COLLJ CAPILLARY BLOOD SPEC: CPT

## 2020-09-03 PROCEDURE — 99211 OFF/OP EST MAY X REQ PHY/QHP: CPT

## 2020-09-03 RX ORDER — NICOTINE 21 MG/24HR
1 PATCH, TRANSDERMAL 24 HOURS TRANSDERMAL EVERY 24 HOURS
COMMUNITY
Start: 2020-09-01 | End: 2020-10-15

## 2020-09-03 RX ORDER — LIDOCAINE 50 MG/G
1 PATCH TOPICAL DAILY
COMMUNITY
End: 2020-10-15

## 2020-09-16 ENCOUNTER — TELEPHONE (OUTPATIENT)
Dept: UROLOGY | Age: 70
End: 2020-09-16

## 2020-09-16 NOTE — TELEPHONE ENCOUNTER
Royce Ibarra stated the patient has more blood in the urine. He thinks it increased since he started the proscar. The urine is cherry red. He denies fever, chills, pain, clots, burning, urgency, or frequency. He is on baby aspirin and coumadin for afib. He seen the [facility] on 09/03/2020. INR was 2.70. He is not drinking much water. He will increase his fluid intake. Please advise. Thank you.

## 2020-09-17 RX ORDER — CEFDINIR 300 MG/1
300 CAPSULE ORAL 2 TIMES DAILY
Qty: 20 CAPSULE | Refills: 0 | Status: SHIPPED | OUTPATIENT
Start: 2020-09-17 | End: 2020-09-27

## 2020-09-17 NOTE — TELEPHONE ENCOUNTER
Evon Lam was advised to have the urine sent to the lab and to start FRANKIE BELLAMY after giving the urine specimen. Order faxed to [facility]. She voiced understanding and also will have him increase his fluid intake. Please schedule renal US. Thank you.

## 2020-09-17 NOTE — TELEPHONE ENCOUNTER
Patient scheduled for US at [facility] MR on 9/23/20 arrival of 4:30 pm with a 5:00 pm scan time. No carbonated beverages the day of and arrive with a full bladder of water.  Patient informed and voiced understanding

## 2020-09-17 NOTE — TELEPHONE ENCOUNTER
Check urinalysis and culture now. Start omnicef empirically for possible infection. Increase water intake. Renal US now.

## 2020-09-18 LAB
APPEARANCE: CLEAR
BILIRUBIN: NEGATIVE
COLOR: ABNORMAL
GLUCOSE BLD-MCNC: NEGATIVE MG/DL
KETONES, URINE: NEGATIVE MG/DL
LEUKOCYTE ESTERASE, URINE: ABNORMAL
NITRITE, URINE: NEGATIVE
OCCULT BLOOD,URINE: ABNORMAL
OTHER MICROSCOPIC ELEMENTS: ABNORMAL
PH: 6.5 (ref 5–8.5)
PROTEIN, URINE: ABNORMAL MG/DL
RBC: 429 /HPF (ref 0–5)
REPORT STATUS: NORMAL
SITE/TYPE: NORMAL
SP GRAVITY MISCELLANEOUS: 1.02 (ref 1–1.03)
URINE CULTURE, ROUTINE: NORMAL
UROBILINOGEN, URINE: <1.1 EU/DL
WBC: 4 /HPF (ref 0–5)

## 2020-09-21 NOTE — TELEPHONE ENCOUNTER
Please let the patient know his urine culture was consistent with infection sensitive to the antibiotic that was prescribed. He should continue the antibiotic to completion. Has his hematuria improved?

## 2020-09-22 NOTE — TELEPHONE ENCOUNTER
Brooklynn Benson stated the hematuria was the same yesterday and has not changed. She has not seen him yet today. She voiced understanding to have the patient take the antibiotics until completed. Thank you.

## 2020-09-23 ENCOUNTER — HOSPITAL ENCOUNTER (OUTPATIENT)
Dept: ULTRASOUND IMAGING | Age: 70
Discharge: HOME | End: 2020-09-23
Payer: MEDICARE

## 2020-09-23 PROCEDURE — 76770 US EXAM ABDO BACK WALL COMP: CPT

## 2020-09-30 ENCOUNTER — HOSPITAL ENCOUNTER (OUTPATIENT)
Dept: PHARMACY | Age: 70
Setting detail: THERAPIES SERIES
Discharge: HOME | End: 2020-09-30
Payer: MEDICARE

## 2020-09-30 VITALS — TEMPERATURE: 96.8 F

## 2020-09-30 LAB — POC INR: 2.1 (ref 0.8–1.2)

## 2020-09-30 PROCEDURE — 36416 COLLJ CAPILLARY BLOOD SPEC: CPT

## 2020-09-30 PROCEDURE — 85610 PROTHROMBIN TIME: CPT

## 2020-09-30 PROCEDURE — 99211 OFF/OP EST MAY X REQ PHY/QHP: CPT

## 2020-09-30 NOTE — PROGRESS NOTES
[facility]ication Management [address] [place] [address]  [phone] (phone)  [phone] (fax)      Mr. Jose Luis Pichardo is a 78 y.o.  male with history of paroxysmal atrial fib. , per Dr. Joyce Guzman referral, who presents today for Warfarin monitoring and adjustment (4 week visit). Patient verifies current dosing regimen and tablet strength. No missed or extra doses. Patient denies bruising/swelling/chest pain. Has usual slight SOB. No blood in stool. Had blood in urine 3-4 days ago - followed by urologist.  Recently took Cefdinir. No dietary changes. No changes in medication/OTC agents/herbals. No change in alcohol use or tobacco use. No change in activity level. Patient denies headaches/falls. Has usual intermittent dizziness. No vomiting/diarrhea or acute illness. No procedures scheduled in the future at this time. Assessment:   Lab Results   Component Value Date    INR 2.10 (H) 09/30/2020    INR 2.70 (H) 09/03/2020    INR 2.30 (H) 08/05/2020     INR therapeutic - goal 2-3. Recent Labs     09/30/20  1120   INR 2.10*     Plan:  POCT INR ordered/performed/result reviewed. Continue PO Coumadin 5 mg MWF, 2.5 mg TThSS. Recheck INR in 4 week(s). Patient reminded to call the Anticoagulation Clinic with any signs or symptoms of bleeding or with any medication changes. Patient given instructions utilizing the teach back method. Discharged ambulatory in no apparent distress, wearing mask, with male significant other. After visit summary printed and reviewed with patient/male significant other.       Medications reviewed and updated on home medication list.    Influenza vaccine:     [] given    [] declined   [] received previously   [] plans to receive at a later time   [] refused    [] documented in Epic

## 2020-09-30 NOTE — TELEPHONE ENCOUNTER
Pt's renal US noted the area in the right kidney consistent with possible hemorrhagic cyst is stable. How is Modesto's hematuria? Has it cleared? We can bring him into the office to further evaluate and check a pvr.

## 2020-10-06 ENCOUNTER — OFFICE VISIT (OUTPATIENT)
Dept: UROLOGY | Age: 70
End: 2020-10-06
Payer: MEDICARE

## 2020-10-06 VITALS — TEMPERATURE: 97.2 F | WEIGHT: 240 LBS | HEIGHT: 70 IN | BODY MASS INDEX: 34.36 KG/M2

## 2020-10-06 LAB
BILIRUBIN URINE: NEGATIVE
BLOOD URINE, POC: NEGATIVE
CHARACTER, URINE: CLEAR
COLOR, URINE: YELLOW
GLUCOSE URINE: NEGATIVE MG/DL
KETONES, URINE: NEGATIVE
LEUKOCYTE CLUMPS, URINE: NEGATIVE
NITRITE, URINE: NEGATIVE
PH, URINE: 6 (ref 5–9)
POST VOID RESIDUAL (PVR): 0 ML
PROTEIN, URINE: NEGATIVE MG/DL
SPECIFIC GRAVITY, URINE: >= 1.03 (ref 1–1.03)
UROBILINOGEN, URINE: 1 EU/DL (ref 0–1)

## 2020-10-06 PROCEDURE — 51798 US URINE CAPACITY MEASURE: CPT | Performed by: NURSE PRACTITIONER

## 2020-10-06 PROCEDURE — 99213 OFFICE O/P EST LOW 20 MIN: CPT | Performed by: NURSE PRACTITIONER

## 2020-10-06 RX ORDER — CEFDINIR 300 MG/1
300 CAPSULE ORAL 2 TIMES DAILY
COMMUNITY
End: 2020-10-28 | Stop reason: ALTCHOICE

## 2020-10-06 ASSESSMENT — ENCOUNTER SYMPTOMS
BACK PAIN: 0
ABDOMINAL PAIN: 0

## 2020-10-06 NOTE — PROGRESS NOTES
[address] [facility],[place] [id number]  Dept: [phone]  Loc: [phone]    Visit Date: 10/6/2020        HPI:     Wale Delgadillo is a 78 y.o. male who presents today for:  Chief Complaint   Patient presents with    Hematuria     findings of renal cyst     Benign Prostatic Hypertrophy    Follow-up       HPI   Pt seen in follow up for gross hematuria. Pt has a hx of BPH with urinary retention that improved on Flomax BID and finasteride 5 mg daily. Cystoscopy was performed 8/11/2020 by Dr. Laya Mcarthur with findings of moderate lateral lobe hypertrophy of the prostate and bladder diverticulum on the dome. CT urogram performed for micro hematuria noted bilateral nonobstructive nephrolithiasis and a hyperdense lesion at the superior pole of the right kidney measuring approximately 1.1 cms--? Possible hemorrhagic cyst.  Repeat US recommended in one year. Following office cystoscopy pt developed an episode of gross hematuria and was noted to have urine culture significant for infection. He was treated with a course of antibiotics and renal US was repeated noting stability of renal lesion. Pt is here today in follow-up. [employer] reports urination back to baseline and gross hematuria resolved. No further issues. Current Outpatient Medications   Medication Sig Dispense Refill    cefdinir (OMNICEF) 300 MG capsule Take 300 mg by mouth 2 times daily      nicotine (NICODERM CQ) 21 MG/24HR Place 1 patch onto the skin every 24 hours       lidocaine (LIDODERM) 5 % Place 1 patch onto the skin daily 12 hours on, 12 hours off.       warfarin (COUMADIN) 5 MG tablet TAKE AS DIRECTED BY COUMADIN CLINIC 90 TABLETS= 90 DAYS 90 tablet 0    albuterol sulfate HFA (VENTOLIN HFA) 108 (90 Base) MCG/ACT inhaler Inhale 2 puffs into the lungs every 6 hours as needed for Wheezing      finasteride (PROSCAR) 5 MG tablet Take 1 tablet by mouth daily 30 tablet 5    tamsulosin (FLOMAX) 0.4 MG capsule TAKE 1 CAPSULE BY MOUTH TWICE A  capsule 3    LINZESS 145 MCG capsule Take by mouth See Admin Instructions Indications: Chronic Constipation   4    aspirin 81 MG EC tablet Take 81 mg by mouth daily Take with food.  sertraline (ZOLOFT) 50 MG tablet Take 50 mg by mouth daily       atorvastatin (LIPITOR) 40 MG tablet Take 1 tablet by mouth nightly. (Patient taking differently: Take 40 mg by mouth nightly Indications: Increase in the Amount of Cholesterol in the Blood ) 30 tablet 0    metoprolol (LOPRESSOR) 50 MG tablet Take 1 tablet by mouth 2 times daily. (Patient taking differently: Take 50 mg by mouth 2 times daily Indications: Atrial Fibrillation ) 60 tablet 0    diltiazem (CARDIZEM CD) 180 MG ER capsule Take 1 capsule by mouth daily. (Patient taking differently: Take 180 mg by mouth daily Indications: Atrial Fibrillation ) 30 capsule 0     No current facility-administered medications for this visit. Past Medical History  Ray Bernstein  has a past medical history of Atrial fibrillation with rapid ventricular response ([address] Hypertension, Irregular heart beat, Stroke ([address] and Unspecified cerebral artery occlusion with cerebral infarction. Past Surgical History  The patient  has a past surgical history that includes transthoracic echocardiogram (08/23/2011); cardiovascular stress test (08/22/2011); Hydrocele surgery (Left, 12/6/13); Testicle surgery (2015); Hydrocele surgery (04/27/2017); lipoma resection (04/27/2017); Colonoscopy; and Colonoscopy (Left, 7/17/2018). Family History  This patient's family history includes Cancer in his father; Diabetes in his father and mother; Heart Disease in his mother. Social History  Ray Bernstein  reports that he has been smoking cigarettes. He has a 45.00 pack-year smoking history. He has never used smokeless tobacco. He reports previous alcohol use. He reports that he does not use drugs.       Subjective: Review of Systems   Constitutional: Negative for activity change, appetite change, chills, diaphoresis, fatigue, fever and unexpected weight change. Gastrointestinal: Negative for abdominal pain. Genitourinary: Negative for decreased urine volume, difficulty urinating, dysuria, flank pain, frequency, hematuria and urgency. Musculoskeletal: Negative for back pain. Objective:   Temp 97.2 °F (36.2 °C)   Ht 5' 10\" (1.778 m)   Wt 240 lb (108.9 kg)   BMI 34.44 kg/m²     Physical Exam  Constitutional:       General: He is not in acute distress. Appearance: Normal appearance. He is not ill-appearing or diaphoretic. HENT:      Head: Normocephalic and atraumatic. Right Ear: External ear normal.      Left Ear: External ear normal.      Nose: Nose normal.      Mouth/Throat:      Mouth: Mucous membranes are moist.   Eyes:      General: No scleral icterus. Right eye: No discharge. Left eye: No discharge. Pulmonary:      Effort: Pulmonary effort is normal. No respiratory distress. Abdominal:      Tenderness: There is no right CVA tenderness or left CVA tenderness. Musculoskeletal: Normal range of motion. Skin:     General: Skin is warm. Neurological:      Mental Status: He is alert and oriented to person, place, and time. Mental status is at baseline. Psychiatric:         Mood and Affect: Mood normal.         Behavior: Behavior normal.         Thought Content:  Thought content normal.         POC  Results for POC orders placed in visit on 10/06/20   POCT Urinalysis No Micro (Auto)   Result Value Ref Range    Glucose, Ur Negative NEGATIVE mg/dl    Bilirubin Urine Negative     Ketones, Urine Negative NEGATIVE    Specific Gravity, Urine >= 1.030 1.002 - 1.030    Blood, UA POC Negative NEGATIVE    pH, Urine 6.00 5.0 - 9.0    Protein, Urine Negative NEGATIVE mg/dl    Urobilinogen, Urine 1.00 0.0 - 1.0 eu/dl    Nitrite, Urine Negative NEGATIVE    Leukocyte Clumps, Urine Negative NEGATIVE    Color, Urine Yellow [id number]    Character, Urine Clear [id number].[id number]   poct post void residual   Result Value Ref Range    post void residual 0 ml         Patients recent PSA values are as follows  Lab Results   Component Value Date    PSA 1.34 (H) 03/04/2020        Recent BUN/Creatinine:  Lab Results   Component Value Date    BUN 15 11/18/2019    CREATININE 0.8 11/18/2019     patient    Narrative    PROCEDURE: US RENAL COMPLETE         CLINICAL INFORMATION: Gross hematuria.         COMPARISON: CT scan of the abdomen dated 5 August 2020.         TECHNIQUE: Grayscale and color images were obtained of both kidneys.           FINDINGS:         The right kidney measures 12.3 x 6.4 x 6.6 cm in size. There is a 1.4 x 1.5 x 1.2 cm area of fullness in the right renal cortex which may correspond to the previously noted abnormality on CT scan. There are small nonobstructing stones. There is no    hydronephrosis. The resistive index is 0.73. Cortical thickness 1.4 cm.         The left kidney measures 13.4 x 6.7 x 5.97 cm in size. The resistive index is 0.67. The cortical thickness measures 0.9 cm. The previously noted stones on CT scan are not clearly seen on ultrasound.         Urinary bladder volume prevoid baseline cc. Postvoid measured 0 mL.                   Impression         1. Small area of fullness in the right kidney measuring 1.4 x 1.5 x 1.2 cm in size which corresponds to the previously noted abnormality on CT scan. 2. Small nonobstructing stones in the right kidney. No associated hydronephrosis. 3. The previously noted stones in the left kidney are not clearly seen on ultrasound. There is no hydronephrosis on the left side. 4. Otherwise negative renal ultrasound. Assessment:   Recent UTI with gross hematuria  BPH with LUTs  R renal lesion  Bilateral nonobstructive nephrolithiasis  Scrotal sebaceous cyst  Current tobacco user  Hx CVA  Plan:       Pt's hematuria resolved.   Urine dip today negative for infection or blood. PVR 0 mls. Continue tamsulosin BID and finasteride. F/u as scheduled in August with renal US prior to appt. Call for any worsening in symptoms prior.

## 2020-10-15 ENCOUNTER — OFFICE VISIT (OUTPATIENT)
Dept: CARDIOLOGY CLINIC | Age: 70
End: 2020-10-15
Payer: MEDICARE

## 2020-10-15 VITALS
WEIGHT: 244.2 LBS | HEIGHT: 70 IN | BODY MASS INDEX: 34.96 KG/M2 | DIASTOLIC BLOOD PRESSURE: 78 MMHG | SYSTOLIC BLOOD PRESSURE: 128 MMHG | HEART RATE: 63 BPM

## 2020-10-15 PROCEDURE — 93000 ELECTROCARDIOGRAM COMPLETE: CPT | Performed by: INTERNAL MEDICINE

## 2020-10-15 PROCEDURE — 99213 OFFICE O/P EST LOW 20 MIN: CPT | Performed by: INTERNAL MEDICINE

## 2020-10-15 NOTE — PROGRESS NOTES
CD) 180 MG ER capsule, Take 1 capsule by mouth daily. (Patient taking differently: Take 180 mg by mouth daily Indications: Atrial Fibrillation ), Disp: 30 capsule, Rfl: 0    Past Medical History  Ray Bernstein  has a past medical history of Atrial fibrillation with rapid ventricular response ([address] Hypertension, Irregular heart beat, Stroke ([address] and Unspecified cerebral artery occlusion with cerebral infarction. Social History  Ray Bernstein  reports that he has been smoking cigarettes. He has a 45.00 pack-year smoking history. He has never used smokeless tobacco. He reports previous alcohol use. He reports that he does not use drugs. Family History  Ray Bernstein family history includes Cancer in his father; Diabetes in his father and mother; Heart Disease in his mother. Past Surgical History   Past Surgical History:   Procedure Laterality Date    CARDIOVASCULAR STRESS TEST  08/22/2011    EF 43% SUGGESTS ECHO    COLONOSCOPY      COLONOSCOPY Left 7/17/2018    COLONOSCOPY WITH BIOPSY performed by Fernando Waite MD at [address] [place] [address] [address] Left 12/6/13    Left Scrotal Exploration    HYDROCELE EXCISION  04/27/2017    LIPOMA RESECTION  04/27/2017    right shoulder lipoma resection    TESTICLE SURGERY  2015    TRANSTHORACIC ECHOCARDIOGRAM  08/23/2011    EF 50-55% A FIB C RVR,CONTROLLED HTN/STUDY WAS WITHIN NORMAL LIMITS       Subjective:     REVIEW OF SYSTEMS  Constitutional: denies sweats, chills and fever  HENT: denies  congestion, sinus pressure, sneezing and sore throat. Eyes: denies  pain, discharge, redness and itching. Respiratory: denies apnea, cough  Gastrointestinal: denies blood in stool, constipation, diarrhea   Endocrine: denies cold intolerance, heat intolerance, polydipsia. Genitourinary: denies dysuria, enuresis, flank pain and hematuria. Musculoskeletal: denies arthralgias, joint swelling and neck pain. Neurological: denies numbness and headaches.    Psychiatric/Behavioral: denies agitation, confusion, decreased concentration and dysphoric mood    All others reviewed and are negative. Objective:     /78   Pulse 63   Ht 5' 10\" (1.778 m)   Wt 244 lb 3.2 oz (110.8 kg)   BMI 35.04 kg/m²     Wt Readings from Last 3 Encounters:   10/15/20 244 lb 3.2 oz (110.8 kg)   10/06/20 240 lb (108.9 kg)   08/11/20 242 lb (109.8 kg)     BP Readings from Last 3 Encounters:   10/15/20 128/78   03/12/20 138/62   02/26/20 132/60       PHYSICAL EXAM  Constitutional: Oriented to person, place, and time. Appears well-developed and well-nourished. HENT:   Head: Normocephalic and atraumatic. Eyes: EOM are normal. Pupils are equal, round, and reactive to light. Neck: Normal range of motion. Neck supple. No JVD present. Cardiovascular: Normal rate , normal heart sounds and intact distal pulses. Pulmonary/Chest: Effort normal and breath sounds normal. No respiratory distress. No wheezes. No rales. Abdominal: Soft. Bowel sounds are normal. No distension. There is no tenderness. Musculoskeletal: Normal range of motion. No edema. Neurological: Alert and oriented to person, place, and time. No cranial nerve deficit. Coordination normal.   Skin: Skin is warm and dry. Psychiatric: Normal mood and affect.        Lab Results   Component Value Date    CKTOTAL 65 07/10/2012    CKTOTAL 39 08/23/2011    CKTOTAL 39 08/22/2011       Lab Results   Component Value Date    WBC 4 09/17/2020    WBC 9.0 11/18/2019     09/17/2020    HGB 14.7 07/14/2020    HCT 44.8 07/14/2020    MCV 96.8 11/18/2019    MCH 31.6 11/18/2019    MCHC 32.7 11/18/2019    RDW 14.8 04/18/2017     11/18/2019    MPV 10.3 11/18/2019       Lab Results   Component Value Date     11/18/2019    K 5.4 11/18/2019    K 4.2 08/02/2019     11/18/2019    CO2 24 11/18/2019    BUN 15 11/18/2019    LABALBU 4.2 11/18/2019    LABALBU 4.0 08/23/2011    CREATININE 0.8 11/18/2019    CALCIUM 9.6 11/18/2019    LABGLOM >90 11/18/2019    GLUCOSE Negative 09/17/2020    GLUCOSE 88 08/23/2011       Lab Results   Component Value Date    ALKPHOS 132 11/18/2019    ALT 66 11/18/2019    AST 43 11/18/2019    PROT 7.5 11/18/2019    BILITOT Negative 09/17/2020    BILIDIR 0.2 10/24/2012    LABALBU 4.2 11/18/2019    LABALBU 4.0 08/23/2011       Lab Results   Component Value Date    MG 2.3 07/16/2012       Lab Results   Component Value Date    INR 2.10 (H) 09/30/2020    INR 2.70 (H) 09/03/2020    INR 2.30 (H) 08/05/2020         Lab Results   Component Value Date    LABA1C 6.1 08/23/2011       Lab Results   Component Value Date    TRIG 87 11/18/2019    HDL 57 11/18/2019    LDLCALC 50 11/18/2019       Lab Results   Component Value Date    TSH 1.570 11/18/2019         Testing Reviewed:      I have individually reviewed the below cardiac tests    EKG: Afib rate controlled    ECHO:   Results for orders placed during the hospital encounter of 03/28/17   ECHO Complete 2D W Doppler W Color    Narrative Transthoracic Echocardiography Report (TTE)     Demographics      Patient Name    Ayesha Chow Gender                Male      MR #            [id number]     Race                                                      Ethnicity      Account #       [de-identified]       Room Number      Accession       [id number]     Date of Study         03/28/2017   Number      Date of Birth   1950    Referring Physician   Jamie Dewitt      Age             61 year(s)    Sonographer           Mery Olguin RADHA                                    Interpreting          Linus Cockayne DO                                 Physician     Procedure    Type of Study      TTE procedure:ECHOCARDIOGRAM COMPLETE 2D W DOPPLER W COLOR. Procedure Date  Date: 03/28/2017 Start: 09:12 AM    Study Location: Echo Lab  Technical Quality: Adequate visualization    Indications:Abnormal ECG.     Additional Medical History:Smoker, Hypertension, Atrial fibrillation, CVA,  Chest Pain. Patient Status: Routine    Height: 71 inches Weight: 224 pounds BSA: 2.21 m^2 BMI: 31.24 kg/m^2    BP: 146/74 mmHg     Conclusions      Summary   Systolic function was normal.   Ejection fraction is visually estimated at 55%. Mildly dilated right ventricle. Right ventricle global systolic function is mildly reduced . Signature      ----------------------------------------------------------------   Electronically signed by Xavier Castillo DO (Interpreting   physician) on 03/28/2017 at 06:54 PM   ----------------------------------------------------------------      Findings      Mitral Valve   Mild calcification of the posterior leaflet of the mitral valve. Trace mitral regurgitation is present. Aortic Valve   The aortic valve leaflets were not well visualized. The aortic valve appears to be trileaflet with good leaflet separation. Tricuspid Valve   Tricuspid valve is structurally normal.   Trivial to mild tricuspid regurgitation visualized. Pulmonic Valve   Pulmonic valve is structurally normal.   Trivial pulmonic regurgitation visualized. Left Atrium   Mildly dilated left atrium. Left Ventricle   Systolic function was normal.   Ejection fraction is visually estimated at 55%. Right Atrium   The right atrium is of normal size. Right Ventricle   Mildly dilated right ventricle. Right ventricle global systolic function is mildly reduced . Pericardial Effusion   There is a trivial pericardial effusion noted.      M-Mode/2D Measurements & Calculations      LV Diastolic    LV Systolic Dimension:    AV Cusp Separation: 2 cmLA   Dimension: 4.7  3.5 cm                    Dimension: 4.3 cmAO Root   cm              LV Volume Diastolic: 110  Dimension: 3.6 cmLA Area: 29.3   LV FS:25.5 %    ml                        cm^2   LV PW           LV Volume Systolic: 22.1   Diastolic: 1.3  ml   cm              LV EDV/LV EDV Index: 102   Septum          ml/46 m^2LV ESV/LV ESV    RV Diastolic Dimension: 4.5 cm   Diastolic: 1.3  Index: 10.5 ml/23 m^2   cm              EF Calculated: 50.1 %     LA/Aorta: 1.19                                                LA volume/Index: 106.5 ml /48m^2     Doppler Measurements & Calculations      MV Peak E-Wave: 73.5     AV Peak Velocity: 139  LVOT Peak Velocity: 94.3   cm/s                     cm/s                   cm/s                            AV Peak Gradient: 7.73 LVOT Peak Gradient: 4 mmHg   MV Peak Gradient: 2.16   mmHg   mmHg                                            TV Peak E-Wave: 70.1 cm/s      MV Deceleration Time:                           TV Peak Gradient: 1.97   208 msec                                        mmHg                            IVRT: 88 msec          TR Velocity:270 cm/s                                                   TR Gradient:29.16 mmHg                                                   PV Peak Velocity: 70.1                            AV DVI (Vmax):0.68     cm/s                                                   PV Peak Gradient: 1.97                                                   mmHg     [email or web address] [id number]       STRESS:    CATH:    Assessment/Plan       Diagnosis Orders   1. Atrial fibrillation, unspecified type (HCC)  EKG 12 Lead   2. SOB (shortness of breath) on exertion  EKG 12 Lead         AFib on coumadin  CVA  HTN  Smoker  claudication    Doing well today. [facility] Cath August 2019 showed patent coronaries   Patient states that he takes all his medications regularly  On coumadin and goes to coumadin clinic  No bleeding issues, understands bleeding risks  The patient is asked to make an attempt to improve diet and exercise patterns to aid in medical management of this problem.   Advised more plant based nutrition/meditarrean diet Advised patient to call office or seek immediate medical attention if there is any new onset of  any chest pain, sob, palpitations, lightheadedness, dizziness, orthopnea, PND or pedal edema. All medication side effects were discussed in details. Thank you for allowing me to participate in the care of this patient. Please do not hesitate to contact me for any further questions. Return in about 1 year (around 10/15/2021) for Regular follow up, Review testing.        Electronically signed by Argenis Bradford MD [facility]  10/15/2020 at 11:18 AM

## 2020-10-22 NOTE — TELEPHONE ENCOUNTER
Pt states family pcp sent script in on cardizem 180 for only daily, but pt takes it bid, is it ok to send a script for BID to [employer]

## 2020-10-23 RX ORDER — DILTIAZEM HYDROCHLORIDE 180 MG/1
180 CAPSULE, COATED, EXTENDED RELEASE ORAL DAILY
Qty: 90 CAPSULE | Refills: 2 | Status: SHIPPED | OUTPATIENT
Start: 2020-10-23

## 2020-10-23 NOTE — TELEPHONE ENCOUNTER
Reviewed and clarified with Dr Igor Bell. Cardizem 180 mg once daily. Mirella Vega [facility]) notified. New script pended.

## 2020-10-28 ENCOUNTER — HOSPITAL ENCOUNTER (OUTPATIENT)
Dept: PHARMACY | Age: 70
Setting detail: THERAPIES SERIES
Discharge: HOME | End: 2020-10-28
Payer: MEDICARE

## 2020-10-28 VITALS — TEMPERATURE: 97.9 F

## 2020-10-28 LAB — POC INR: 2.5 (ref 0.8–1.2)

## 2020-10-28 PROCEDURE — 85610 PROTHROMBIN TIME: CPT

## 2020-10-28 PROCEDURE — G0008 ADMIN INFLUENZA VIRUS VAC: HCPCS

## 2020-10-28 PROCEDURE — 6360000002 HC RX W HCPCS

## 2020-10-28 PROCEDURE — 96372 THER/PROPH/DIAG INJ SC/IM: CPT

## 2020-10-28 PROCEDURE — 36416 COLLJ CAPILLARY BLOOD SPEC: CPT

## 2020-10-28 PROCEDURE — 99211 OFF/OP EST MAY X REQ PHY/QHP: CPT

## 2020-10-28 PROCEDURE — 90686 IIV4 VACC NO PRSV 0.5 ML IM: CPT

## 2020-10-28 RX ADMIN — INFLUENZA A VIRUS A/VICTORIA/2454/2019 IVR-207 (H1N1) ANTIGEN (PROPIOLACTONE INACTIVATED), INFLUENZA A VIRUS A/HONG KONG/2671/2019 IVR-208 (H3N2) ANTIGEN (PROPIOLACTONE INACTIVATED), INFLUENZA B VIRUS B/VICTORIA/705/2018 BVR-11 ANTIGEN (PROPIOLACTONE INACTIVATED), INFLUENZA B VIRUS B/PHUKET/3073/2013 BVR-1B ANTIGEN (PROPIOLACTONE INACTIVATED) 0.5 ML: 15; 15; 15; 15 INJECTION, SUSPENSION INTRAMUSCULAR at 11:47

## 2020-10-28 NOTE — PROGRESS NOTES
immunization(s) administered. Patient tolerated well. See administration documentation on MAR.    [x] given    [] declined   [] received previously   [] plans to receive at a later time   [] refused    [x] documented in Epic

## 2020-11-23 ENCOUNTER — HOSPITAL ENCOUNTER (OUTPATIENT)
Dept: PHARMACY | Age: 70
Setting detail: THERAPIES SERIES
Discharge: HOME | End: 2020-11-23
Payer: MEDICARE

## 2020-11-23 VITALS — TEMPERATURE: 97.2 F

## 2020-11-23 LAB — POC INR: 2.2 (ref 0.8–1.2)

## 2020-11-23 PROCEDURE — 36416 COLLJ CAPILLARY BLOOD SPEC: CPT

## 2020-11-23 PROCEDURE — 85610 PROTHROMBIN TIME: CPT

## 2020-11-23 PROCEDURE — 99211 OFF/OP EST MAY X REQ PHY/QHP: CPT

## 2020-11-23 NOTE — PROGRESS NOTES
A user error has taken place: encounter opened in error, closed for administrative reasons.
[address] [facility] Fox Sadler PharmD, BCPS  11/23/2020  11:44 AM

## 2020-12-01 RX ORDER — WARFARIN SODIUM 5 MG/1
TABLET ORAL
Qty: 90 TABLET | Refills: 0 | Status: SHIPPED | OUTPATIENT
Start: 2020-12-01 | End: 2021-03-01

## 2020-12-30 ENCOUNTER — HOSPITAL ENCOUNTER (OUTPATIENT)
Dept: PHARMACY | Age: 70
Setting detail: THERAPIES SERIES
Discharge: HOME | End: 2020-12-30
Payer: MEDICARE

## 2020-12-30 ENCOUNTER — HOSPITAL ENCOUNTER (OUTPATIENT)
Age: 70
Discharge: HOME | End: 2020-12-30
Payer: MEDICARE

## 2020-12-30 VITALS — TEMPERATURE: 98 F

## 2020-12-30 LAB
HCT VFR BLD CALC: 46.3 % (ref 42–52)
HEMOGLOBIN: 15.2 GM/DL (ref 14–18)
POC INR: 2.9 (ref 0.8–1.2)

## 2020-12-30 PROCEDURE — 36415 COLL VENOUS BLD VENIPUNCTURE: CPT

## 2020-12-30 PROCEDURE — 99211 OFF/OP EST MAY X REQ PHY/QHP: CPT

## 2020-12-30 PROCEDURE — 85014 HEMATOCRIT: CPT

## 2020-12-30 PROCEDURE — 36416 COLLJ CAPILLARY BLOOD SPEC: CPT

## 2020-12-30 PROCEDURE — 85018 HEMOGLOBIN: CPT

## 2020-12-30 PROCEDURE — 85610 PROTHROMBIN TIME: CPT

## 2020-12-30 NOTE — PROGRESS NOTES
Medication Management [address]  [phone] (phone)  [phone] (fax)      Mr. Eliud Santacruz is a 78 y.o.  male with history of Afib who presents today for anticoagulation monitoring and adjustment. Patient verifies current dosing regimen and tablet strength. No missed or extra doses. Patient denies s/s bleeding/bruising/swelling/SOB/chest pain  No blood in urine or stool. No dietary changes. No changes in medication/OTC agents/Herbals. No change in alcohol use or tobacco use. No change in activity level. Patient denies headaches/dizziness/lightheadedness/falls. No vomiting/diarrhea or acute illness. No Procedures scheduled in the future at this time. Assessment:   Lab Results   Component Value Date    INR 2.90 (H) 12/30/2020    INR 2.20 (H) 11/23/2020    INR 2.50 (H) 10/28/2020     INR therapeutic   Recent Labs     12/30/20  1113   INR 2.90*     Patient interview completed and discussed with pharmacist by Radha Baca PharmD Candidate [id number]    5 previous consecutive therapeutic INRs    Plan:  Continue Coumadin 5mg MF 2.5mg STuWThS. Recheck INR in 5 weeks. Patient reminded to call the Anticoagulation Clinic with any signs or symptoms of bleeding or with any medication changes. Patient given instructions utilizing the teach back method. Discharged ambulatory in no apparent distress. After visit summary printed and reviewed with patient.       Medications reviewed and updated on home medication list Yes    Influenza vaccine:     [] given    [x] declined   [x] received previously   [] plans to receive at a later time   [] refused    [x] documented in [address]: MED [address]: No  Total # of Interventions Recommended: 0  - Maintenance Safety Lab Monitoring #: 1  Total Interventions Accepted: 0  Time Spent (min): 20

## 2021-02-03 ENCOUNTER — HOSPITAL ENCOUNTER (OUTPATIENT)
Dept: PHARMACY | Age: 71
Setting detail: THERAPIES SERIES
Discharge: HOME | End: 2021-02-03
Payer: MEDICARE

## 2021-02-03 VITALS — TEMPERATURE: 95 F

## 2021-02-03 LAB — POC INR: 2.3 (ref 0.8–1.2)

## 2021-02-03 PROCEDURE — 99211 OFF/OP EST MAY X REQ PHY/QHP: CPT

## 2021-02-03 PROCEDURE — 85610 PROTHROMBIN TIME: CPT

## 2021-02-03 PROCEDURE — 36416 COLLJ CAPILLARY BLOOD SPEC: CPT

## 2021-02-26 DIAGNOSIS — I48.0 PAF (PAROXYSMAL ATRIAL FIBRILLATION) (HCC): ICD-10-CM

## 2021-03-01 RX ORDER — WARFARIN SODIUM 5 MG/1
TABLET ORAL
Qty: 90 TABLET | Refills: 3 | Status: SHIPPED | OUTPATIENT
Start: 2021-03-01 | End: 2022-03-03

## 2021-03-10 ENCOUNTER — HOSPITAL ENCOUNTER (OUTPATIENT)
Dept: PHARMACY | Age: 71
Setting detail: THERAPIES SERIES
Discharge: HOME | End: 2021-03-10
Payer: MEDICARE

## 2021-03-10 VITALS — TEMPERATURE: 97.2 F

## 2021-03-10 DIAGNOSIS — I48.0 PAF (PAROXYSMAL ATRIAL FIBRILLATION) (HCC): ICD-10-CM

## 2021-03-10 LAB — POC INR: 2.8 (ref 0.8–1.2)

## 2021-03-10 PROCEDURE — 36416 COLLJ CAPILLARY BLOOD SPEC: CPT

## 2021-03-10 PROCEDURE — 99211 OFF/OP EST MAY X REQ PHY/QHP: CPT

## 2021-03-10 PROCEDURE — 85610 PROTHROMBIN TIME: CPT

## 2021-03-10 NOTE — PROGRESS NOTES
Medication Management [address]   [phone] (phone)  [phone] (fax)      Mr. Kade Acosta is a 78 y.o.  male with history of Afib who presents today for anticoagulation monitoring and adjustment. Patient verifies current dosing regimen and tablet strength. No missed or extra doses. Patient denies s/s bleeding/bruising/swelling/SOB/chest pain  No blood in urine or stool. No dietary changes. No changes in medication/OTC agents/Herbals. No change in alcohol use or tobacco use. No change in activity level. Patient denies headaches/dizziness/lightheadedness/falls. No vomiting/diarrhea or acute illness. No Procedures scheduled in the future at this time. Assessment:   Lab Results   Component Value Date    INR 2.80 (H) 03/10/2021    INR 2.30 (H) 02/03/2021    INR 2.90 (H) 12/30/2020     INR therapeutic   Recent Labs     03/10/21  1121   INR 2.80*     Patient has been stable on current regimen since August 2020. Plan:  Continue Coumadin 5 mg MWF and 2.5 mg TuThSaSu. Recheck INR in 6 week(s). Patient reminded to call the Anticoagulation Clinic with any signs or symptoms of bleeding or with any medication changes. Patient given instructions utilizing the teach back method. Discharged ambulatory in no apparent distress. After visit summary printed and reviewed with patient.       Medications reviewed and updated on home medication list Yes    Influenza vaccine:     [] given    [x] declined   [x] received previously   [] plans to receive at a later time   [] refused    [x] documented in [address] [facility] [address]: [address]: No  Total # of Interventions Recommended: 0  - Maintenance Safety Lab Monitoring #: 1  Total Interventions Accepted: 0  Time Spent (min): 6223  [facility], [place], BCPS  3/10/2021  11:29 AM

## 2021-04-21 ENCOUNTER — HOSPITAL ENCOUNTER (OUTPATIENT)
Dept: PHARMACY | Age: 71
Setting detail: THERAPIES SERIES
Discharge: HOME | End: 2021-04-21
Payer: MEDICARE

## 2021-04-21 DIAGNOSIS — Z79.01 ANTICOAGULATED ON COUMADIN: ICD-10-CM

## 2021-04-21 DIAGNOSIS — Z51.81 ENCOUNTER FOR THERAPEUTIC DRUG MONITORING: ICD-10-CM

## 2021-04-21 DIAGNOSIS — I48.0 PAF (PAROXYSMAL ATRIAL FIBRILLATION) (HCC): ICD-10-CM

## 2021-04-21 LAB — POC INR: 2.7 (ref 0.8–1.2)

## 2021-04-21 PROCEDURE — 36416 COLLJ CAPILLARY BLOOD SPEC: CPT

## 2021-04-21 PROCEDURE — 85610 PROTHROMBIN TIME: CPT

## 2021-04-21 PROCEDURE — 99211 OFF/OP EST MAY X REQ PHY/QHP: CPT

## 2021-04-21 NOTE — PROGRESS NOTES
Medication Management [address]   [phone] (phone)  [phone] (fax)      Mr. Katrin Fontaine is a 78 y.o.  male with history of Afib who presents today for anticoagulation monitoring and adjustment. Patient verifies current dosing regimen and tablet strength. No missed or extra doses. Patient denies s/s bleeding/bruising/swelling/SOB/chest pain  No blood in urine or stool. No dietary changes. No changes in medication/OTC agents/Herbals. No change in alcohol use or tobacco use. No change in activity level. Patient denies headaches/dizziness/lightheadedness/falls. No vomiting/diarrhea or acute illness. No Procedures scheduled in the future at this time. Patient received his first Moderna vaccine 21. Patient expressed concerns about people dying from the vaccine. Recommended that he get the second dose ASAP. Assessment:   Lab Results   Component Value Date    INR 2.70 (H) 2021    INR 2.80 (H) 03/10/2021    INR 2.30 (H) 2021     INR therapeutic   Recent Labs     21  1008   INR 2.70*         Plan:  Continue Coumadin 5 mg MWF, 2.5 mg TThSS. Recheck INR in 6 week(s). Patient reminded to call the Anticoagulation Clinic with any signs or symptoms of bleeding or with any medication changes. Patient given instructions utilizing the teach back method. Discharged ambulatory in no apparent distress. After visit summary printed and reviewed with patient.       Medications reviewed and updated on home medication list Yes    Influenza vaccine:     [] given    [] declined   [] received previously   [] plans to receive at a later time   [] refused    [x] documented in [address] Intervention Detail: Adherence Monitorin, Lab(s) Ordered and Vaccine Recommended/Administered   Total # of Interventions Recommended: 1   Total # of Interventions Accepted: 1   Time Spent (min): 20      [place]

## 2021-04-30 ENCOUNTER — TELEPHONE (OUTPATIENT)
Dept: CARDIOLOGY CLINIC | Age: 71
End: 2021-04-30

## 2021-04-30 NOTE — TELEPHONE ENCOUNTER
Cece Giordano called in stating patient went to urgent care to have a skin tag removed and they were concerned with blood pressure of 102/57. She states in the morning patient has intermittent dizziness and he sleeps a lot. She didn't have any other blood pressure readings. Please advise.

## 2021-05-01 NOTE — TELEPHONE ENCOUNTER
If he continues to have dizziness especially with standing - have him call back and he needs medication changes

## 2021-05-05 NOTE — TELEPHONE ENCOUNTER
Spoke with Hina Wilson. Hina Wilson states pt is still having the dizziness and dizziness while standing.

## 2021-06-01 DIAGNOSIS — Z79.01 ANTICOAGULATED ON COUMADIN: Primary | ICD-10-CM

## 2021-06-01 DIAGNOSIS — I48.0 PAF (PAROXYSMAL ATRIAL FIBRILLATION) (HCC): ICD-10-CM

## 2021-06-02 ENCOUNTER — HOSPITAL ENCOUNTER (OUTPATIENT)
Dept: PHARMACY | Age: 71
Setting detail: THERAPIES SERIES
Discharge: HOME | End: 2021-06-02
Payer: MEDICARE

## 2021-06-02 DIAGNOSIS — Z51.81 ENCOUNTER FOR THERAPEUTIC DRUG MONITORING: ICD-10-CM

## 2021-06-02 DIAGNOSIS — I48.0 PAF (PAROXYSMAL ATRIAL FIBRILLATION) (HCC): Primary | ICD-10-CM

## 2021-06-02 DIAGNOSIS — Z79.01 ANTICOAGULATED ON COUMADIN: ICD-10-CM

## 2021-06-02 LAB — POC INR: 2.8 (ref 0.8–1.2)

## 2021-06-02 PROCEDURE — 99211 OFF/OP EST MAY X REQ PHY/QHP: CPT

## 2021-06-02 PROCEDURE — 36416 COLLJ CAPILLARY BLOOD SPEC: CPT

## 2021-06-02 PROCEDURE — 85610 PROTHROMBIN TIME: CPT

## 2021-06-02 NOTE — PROGRESS NOTES
Medication Management [address]  [phone] (phone)  [phone] (fax)    Mr. Trudy Mae is a 78 y.o.  male with history of paroxysmal atrial fib. , per Dr. Boston Rodriges referral, who presents today for Warfarin monitoring and adjustment (6 week visit). Patient verifies current dosing regimen and tablet strength. No missed or extra doses. Patient denies bleeding/bruising/SOB/chest pain. Has usual left leg swelling. No blood in urine or stool. No dietary changes. Changes in medication/OTC agents/herbals: cardiologist decreased Metoprolol 4/30 for dizziness - none since. No change in alcohol use or tobacco use. No change in activity level. Patient denies headaches/dizziness/lightheadedness/falls. No vomiting/diarrhea or acute illness. No procedures scheduled in the future at this time. Sees Dr. Adrienne West 6/16. Assessment:   Lab Results   Component Value Date    INR 2.80 (H) 06/02/2021    INR 2.70 (H) 04/21/2021    INR 2.80 (H) 03/10/2021     INR therapeutic - goal 2-3. Recent Labs     06/02/21  1011   INR 2.80*       Plan:  POCT INR ordered/performed/result reviewed. Continue PO Coumadin 5 mg MWF, 2.5 mg TThSS. Recheck INR in 6 week(s). Patient reminded to call the Anticoagulation Clinic with any signs or symptoms of bleeding or with any medication changes. Patient given instructions utilizing the teach back method. Given routine H&H order - due end of month. After visit summary printed and reviewed with patient. Discharged ambulatory in no apparent distress, wearing mask, with male significant other. AVS given to RMA to fax to Dr. Adrienne West.

## 2021-07-14 ENCOUNTER — TELEPHONE (OUTPATIENT)
Dept: PHARMACY | Age: 71
End: 2021-07-14

## 2021-07-14 ENCOUNTER — HOSPITAL ENCOUNTER (OUTPATIENT)
Dept: PHARMACY | Age: 71
Setting detail: THERAPIES SERIES
Discharge: HOME | End: 2021-07-14
Payer: MEDICARE

## 2021-07-14 ENCOUNTER — HOSPITAL ENCOUNTER (OUTPATIENT)
Age: 71
Discharge: HOME | End: 2021-07-14
Payer: MEDICARE

## 2021-07-14 DIAGNOSIS — I48.0 PAF (PAROXYSMAL ATRIAL FIBRILLATION) (HCC): ICD-10-CM

## 2021-07-14 DIAGNOSIS — Z79.01 ANTICOAGULATED ON COUMADIN: ICD-10-CM

## 2021-07-14 DIAGNOSIS — I48.0 PAF (PAROXYSMAL ATRIAL FIBRILLATION) (HCC): Primary | ICD-10-CM

## 2021-07-14 DIAGNOSIS — Z51.81 ENCOUNTER FOR THERAPEUTIC DRUG MONITORING: ICD-10-CM

## 2021-07-14 LAB
BASOPHILS # BLD: 1.1 %
BASOPHILS ABSOLUTE: 0.1 THOU/MM3 (ref 0–0.1)
CHOLESTEROL, TOTAL: 116 MG/DL (ref 100–199)
EOSINOPHIL # BLD: 2.6 %
EOSINOPHILS ABSOLUTE: 0.2 THOU/MM3 (ref 0–0.4)
ERYTHROCYTE [DISTWIDTH] IN BLOOD BY AUTOMATED COUNT: 14 % (ref 11.5–14.5)
ERYTHROCYTE [DISTWIDTH] IN BLOOD BY AUTOMATED COUNT: 50.9 FL (ref 35–45)
HCT VFR BLD CALC: 46.7 % (ref 42–52)
HCT VFR BLD CALC: 47 % (ref 42–52)
HDLC SERPL-MCNC: 61 MG/DL
HEMOGLOBIN: 15 GM/DL (ref 14–18)
HEMOGLOBIN: 15.1 GM/DL (ref 14–18)
IMMATURE GRANS (ABS): 0.05 THOU/MM3 (ref 0–0.07)
IMMATURE GRANULOCYTES: 0.6 %
LDL CHOLESTEROL CALCULATED: 39 MG/DL
LYMPHOCYTES # BLD: 11.3 %
LYMPHOCYTES ABSOLUTE: 0.9 THOU/MM3 (ref 1–4.8)
MCH RBC QN AUTO: 31.6 PG (ref 26–33)
MCHC RBC AUTO-ENTMCNC: 32.1 GM/DL (ref 32.2–35.5)
MCV RBC AUTO: 98.3 FL (ref 80–94)
MONOCYTES # BLD: 7.9 %
MONOCYTES ABSOLUTE: 0.6 THOU/MM3 (ref 0.4–1.3)
NUCLEATED RED BLOOD CELLS: 0 /100 WBC
PLATELET # BLD: 185 THOU/MM3 (ref 130–400)
PMV BLD AUTO: 10.3 FL (ref 9.4–12.4)
POC INR: 3 (ref 0.8–1.2)
RBC # BLD: 4.78 MILL/MM3 (ref 4.7–6.1)
REASON FOR REJECTION: NORMAL
REJECTED TEST: NORMAL
SEG NEUTROPHILS: 76.5 %
SEGMENTED NEUTROPHILS ABSOLUTE COUNT: 6 THOU/MM3 (ref 1.8–7.7)
TRIGL SERPL-MCNC: 80 MG/DL (ref 0–199)
WBC # BLD: 7.9 THOU/MM3 (ref 4.8–10.8)

## 2021-07-14 PROCEDURE — 36416 COLLJ CAPILLARY BLOOD SPEC: CPT

## 2021-07-14 PROCEDURE — 85610 PROTHROMBIN TIME: CPT

## 2021-07-14 PROCEDURE — 85025 COMPLETE CBC W/AUTO DIFF WBC: CPT

## 2021-07-14 PROCEDURE — 80061 LIPID PANEL: CPT

## 2021-07-14 PROCEDURE — 99211 OFF/OP EST MAY X REQ PHY/QHP: CPT

## 2021-07-14 PROCEDURE — 36415 COLL VENOUS BLD VENIPUNCTURE: CPT

## 2021-07-14 NOTE — TELEPHONE ENCOUNTER
Today's routine H&H:  15/46.7 (15.2/46.3 on 12/30/20). Called patient's residence - not home.   Spoke with Patel Mccallum - advised her results are normal.

## 2021-07-14 NOTE — PROGRESS NOTES
Medication Management [address]   [phone] (phone)  [phone] (fax)    Mr. Hal Alanis is a 78 y.o.  male with history of paroxysmal atrial fib. , per Dr. Whitley Castrejon referral, who presents today for Warfarin monitoring and adjustment (6 week visit). Patient verifies current dosing regimen and tablet strength. No missed or extra doses. Patient denies bleeding/bruising/SOB/chest pain. Has usual swelling of feet. No blood in urine or stool. No dietary changes. No changes in medication/OTC agents/herbals. No change in alcohol use or tobacco use. No change in activity level. Patient denies headaches/dizziness/lightheadedness/falls. No vomiting/diarrhea or acute illness. No procedures scheduled in the future at this time. Assessment:   Lab Results   Component Value Date    INR 3.00 (H) 07/14/2021    INR 2.80 (H) 06/02/2021    INR 2.70 (H) 04/21/2021     INR therapeutic - goal 2-3. Recent Labs     07/14/21  1011   INR 3.00*       Plan:  POCT INR ordered/performed/result reviewed. Continue PO Coumadin 5 mg MWF, 2.5 mg TThSS. Recheck INR in 6 week(s). Patient reminded to call the Anticoagulation Clinic with any signs or symptoms of bleeding or with any medication changes. Patient given instructions utilizing the teach back method. States is doing fasting bloodwork today. Again given routine H&H order. After visit summary printed and reviewed with patient. Discharged ambulatory in no apparent distress, wearing mask, with male significant other. AVS given to A to fax to Dr. Kyree Sim.

## 2021-07-19 NOTE — TELEPHONE ENCOUNTER
Pt needs refill on flomax. Last ov was 10-6-2020:    Plan:         Pt's hematuria resolved. Urine dip today negative for infection or blood. PVR 0 mls. Continue tamsulosin BID and finasteride.       F/u as scheduled in August with renal US prior to appt. Call for any worsening in symptoms prior.        Requested Prescriptions     Pending Prescriptions Disp Refills    tamsulosin (FLOMAX) 0.4 MG capsule [Pharmacy Med Name: tamsulosin 0.4 mg capsule] 180 capsule 3     Sig: TAKE 1 CAPSULE BY MOUTH TWICE A DAY       pts next f/u is 8-.

## 2021-07-20 RX ORDER — TAMSULOSIN HYDROCHLORIDE 0.4 MG/1
CAPSULE ORAL
Qty: 180 CAPSULE | Refills: 3 | Status: SHIPPED | OUTPATIENT
Start: 2021-07-20 | End: 2022-06-07

## 2021-08-11 ENCOUNTER — HOSPITAL ENCOUNTER (OUTPATIENT)
Dept: ULTRASOUND IMAGING | Age: 71
Discharge: HOME | End: 2021-08-11
Payer: MEDICARE

## 2021-08-11 DIAGNOSIS — N28.1 RENAL CYST: ICD-10-CM

## 2021-08-11 PROCEDURE — 76770 US EXAM ABDO BACK WALL COMP: CPT

## 2021-08-17 ENCOUNTER — OFFICE VISIT (OUTPATIENT)
Dept: UROLOGY | Age: 71
End: 2021-08-17
Payer: MEDICARE

## 2021-08-17 VITALS
DIASTOLIC BLOOD PRESSURE: 84 MMHG | HEIGHT: 70 IN | BODY MASS INDEX: 35.4 KG/M2 | SYSTOLIC BLOOD PRESSURE: 136 MMHG | WEIGHT: 247.3 LBS

## 2021-08-17 DIAGNOSIS — R31.0 GROSS HEMATURIA: Primary | ICD-10-CM

## 2021-08-17 DIAGNOSIS — R31.29 MICROSCOPIC HEMATURIA: ICD-10-CM

## 2021-08-17 DIAGNOSIS — R33.8 BENIGN PROSTATIC HYPERPLASIA WITH URINARY RETENTION: ICD-10-CM

## 2021-08-17 DIAGNOSIS — N40.1 BENIGN PROSTATIC HYPERPLASIA WITH URINARY RETENTION: ICD-10-CM

## 2021-08-17 LAB
BILIRUBIN URINE: NEGATIVE
BLOOD URINE, POC: ABNORMAL
CHARACTER, URINE: CLEAR
COLOR, URINE: YELLOW
GLUCOSE URINE: NEGATIVE MG/DL
KETONES, URINE: NEGATIVE
LEUKOCYTE CLUMPS, URINE: ABNORMAL
NITRITE, URINE: NEGATIVE
PH, URINE: 6 (ref 5–9)
PROTEIN, URINE: NEGATIVE MG/DL
SPECIFIC GRAVITY, URINE: 1.02 (ref 1–1.03)
UROBILINOGEN, URINE: 1 EU/DL (ref 0–1)

## 2021-08-17 PROCEDURE — 99213 OFFICE O/P EST LOW 20 MIN: CPT | Performed by: NURSE PRACTITIONER

## 2021-08-17 PROCEDURE — 81003 URINALYSIS AUTO W/O SCOPE: CPT | Performed by: NURSE PRACTITIONER

## 2021-08-17 ASSESSMENT — ENCOUNTER SYMPTOMS
NAUSEA: 0
VOMITING: 0
BACK PAIN: 0
ABDOMINAL PAIN: 0

## 2021-08-17 NOTE — PATIENT INSTRUCTIONS
Patient Education        Stopping Smoking: Care Instructions  Your Care Instructions     Cigarette smokers crave the nicotine in cigarettes. Giving it up is much harder than simply changing a habit. Your body has to stop craving the nicotine. It is hard to quit, but you can do it. There are many tools that people use to quit smoking. You may find that combining tools works best for you. There are several steps to quitting. First you get ready to quit. Then you get support to help you. After that, you learn new skills and behaviors to become a nonsmoker. For many people, a necessary step is getting and using medicine. Your doctor will help you set up the plan that best meets your needs. You may want to attend a smoking cessation program to help you quit smoking. When you choose a program, look for one that has proven success. Ask your doctor for ideas. You will greatly increase your chances of success if you take medicine as well as get counseling or join a cessation program.  Some of the changes you feel when you first quit tobacco are uncomfortable. Your body will miss the nicotine at first, and you may feel short-tempered and grumpy. You may have trouble sleeping or concentrating. Medicine can help you deal with these symptoms. You may struggle with changing your smoking habits and rituals. The last step is the tricky one: Be prepared for the smoking urge to continue for a time. This is a lot to deal with, but keep at it. You will feel better. Follow-up care is a key part of your treatment and safety. Be sure to make and go to all appointments, and call your doctor if you are having problems. It's also a good idea to know your test results and keep a list of the medicines you take. How can you care for yourself at home? · Ask your family, friends, and coworkers for support. You have a better chance of quitting if you have help and support.   · Join a support group, such as Nicotine Anonymous, for people who nicotine. · Be prepared to keep trying. Most people are not successful the first few times they try to quit. Do not get mad at yourself if you smoke again. Make a list of things you learned and think about when you want to try again, such as next week, next month, or next year. Where can you learn more? Go to https://"[employer]pestuewmirian.[employer]. org and sign in to your Flashnotes account. Enter H970 in the EVIIVO box to learn more about \"Stopping Smoking: Care Instructions. \"     If you do not have an account, please click on the \"Sign Up Now\" link. Current as of: February 11, 2021               Content Version: 12.9  © 2006-2021 [employer]wise[employer]. Care instructions adapted under license by [facility] [place] ([facility] [place]). If you have questions about a medical condition or this instruction, always ask your healthcare professional. [address] any warranty or liability for your use of this information.

## 2021-08-17 NOTE — PROGRESS NOTES
[address] [address]  Dept: [phone]  Loc: [phone]    Visit Date: 8/17/2021        HPI:     Jose Luis Pichardo is a 78 y.o. male who presents today for:  Chief Complaint   Patient presents with    Hematuria    1 Year Follow Up     RD prior       HPI   Pt seen in follow up for gross hematuria. Pt has a hx of BPH with urinary retention that improved on Flomax BID and finasteride 5 mg daily. Cystoscopy was performed 8/11/2020 by Dr. Michael Cadet with findings of moderate lateral lobe hypertrophy of the prostate and bladder diverticulum on the dome. CT urogram performed for micro hematuria noted bilateral nonobstructive nephrolithiasis and a hyperdense lesion at the superior pole of the right kidney measuring approximately 1.1 cms--? Possible hemorrhagic cyst.  Repeat US recommended in one year.      Following office cystoscopy pt developed an episode of gross hematuria and was noted to have urine culture significant for infection. He was treated with a course of antibiotics and renal US was repeated noting stability of renal lesion. Pt is here today with repeat renal US. Pt reports he is urinating well. Denies any complaints. No gross hematuria.   No dysuria.        Current Outpatient Medications   Medication Sig Dispense Refill    tamsulosin (FLOMAX) 0.4 MG capsule TAKE 1 CAPSULE BY MOUTH TWICE A  capsule 3    metoprolol tartrate (LOPRESSOR) 25 MG tablet Take 1 tablet by mouth 2 times daily 90 tablet 3    warfarin (COUMADIN) 5 MG tablet TAKE AS DIRECTED BY COUMADIN CLINIC 90 TABLETS= 90 DAYS 90 tablet 3    finasteride (PROSCAR) 5 MG tablet TAKE 1 TABLET BY MOUTH ONCE A DAY 30 tablet 11    dilTIAZem (CARDIZEM CD) 180 MG extended release capsule Take 1 capsule by mouth daily 90 capsule 2    albuterol sulfate HFA (VENTOLIN HFA) 108 (90 Base) MCG/ACT inhaler Inhale 2 puffs into the lungs every 6 hours as needed for Wheezing       LINZESS 145 MCG capsule Take by mouth See Admin Instructions Indications: Chronic Constipation   4    aspirin 81 MG EC tablet Take 81 mg by mouth daily Indications: Treatment to Reduce Platelet Aggregation       sertraline (ZOLOFT) 50 MG tablet Take 50 mg by mouth daily       atorvastatin (LIPITOR) 40 MG tablet Take 1 tablet by mouth nightly. (Patient taking differently: Take 40 mg by mouth nightly Indications: Increase in the Amount of Cholesterol in the Blood ) 30 tablet 0     No current facility-administered medications for this visit. Past Medical History  Jayla Velazquez  has a past medical history of Atrial fibrillation with rapid ventricular response ([facility] [address] Hypertension, Irregular heart beat, Stroke ([facility] [address] and Unspecified cerebral artery occlusion with cerebral infarction. Past Surgical History  The patient  has a past surgical history that includes transthoracic echocardiogram (08/23/2011); cardiovascular stress test (08/22/2011); Hydrocele surgery (Left, 12/6/13); Testicle surgery (2015); Hydrocele surgery (04/27/2017); lipoma resection (04/27/2017); Colonoscopy; and Colonoscopy (Left, 7/17/2018). Family History  This patient's family history includes Cancer in his father; Diabetes in his father and mother; Heart Disease in his mother. Social History  Jayla Velazquez  reports that he has been smoking cigarettes. He has a 45.00 pack-year smoking history. He has never used smokeless tobacco. He reports previous alcohol use. He reports that he does not use drugs. Subjective:      Review of Systems   Constitutional: Negative for activity change, appetite change, chills, diaphoresis, fatigue, fever and unexpected weight change. Gastrointestinal: Negative for abdominal pain, nausea and vomiting. Genitourinary: Negative for decreased urine volume, difficulty urinating, dysuria, flank pain, frequency, hematuria and urgency. Musculoskeletal: Negative for back pain. Objective:   /84   Ht 5' 10\" (1.778 m)   Wt 247 lb 4.8 oz (112.2 kg)   BMI 35.48 kg/m²     Physical Exam  Vitals reviewed. Constitutional:       General: He is not in acute distress. Appearance: Normal appearance. He is well-developed. He is not ill-appearing or diaphoretic. HENT:      Head: Normocephalic and atraumatic. Right Ear: External ear normal.      Left Ear: External ear normal.      Nose: Nose normal.      Mouth/Throat:      Mouth: Mucous membranes are moist.   Eyes:      General: No scleral icterus. Right eye: No discharge. Left eye: No discharge. Neck:      Vascular: No JVD. Trachea: No tracheal deviation. Pulmonary:      Effort: Pulmonary effort is normal. No respiratory distress. Abdominal:      General: There is no distension. Tenderness: There is no abdominal tenderness. There is no right CVA tenderness or left CVA tenderness. Musculoskeletal:         General: Normal range of motion. Neurological:      Mental Status: He is alert and oriented to person, place, and time. Mental status is at baseline. Psychiatric:         Mood and Affect: Mood normal.         Behavior: Behavior normal.         Thought Content: Thought content normal.         POC  No results found for this visit on 08/17/21. Patients recent PSA values are as follows  Lab Results   Component Value Date    PSA 1.34 (H) 03/04/2020        Recent BUN/Creatinine:  Lab Results   Component Value Date    BUN 15 11/18/2019    CREATININE 0.8 11/18/2019       Radiology  The patient has had a Renal Ultrasound which I have reviewed along with its accompanying report. The study demonstrates bilateral renal cysts.       information found for this patient   Narrative   PROCEDURE: US RENAL COMPLETE       CLINICAL INFORMATION: Renal cyst .       COMPARISON: No prior study.       TECHNIQUE: Grayscale and color Doppler ultrasound       FINDINGS: Right   Right kidney is within normal limits of size and echogenicity is normal. There is no hydronephrosis. 1.6 x 1.0 sonolucent structure seen in the mid right kidney. No solid lesions are identified. The resistive index is normal.       Left:   1.2 x 0.9 x 1.0 cm lucency lower pole no other solid or cystic mass identified. Renal cortical echogenicity is normal is no hydronephrosis. The resistive indexes within normal limits.       Bladder   Bladder appears unremarkable. Bladder volume 84 mL. Bladder jets are not demonstrated. Patient declined voice and there are no post void images at the end of the exam.       RIGHT KIDNEY - 12.8 x 6.8 x 7.7 cm   Resistive Index - 0.66   Cortical Thickness - 1.3 cm       LEFT KIDNEY - 13.0 x 5.8 x 5.9 cm   Resistive Index - 0.73   Cortical Thickness - 1.7 cm       URINARY BLADDER   Pre-Void - 84 mL   Post-Void - declined            Impression   Small cyst is seen on the right and left kidney. Kidneys are otherwise unremarkable.           Assessment:   BPH with LUTs  Bilateral renal cysts  Bilateral nonobstructive nephrolithiasis  Scrotal sebaceous cyst  Current tobacco user  Hx CVA    Plan:     Pt urinating well. Renal US with bilateral renal cysts. F/u in 1 year with PVR with PSA a few days prior.

## 2021-08-19 ENCOUNTER — TELEPHONE (OUTPATIENT)
Dept: UROLOGY | Age: 71
End: 2021-08-19

## 2021-08-19 NOTE — TELEPHONE ENCOUNTER
----- Message from [facility]OL[facility] - CNP sent at 8/19/2021 12:55 PM EDT -----  Please let pt know cytology normal.

## 2021-08-25 ENCOUNTER — TELEPHONE (OUTPATIENT)
Dept: CARDIOLOGY CLINIC | Age: 71
End: 2021-08-25

## 2021-08-25 ENCOUNTER — HOSPITAL ENCOUNTER (OUTPATIENT)
Dept: PHARMACY | Age: 71
Setting detail: THERAPIES SERIES
Discharge: HOME | End: 2021-08-25
Payer: MEDICARE

## 2021-08-25 DIAGNOSIS — I48.91 ATRIAL FIBRILLATION, UNSPECIFIED TYPE (HCC): Primary | ICD-10-CM

## 2021-08-25 DIAGNOSIS — I48.0 PAF (PAROXYSMAL ATRIAL FIBRILLATION) (HCC): Primary | ICD-10-CM

## 2021-08-25 DIAGNOSIS — Z79.01 ANTICOAGULATED ON COUMADIN: ICD-10-CM

## 2021-08-25 DIAGNOSIS — Z51.81 ENCOUNTER FOR THERAPEUTIC DRUG MONITORING: ICD-10-CM

## 2021-08-25 LAB — POC INR: 2.6 (ref 0.8–1.2)

## 2021-08-25 PROCEDURE — 99211 OFF/OP EST MAY X REQ PHY/QHP: CPT

## 2021-08-25 PROCEDURE — 36416 COLLJ CAPILLARY BLOOD SPEC: CPT

## 2021-08-25 PROCEDURE — 85610 PROTHROMBIN TIME: CPT

## 2021-10-06 ENCOUNTER — HOSPITAL ENCOUNTER (OUTPATIENT)
Dept: PHARMACY | Age: 71
Setting detail: THERAPIES SERIES
Discharge: HOME | End: 2021-10-06
Payer: MEDICARE

## 2021-10-06 VITALS — TEMPERATURE: 97.3 F

## 2021-10-06 DIAGNOSIS — Z51.81 ENCOUNTER FOR THERAPEUTIC DRUG MONITORING: ICD-10-CM

## 2021-10-06 DIAGNOSIS — Z79.01 ANTICOAGULATED ON COUMADIN: ICD-10-CM

## 2021-10-06 DIAGNOSIS — I48.0 PAF (PAROXYSMAL ATRIAL FIBRILLATION) (HCC): Primary | ICD-10-CM

## 2021-10-06 LAB — POC INR: 3.3 (ref 0.8–1.2)

## 2021-10-06 PROCEDURE — 6360000002 HC RX W HCPCS

## 2021-10-06 PROCEDURE — 36416 COLLJ CAPILLARY BLOOD SPEC: CPT

## 2021-10-06 PROCEDURE — G0008 ADMIN INFLUENZA VIRUS VAC: HCPCS

## 2021-10-06 PROCEDURE — 96372 THER/PROPH/DIAG INJ SC/IM: CPT

## 2021-10-06 PROCEDURE — 99211 OFF/OP EST MAY X REQ PHY/QHP: CPT

## 2021-10-06 PROCEDURE — 90686 IIV4 VACC NO PRSV 0.5 ML IM: CPT

## 2021-10-06 PROCEDURE — 85610 PROTHROMBIN TIME: CPT

## 2021-10-06 RX ADMIN — INFLUENZA A VIRUS A/VICTORIA/2570/2019 IVR-215 (H1N1) ANTIGEN (PROPIOLACTONE INACTIVATED), INFLUENZA A VIRUS A/CAMBODIA/E0826360/2020 IVR-224 (H3N2) ANTIGEN (PROPIOLACTONE INACTIVATED), INFLUENZA B VIRUS B/VICTORIA/705/2018 BVR-11 ANTIGEN (PROPIOLACTONE INACTIVATED), INFLUENZA B VIRUS B/PHUKET/3073/2013 BVR-1B ANTIGEN (PROPIOLACTONE INACTIVATED) 0.5 ML: 15; 15; 15; 15 INJECTION, SUSPENSION INTRAMUSCULAR at 10:18

## 2021-10-06 NOTE — PROGRESS NOTES
Medication Management [address]   [phone] (phone)  [phone] (fax)    Mr. Damaso Fu is a 73 y.o.  male with history of paroxysmal atrial fib. , per Dr. Richard Ding referral, who presents today for Warfarin monitoring and adjustment (6 week visit). Patient verifies current dosing regimen and tablet strength. No missed or extra doses. Patient denies bleeding/bruising/swelling/SOB/chest pain. No blood in urine or stool. No dietary changes. No changes in medication/OTC agents/herbals. No change in alcohol use or tobacco use. No change in activity level. Patient denies headaches/dizziness/lightheadedness/falls. No vomiting/diarrhea or acute illness. No procedures scheduled in the future at this time. Assessment:   Lab Results   Component Value Date    INR 3.30 (H) 10/06/2021    INR 2.60 (H) 08/25/2021    INR 3.00 (H) 07/14/2021     INR supratherapeutic - goal 2-3. Recent Labs     10/06/21  1011   INR 3.30*       Plan:  POCT INR ordered/performed/result reviewed. 2.5 mg today, W (per policy), then continue PO Coumadin 5 mg MWF, 2.5 mg TThSS. Recheck INR in 3 week(s), per policy. Patient reminded to call the Anticoagulation Clinic with any signs or symptoms of bleeding or with any medication changes. Patient given instructions utilizing the teach back method. Advised extra caution. After obtaining consent, Afluria vaccine given IM right deltoid Z-track, per policy/procedure. Patient instructed to remain in clinic for 20 minutes afterwards, and to report any adverse reaction to staff immediately. [facility] Vaccine Information Sheet given to patient for immunization(s) administered. Patient tolerated well. See administration documentation on MAR. After visit summary printed and reviewed with patient. Discharged ambulatory in no apparent distress, wearing mask, with male significant other. AVS given to RMA to fax to Dr. Lisa Reyes.

## 2021-10-21 ENCOUNTER — OFFICE VISIT (OUTPATIENT)
Dept: CARDIOLOGY CLINIC | Age: 71
End: 2021-10-21
Payer: MEDICARE

## 2021-10-21 VITALS
WEIGHT: 244 LBS | HEART RATE: 68 BPM | HEIGHT: 70 IN | DIASTOLIC BLOOD PRESSURE: 64 MMHG | BODY MASS INDEX: 34.93 KG/M2 | SYSTOLIC BLOOD PRESSURE: 112 MMHG

## 2021-10-21 DIAGNOSIS — I48.91 ATRIAL FIBRILLATION, UNSPECIFIED TYPE (HCC): Primary | ICD-10-CM

## 2021-10-21 PROCEDURE — 99214 OFFICE O/P EST MOD 30 MIN: CPT | Performed by: INTERNAL MEDICINE

## 2021-10-21 PROCEDURE — 93000 ELECTROCARDIOGRAM COMPLETE: CPT | Performed by: INTERNAL MEDICINE

## 2021-10-21 NOTE — PROGRESS NOTES
[address] [facility] [address] [place] [address]  Dept: [phone]  Dept Fax: [phone]  Loc: [phone]      Visit Date: 10/21/2021    Mr. Henrietta Smith is a 73 y.o. male  who presented for:  Chief Complaint   Patient presents with    Check-Up    Atrial Fibrillation       HPI:   72 yo M c hx of Afib on coumadin, CVA, HTN is here for 3 month follow up afib, HTN and heart cath. Patient currently smokes, 1 ppd. Doing well. Denies any chest pain, sob, palpitations, lightheadedness, dizziness, orthopnea, PND or pedal edema. Current Outpatient Medications:     tamsulosin (FLOMAX) 0.4 MG capsule, TAKE 1 CAPSULE BY MOUTH TWICE A DAY, Disp: 180 capsule, Rfl: 3    metoprolol tartrate (LOPRESSOR) 25 MG tablet, Take 1 tablet by mouth 2 times daily, Disp: 90 tablet, Rfl: 3    warfarin (COUMADIN) 5 MG tablet, TAKE AS DIRECTED BY COUMADIN CLINIC 90 TABLETS= 90 DAYS, Disp: 90 tablet, Rfl: 3    finasteride (PROSCAR) 5 MG tablet, TAKE 1 TABLET BY MOUTH ONCE A DAY, Disp: 30 tablet, Rfl: 11    dilTIAZem (CARDIZEM CD) 180 MG extended release capsule, Take 1 capsule by mouth daily, Disp: 90 capsule, Rfl: 2    albuterol sulfate HFA (VENTOLIN HFA) 108 (90 Base) MCG/ACT inhaler, Inhale 2 puffs into the lungs every 6 hours as needed for Wheezing , Disp: , Rfl:     LINZESS 145 MCG capsule, Take by mouth See Admin Instructions Indications: Chronic Constipation , Disp: , Rfl: 4    aspirin 81 MG EC tablet, Take 81 mg by mouth daily Indications: Treatment to Reduce Platelet Aggregation , Disp: , Rfl:     sertraline (ZOLOFT) 50 MG tablet, Take 50 mg by mouth daily , Disp: , Rfl:     atorvastatin (LIPITOR) 40 MG tablet, Take 1 tablet by mouth nightly.  (Patient taking differently: Take 40 mg by mouth nightly Indications: Increase in the Amount of Cholesterol in the Blood ), Disp: 30 tablet, Rfl: 0    Past Medical History  Veronica Garcia  has a past medical history of Atrial fibrillation with rapid ventricular response ([address] Hypertension, Irregular heart beat, Stroke ([facility] [address] and Unspecified cerebral artery occlusion with cerebral infarction. Social History  Erika Gong  reports that he has been smoking cigarettes. He has a 45.00 pack-year smoking history. He has never used smokeless tobacco. He reports previous alcohol use. He reports that he does not use drugs. Family History  Erika Gong family history includes Cancer in his father; Diabetes in his father and mother; Heart Disease in his mother. Past Surgical History   Past Surgical History:   Procedure Laterality Date    CARDIOVASCULAR STRESS TEST  08/22/2011    EF 43% SUGGESTS ECHO    COLONOSCOPY      COLONOSCOPY Left 7/17/2018    COLONOSCOPY WITH BIOPSY performed by Annamaria Ford MD at [address] [place] [address] [address] Left 12/6/13    Left Scrotal Exploration    HYDROCELE EXCISION  04/27/2017    LIPOMA RESECTION  04/27/2017    right shoulder lipoma resection    TESTICLE SURGERY  2015    TRANSTHORACIC ECHOCARDIOGRAM  08/23/2011    EF 50-55% A FIB C RVR,CONTROLLED HTN/STUDY WAS WITHIN NORMAL LIMITS       Subjective:     REVIEW OF SYSTEMS  Constitutional: denies sweats, chills and fever  HENT: denies  congestion, sinus pressure, sneezing and sore throat. Eyes: denies  pain, discharge, redness and itching. Respiratory: denies apnea, cough  Gastrointestinal: denies blood in stool, constipation, diarrhea   Endocrine: denies cold intolerance, heat intolerance, polydipsia. Genitourinary: denies dysuria, enuresis, flank pain and hematuria. Musculoskeletal: denies arthralgias, joint swelling and neck pain. Neurological: denies numbness and headaches. Psychiatric/Behavioral: denies agitation, confusion, decreased concentration and dysphoric mood    All others reviewed and are negative.    Objective:     /64   Pulse 68   Ht 5' 10\" (1.778 m)   Wt 244 lb (110.7 kg)   BMI 35.01 kg/m²     Wt Readings from Last 3 Encounters:   10/21/21 244 lb (110.7 kg)   08/17/21 247 lb 4.8 oz (112.2 kg)   10/15/20 244 lb 3.2 oz (110.8 kg)     BP Readings from Last 3 Encounters:   10/21/21 112/64   08/17/21 136/84   10/15/20 128/78       PHYSICAL EXAM  Constitutional: Oriented to person, place, and time. Appears well-developed and well-nourished. HENT:   Head: Normocephalic and atraumatic. Eyes: EOM are normal. Pupils are equal, round, and reactive to light. Neck: Normal range of motion. Neck supple. No JVD present. Cardiovascular: Normal rate ,irregular normal heart sounds and intact distal pulses. Pulmonary/Chest: Effort normal and breath sounds normal. No respiratory distress. No wheezes. No rales. Abdominal: Soft. Bowel sounds are normal. No distension. There is no tenderness. Musculoskeletal: Normal range of motion. No edema. Neurological: Alert and oriented to person, place, and time. No cranial nerve deficit. Coordination normal.   Skin: Skin is warm and dry. Psychiatric: Normal mood and affect.        Lab Results   Component Value Date    CKTOTAL 65 07/10/2012    CKTOTAL 39 08/23/2011    CKTOTAL 39 08/22/2011       Lab Results   Component Value Date    WBC 7.9 07/14/2021    RBC 4.78 07/14/2021     09/17/2020    HGB 15.0 07/14/2021    HCT 46.7 07/14/2021    MCV 98.3 07/14/2021    MCH 31.6 07/14/2021    MCHC 32.1 07/14/2021    RDW 14.8 04/18/2017     07/14/2021    MPV 10.3 07/14/2021       Lab Results   Component Value Date     11/18/2019    K 5.4 11/18/2019    K 4.2 08/02/2019     11/18/2019    CO2 24 11/18/2019    BUN 15 11/18/2019    LABALBU 4.2 11/18/2019    LABALBU 4.0 08/23/2011    CREATININE 0.8 11/18/2019    CALCIUM 9.6 11/18/2019    LABGLOM >90 11/18/2019    GLUCOSE Negative 09/17/2020    GLUCOSE 88 08/23/2011       Lab Results   Component Value Date    ALKPHOS 132 11/18/2019    ALT 66 11/18/2019    AST 43 11/18/2019    PROT 7.5 11/18/2019    BILITOT Negative 09/17/2020 BILIDIR 0.2 10/24/2012    LABALBU 4.2 11/18/2019    LABALBU 4.0 08/23/2011       Lab Results   Component Value Date    MG 2.3 07/16/2012       Lab Results   Component Value Date    INR 3.30 (H) 10/06/2021    INR 2.60 (H) 08/25/2021    INR 3.00 (H) 07/14/2021         Lab Results   Component Value Date    LABA1C 6.1 08/23/2011       Lab Results   Component Value Date    TRIG 80 07/14/2021    HDL 61 07/14/2021    LDLCALC 39 07/14/2021       Lab Results   Component Value Date    TSH 1.570 11/18/2019         Testing Reviewed:      I have individually reviewed the below cardiac tests    EKG: Afib rate controlled    ECHO:   Results for orders placed during the hospital encounter of 03/28/17   ECHO Complete 2D W Doppler W Color    Narrative Transthoracic Echocardiography Report (TTE)     Demographics      Patient Name    Maurisio Ryan Gender                Male      MR #            [id number]     Race                                                      Ethnicity      Account #       [de-identified]       Room Number      Accession       [id number]     Date of Study         03/28/2017   Number      Date of Birth   1950    Referring Physician   Azul Prince      Age             61 year(s)    Sonographer           Saurabh Mccall RDCS                                    Interpreting          [facility]                                 Physician     Procedure    Type of Study      TTE procedure:ECHOCARDIOGRAM COMPLETE 2D W DOPPLER W COLOR. Procedure Date  Date: 03/28/2017 Start: 09:12 AM    Study Location: Echo Lab  Technical Quality: Adequate visualization    Indications:Abnormal ECG. Additional Medical History:Smoker, Hypertension, Atrial fibrillation, CVA,  Chest Pain.     Patient Status: Routine    Height: 71 inches Weight: 224 pounds BSA: 2.21 m^2 BMI: 31.24 kg/m^2    BP: 146/74 mmHg     Conclusions      Summary   Systolic function was normal.   Ejection fraction is visually estimated at 55%. Mildly dilated right ventricle. Right ventricle global systolic function is mildly reduced . Signature      ----------------------------------------------------------------   Electronically signed by Giselle Theodore DO (Interpreting   physician) on 03/28/2017 at 06:54 PM   ----------------------------------------------------------------      Findings      Mitral Valve   Mild calcification of the posterior leaflet of the mitral valve. Trace mitral regurgitation is present. Aortic Valve   The aortic valve leaflets were not well visualized. The aortic valve appears to be trileaflet with good leaflet separation. Tricuspid Valve   Tricuspid valve is structurally normal.   Trivial to mild tricuspid regurgitation visualized. Pulmonic Valve   Pulmonic valve is structurally normal.   Trivial pulmonic regurgitation visualized. Left Atrium   Mildly dilated left atrium. Left Ventricle   Systolic function was normal.   Ejection fraction is visually estimated at 55%. Right Atrium   The right atrium is of normal size. Right Ventricle   Mildly dilated right ventricle. Right ventricle global systolic function is mildly reduced . Pericardial Effusion   There is a trivial pericardial effusion noted.      M-Mode/2D Measurements & Calculations      LV Diastolic    LV Systolic Dimension:    AV Cusp Separation: 2 cmLA   Dimension: 4.7  3.5 cm                    Dimension: 4.3 cmAO Root   cm              LV Volume Diastolic: 057  Dimension: 3.6 cmLA Area: 29.3   LV FS:25.5 %    ml                        cm^2   LV PW           LV Volume Systolic: 46.5   Diastolic: 1.3  ml   cm              LV EDV/LV EDV Index: 102   Septum          ml/46 m^2LV ESV/LV ESV    RV Diastolic Dimension: 4.5 cm   Diastolic: 1.3  Index: 09.9 ml/23 m^2   cm              EF Calculated: 50.1 %     LA/Aorta: 1.19                                                LA orthopnea, PND or pedal edema. All medication side effects were discussed in details. Thank you for allowing me to participate in the care of this patient. Please do not hesitate to contact me for any further questions. Return in about 1 year (around 10/21/2022), or if symptoms worsen or fail to improve, for Regular follow up, Review testing.        Electronically signed by Shea Drake MD [facility]  10/21/2021 at 11:18 AM

## 2021-10-21 NOTE — PROGRESS NOTES
Pt here for 1 yr check up     EKG done today    Pt asking about the ASA and if needed has stopped 2 days ago due to news report to stop it     Pt continues chest pain, pain did not radiate, not noticed for awhile, pt thinks may have been something he ate,heart palpitations, swelling in legs and feet, dizziness, sob

## 2021-10-27 ENCOUNTER — HOSPITAL ENCOUNTER (OUTPATIENT)
Dept: PHARMACY | Age: 71
Setting detail: THERAPIES SERIES
Discharge: HOME | End: 2021-10-27
Payer: MEDICARE

## 2021-10-27 DIAGNOSIS — Z51.81 ENCOUNTER FOR THERAPEUTIC DRUG MONITORING: ICD-10-CM

## 2021-10-27 DIAGNOSIS — I48.0 PAF (PAROXYSMAL ATRIAL FIBRILLATION) (HCC): Primary | ICD-10-CM

## 2021-10-27 DIAGNOSIS — Z79.01 ANTICOAGULATED ON COUMADIN: ICD-10-CM

## 2021-10-27 LAB — POC INR: 2.5 (ref 0.8–1.2)

## 2021-10-27 PROCEDURE — 36416 COLLJ CAPILLARY BLOOD SPEC: CPT

## 2021-10-27 PROCEDURE — 85610 PROTHROMBIN TIME: CPT

## 2021-10-27 PROCEDURE — 99211 OFF/OP EST MAY X REQ PHY/QHP: CPT

## 2021-10-27 NOTE — PROGRESS NOTES
Medication Management [address] [place] [address]  [phone] (phone)  [phone] (fax)    Mr. Isidra Epstein is a 73 y.o.  male with history of Afib who presents today for anticoagulation monitoring and adjustment. Patient verifies current dosing regimen and tablet strength. No missed or extra doses. Patient denies s/s bleeding/bruising/swelling/SOB/chest pain  No blood in urine or stool. No dietary changes. No changes in medication/OTC agents/Herbals. No change in alcohol use or tobacco use. No change in activity level. Patient denies headaches/lightheadedness/falls. Patient occasionally gets dizzy if he stands up too fast.   No vomiting/diarrhea or acute illness. No Procedures scheduled in the future at this time. Assessment:   Lab Results   Component Value Date    INR 2.50 (H) 10/27/2021    INR 3.30 (H) 10/06/2021    INR 2.60 (H) 08/25/2021     INR therapeutic   Recent Labs     10/27/21  1031   INR 2.50*     Patient has been stable on current regimen since August 2020 with only one slightly supratherapeutic INR since that time. Plan:  Continue Coumadin 5 mg MWF and 2.5 mg TuThSaSu. Recheck INR in 5 week(s). Patient reminded to call the Anticoagulation Clinic with any signs or symptoms of bleeding or with any medication changes. Patient given instructions utilizing the teach back method. After visit summary printed and reviewed with patient. Discharged ambulatory in no apparent distress.     For Pharmacy Admin Tracking Only     Total # of Interventions Recommended: 0   Total # of Interventions Accepted: 0   Time Spent (min): 9064  [facility]ian HospitalGeronimo, BCPS  10/27/2021  11:18 AM

## 2021-12-01 ENCOUNTER — HOSPITAL ENCOUNTER (OUTPATIENT)
Dept: PHARMACY | Age: 71
Setting detail: THERAPIES SERIES
Discharge: HOME | End: 2021-12-01
Payer: MEDICARE

## 2021-12-01 DIAGNOSIS — I48.0 PAF (PAROXYSMAL ATRIAL FIBRILLATION) (HCC): Primary | ICD-10-CM

## 2021-12-01 DIAGNOSIS — Z51.81 ENCOUNTER FOR THERAPEUTIC DRUG MONITORING: ICD-10-CM

## 2021-12-01 DIAGNOSIS — Z79.01 ANTICOAGULATED ON COUMADIN: ICD-10-CM

## 2021-12-01 LAB — POC INR: 3 (ref 0.8–1.2)

## 2021-12-01 PROCEDURE — 85610 PROTHROMBIN TIME: CPT

## 2021-12-01 PROCEDURE — 36416 COLLJ CAPILLARY BLOOD SPEC: CPT

## 2021-12-01 PROCEDURE — 99211 OFF/OP EST MAY X REQ PHY/QHP: CPT

## 2021-12-01 NOTE — PROGRESS NOTES
Medication Management [address]   [phone] (phone)  [phone] (fax)    Mr. Isis Angulo is a 73 y.o.  male with history of paroxysmal atrial fib. , per Dr. Allen Araujo referral, who presents today for Warfarin monitoring and adjustment (5 week visit). Patient verifies current dosing regimen and tablet strength. No missed or extra doses. Patient denies bleeding/bruising/swelling/SOB/chest pain. No blood in urine or stool. No dietary changes. No changes in medication/OTC agents/herbals. No change in alcohol use or tobacco use. No change in activity level. Patient denies headaches/dizziness/lightheadedness/falls. No vomiting/diarrhea or acute illness. No procedures scheduled in the future at this time. Assessment:   Lab Results   Component Value Date    INR 3.00 (H) 12/01/2021    INR 2.50 (H) 10/27/2021    INR 3.30 (H) 10/06/2021     INR therapeutic - goal 2-3. Recent Labs     12/01/21  1015   INR 3.00*       Plan:  POCT INR ordered/performed/result reviewed. Continue PO Coumadin 5 mg MWF, 2.5 mg TThSS. Recheck INR in 5 week(s). Patient reminded to call the Anticoagulation Clinic with any signs or symptoms of bleeding or with any medication changes. Patient given instructions utilizing the teach back method. After visit summary printed and reviewed with patient. Discharged ambulatory in no apparent distress, wearing mask, with male significant other. AVS given to pharmacy tech. to fax to Dr. Mariela Werner.

## 2021-12-06 RX ORDER — FINASTERIDE 5 MG/1
TABLET, FILM COATED ORAL
Qty: 30 TABLET | Refills: 11 | Status: SHIPPED | OUTPATIENT
Start: 2021-12-06

## 2021-12-06 NOTE — TELEPHONE ENCOUNTER
Raina Marie called requesting a refill on the following medications:  Requested Prescriptions     Pending Prescriptions Disp Refills    finasteride (PROSCAR) 5 MG tablet [Pharmacy Med Name: finasteride 5 mg tablet] 30 tablet 11     Sig: TAKE 1 TABLET BY MOUTH ONCE A DAY     Pharmacy verified:  .[email or web address]      Date of last visit: 08/17/2021  Date of next visit (if applicable): 33/29/8398

## 2022-01-05 ENCOUNTER — HOSPITAL ENCOUNTER (OUTPATIENT)
Dept: PHARMACY | Age: 72
Setting detail: THERAPIES SERIES
Discharge: HOME | End: 2022-01-05
Payer: MEDICARE

## 2022-01-05 DIAGNOSIS — Z79.01 ANTICOAGULATED ON COUMADIN: Primary | ICD-10-CM

## 2022-01-05 LAB — POC INR: 2.6 (ref 0.8–1.2)

## 2022-01-05 PROCEDURE — 85610 PROTHROMBIN TIME: CPT

## 2022-01-05 PROCEDURE — 99211 OFF/OP EST MAY X REQ PHY/QHP: CPT

## 2022-01-05 PROCEDURE — 36416 COLLJ CAPILLARY BLOOD SPEC: CPT

## 2022-02-09 ENCOUNTER — HOSPITAL ENCOUNTER (OUTPATIENT)
Dept: PHARMACY | Age: 72
Setting detail: THERAPIES SERIES
Discharge: HOME | End: 2022-02-09
Payer: MEDICARE

## 2022-02-09 DIAGNOSIS — Z79.01 ANTICOAGULATED ON COUMADIN: ICD-10-CM

## 2022-02-09 DIAGNOSIS — I48.0 PAF (PAROXYSMAL ATRIAL FIBRILLATION) (HCC): Primary | ICD-10-CM

## 2022-02-09 DIAGNOSIS — Z51.81 ENCOUNTER FOR THERAPEUTIC DRUG MONITORING: ICD-10-CM

## 2022-02-09 LAB — POC INR: 2.5 (ref 0.8–1.2)

## 2022-02-09 PROCEDURE — 99211 OFF/OP EST MAY X REQ PHY/QHP: CPT

## 2022-02-09 PROCEDURE — 36416 COLLJ CAPILLARY BLOOD SPEC: CPT

## 2022-02-09 PROCEDURE — 85610 PROTHROMBIN TIME: CPT

## 2022-02-09 NOTE — PROGRESS NOTES
Medication Management [address]  [phone] (phone)  [phone] (fax)    Mr. Lauren Clifford is a 73 y.o.  male with history of Afib, CVA who presents today for anticoagulation monitoring and adjustment. Pt presents with his brother-in-law who assists with communication as patient has some aphasia since his stroke. Patient verifies current dosing regimen and tablet strength. No missed or extra doses. Patient denies s/s bleeding/bruising/swelling/SOB/chest pain  No blood in urine or stool. No dietary changes. No changes in medication/OTC agents/Herbals. No change in alcohol use or tobacco use. No change in activity level. Patient denies headaches/dizziness/lightheadedness/falls. No vomiting/diarrhea or acute illness. No Procedures scheduled in the future at this time. Assessment:   Lab Results   Component Value Date    INR 2.50 (H) 02/09/2022    INR 2.60 (H) 01/05/2022    INR 3.00 (H) 12/01/2021     INR therapeutic   Recent Labs     02/09/22  1023   INR 2.50*     Reviewed H&H with patient. All WNL. Plan:  Continue Coumadin 5 mg MWF, 2.5mg TThSaS. Recheck INR in 6 week(s). Patient reminded to call the Anticoagulation Clinic with any signs or symptoms of bleeding or with any medication changes. Patient given instructions utilizing the teach back method. After visit summary printed and reviewed with patient. Discharged ambulatory in no apparent distress.     For Pharmacy Admin Tracking Only   Time Spent (min): 20

## 2022-03-03 DIAGNOSIS — I48.0 PAF (PAROXYSMAL ATRIAL FIBRILLATION) (HCC): ICD-10-CM

## 2022-03-03 RX ORDER — WARFARIN SODIUM 5 MG/1
TABLET ORAL
Qty: 90 TABLET | Refills: 3 | Status: SHIPPED | OUTPATIENT
Start: 2022-03-03

## 2022-03-23 ENCOUNTER — HOSPITAL ENCOUNTER (OUTPATIENT)
Dept: PHARMACY | Age: 72
Setting detail: THERAPIES SERIES
Discharge: HOME | End: 2022-03-23
Payer: MEDICARE

## 2022-03-23 DIAGNOSIS — Z79.01 ANTICOAGULATED ON COUMADIN: ICD-10-CM

## 2022-03-23 DIAGNOSIS — I48.0 PAF (PAROXYSMAL ATRIAL FIBRILLATION) (HCC): Primary | ICD-10-CM

## 2022-03-23 DIAGNOSIS — Z51.81 ENCOUNTER FOR THERAPEUTIC DRUG MONITORING: ICD-10-CM

## 2022-03-23 LAB — POC INR: 2.8 (ref 0.8–1.2)

## 2022-03-23 PROCEDURE — 85610 PROTHROMBIN TIME: CPT

## 2022-03-23 PROCEDURE — 36416 COLLJ CAPILLARY BLOOD SPEC: CPT

## 2022-03-23 PROCEDURE — 99211 OFF/OP EST MAY X REQ PHY/QHP: CPT

## 2022-03-23 NOTE — PROGRESS NOTES
Medication Management [address]   [phone] (phone)  [phone] (fax)    Mr. Thom Soler is a 73 y.o.  male with history of Afib who presents today for anticoagulation monitoring and adjustment. Patient verifies current dosing regimen and tablet strength. No missed or extra doses. Patient denies s/s bleeding/bruising/swelling/SOB/chest pain  No blood in urine or stool. No dietary changes. No changes in medication/OTC agents/Herbals. No change in alcohol use or tobacco use. No change in activity level. Patient denies headaches/dizziness/lightheadedness/falls. No vomiting/diarrhea or acute illness. No Procedures scheduled in the future at this time. Have previously been sending a fax of AVS to Dr. Sherri Quijano (neurology at [facility]), but pt says he doesn't follow with her and doesn't know who she is. (Although chart note said \"per pt request, fax copy. ..). Removing note in chart to fax AVS to Dr. Medina Desouza as of today. Assessment:   Lab Results   Component Value Date    INR 2.80 (H) 03/23/2022    INR 2.50 (H) 02/09/2022    INR 2.60 (H) 01/05/2022     INR therapeutic   Recent Labs     03/23/22  1023   INR 2.80*     Patient interview completed and discussed with pharmacist by Alessandra Mcrae PharmD Candidate  . Plan:  Continue Coumadin 5 mg MWF, 2.5 mg TThSaS. Recheck INR in 6 week(s). Patient reminded to call the Anticoagulation Clinic with any signs or symptoms of bleeding or with any medication changes. Patient given instructions utilizing the teach back method. After visit summary printed and reviewed with patient. Discharged ambulatory in no apparent distress.     For Pharmacy Admin Tracking Only     Time Spent (min): 20

## 2022-05-04 ENCOUNTER — HOSPITAL ENCOUNTER (OUTPATIENT)
Dept: PHARMACY | Age: 72
Setting detail: THERAPIES SERIES
Discharge: HOME | End: 2022-05-04
Payer: MEDICARE

## 2022-05-04 DIAGNOSIS — Z79.01 ANTICOAGULATED ON COUMADIN: ICD-10-CM

## 2022-05-04 DIAGNOSIS — Z51.81 ENCOUNTER FOR THERAPEUTIC DRUG MONITORING: ICD-10-CM

## 2022-05-04 DIAGNOSIS — I48.0 PAF (PAROXYSMAL ATRIAL FIBRILLATION) (HCC): Primary | ICD-10-CM

## 2022-05-04 LAB — POC INR: 3.1 (ref 0.8–1.2)

## 2022-05-04 PROCEDURE — 85610 PROTHROMBIN TIME: CPT

## 2022-05-04 PROCEDURE — 36416 COLLJ CAPILLARY BLOOD SPEC: CPT

## 2022-05-04 PROCEDURE — 99211 OFF/OP EST MAY X REQ PHY/QHP: CPT

## 2022-05-04 NOTE — PROGRESS NOTES
Medication Management [address]  [phone] (phone)  [phone] (fax)    Mr. Carter Thompson is a 73 y.o.  male with history of Afib who presents today for anticoagulation monitoring and adjustment. Patient verifies current dosing regimen and tablet strength. No missed or extra doses. Patient denies s/s bleeding/bruising/swelling/SOB/chest pain  No blood in urine or stool. No dietary changes. No changes in medication/OTC agents/Herbals. No change in alcohol use or tobacco use. No change in activity level. Patient denies headaches/dizziness/lightheadedness/falls. No vomiting/diarrhea or acute illness. No Procedures scheduled in the future at this time. Assessment:   Lab Results   Component Value Date    INR 3.10 (H) 2022    INR 2.80 (H) 2022    INR 2.50 (H) 2022     INR supratherapeutic   Recent Labs     22  1010   INR 3.10*         Plan:  Coumadin 2.5 mg today, then continue Coumadin 5 mg MWF, 2.5 mg all other days. Recheck INR in 4 week(s). Patient reminded to call the Anticoagulation Clinic with any signs or symptoms of bleeding or with any medication changes. Patient given instructions utilizing the teach back method. After visit summary printed and reviewed with patient. Discharged ambulatory in no apparent distress.     For Pharmacy Admin Tracking Only     Intervention Detail: Adherence Monitorin and Dose Adjustment: 1, reason: Therapy Optimization   Total # of Interventions Recommended: 2   Total # of Interventions Accepted: 2   Time Spent (min): [address], PharmD, BCPS  2022  10:19 AM

## 2022-06-01 ENCOUNTER — HOSPITAL ENCOUNTER (OUTPATIENT)
Dept: PHARMACY | Age: 72
Setting detail: THERAPIES SERIES
Discharge: HOME | End: 2022-06-01
Payer: MEDICARE

## 2022-06-01 DIAGNOSIS — I48.0 PAF (PAROXYSMAL ATRIAL FIBRILLATION) (HCC): Primary | ICD-10-CM

## 2022-06-01 DIAGNOSIS — Z79.01 ANTICOAGULATED ON COUMADIN: ICD-10-CM

## 2022-06-01 DIAGNOSIS — Z51.81 ENCOUNTER FOR THERAPEUTIC DRUG MONITORING: ICD-10-CM

## 2022-06-01 LAB — POC INR: 2.8 (ref 0.8–1.2)

## 2022-06-01 PROCEDURE — 99211 OFF/OP EST MAY X REQ PHY/QHP: CPT

## 2022-06-01 PROCEDURE — 36416 COLLJ CAPILLARY BLOOD SPEC: CPT

## 2022-06-01 PROCEDURE — 85610 PROTHROMBIN TIME: CPT

## 2022-06-01 NOTE — PROGRESS NOTES
Medication Management [address]   [phone] (phone)  [phone] (fax)    Mr. Herve Mayers is a 73 y.o.  male with history of Afib who presents today for anticoagulation monitoring and adjustment. Patient verifies current dosing regimen and tablet strength. No missed or extra doses. Patient denies s/s bleeding/bruising/swelling/SOB/chest pain  No blood in urine or stool. No dietary changes. No changes in medication/OTC agents/Herbals. No change in alcohol use or tobacco use. No change in activity level. Patient denies headaches/dizziness/lightheadedness/falls. No vomiting/diarrhea or acute illness. No Procedures scheduled in the future at this time. Assessment:   Lab Results   Component Value Date    INR 2.80 (H) 06/01/2022    INR 3.10 (H) 05/04/2022    INR 2.80 (H) 03/23/2022     INR therapeutic   Recent Labs     06/01/22  1016   INR 2.80*         Plan:  Continue Coumadin 5 mg MWF, 2.5 mg all other days. Recheck INR in 6 week(s). Patient reminded to call the Anticoagulation Clinic with any signs or symptoms of bleeding or with any medication changes. Patient given instructions utilizing the teach back method. After visit summary printed and reviewed with patient. Discharged ambulatory in no apparent distress.     For Pharmacy Admin Tracking Only   Time Spent (min): [address], PharmD, BCPS  6/1/2022  10:34 AM

## 2022-06-07 RX ORDER — TAMSULOSIN HYDROCHLORIDE 0.4 MG/1
CAPSULE ORAL
Qty: 180 CAPSULE | Refills: 3 | Status: SHIPPED | OUTPATIENT
Start: 2022-06-07

## 2022-07-13 ENCOUNTER — HOSPITAL ENCOUNTER (OUTPATIENT)
Dept: PHARMACY | Age: 72
Setting detail: THERAPIES SERIES
Discharge: HOME | End: 2022-07-13
Payer: MEDICARE

## 2022-07-13 DIAGNOSIS — I48.0 PAF (PAROXYSMAL ATRIAL FIBRILLATION) (HCC): Primary | ICD-10-CM

## 2022-07-13 DIAGNOSIS — Z51.81 ENCOUNTER FOR THERAPEUTIC DRUG MONITORING: ICD-10-CM

## 2022-07-13 DIAGNOSIS — Z79.01 ANTICOAGULATED ON COUMADIN: Primary | ICD-10-CM

## 2022-07-13 DIAGNOSIS — I48.0 PAF (PAROXYSMAL ATRIAL FIBRILLATION) (HCC): ICD-10-CM

## 2022-07-13 DIAGNOSIS — Z79.01 ANTICOAGULATED ON COUMADIN: ICD-10-CM

## 2022-07-13 LAB — POC INR: 3.7 (ref 0.8–1.2)

## 2022-07-13 PROCEDURE — 36416 COLLJ CAPILLARY BLOOD SPEC: CPT

## 2022-07-13 PROCEDURE — 99212 OFFICE O/P EST SF 10 MIN: CPT

## 2022-07-13 PROCEDURE — 85610 PROTHROMBIN TIME: CPT

## 2022-07-13 NOTE — PROGRESS NOTES
Medication Management [address]  [phone] (phone)  [phone] (fax)    Mr. Genesis Restrepo is a 73 y.o.  male with history of paroxysmal atrial fib. , per Dr. Jhonny Huffman referral, who presents today for Warfarin monitoring and adjustment (6 week visit). Patient verifies current dosing regimen and tablet strength. No missed or extra doses. Patient denies bleeding/bruising/SOB/chest pain. Has usual left leg swelling. No blood in urine or stool. No dietary changes. No changes in medication/OTC agents/herbals. No change in alcohol use or tobacco use. No change in activity level. Patient denies headaches/falls. Has usual slight intermittent dizziness. No vomiting/diarrhea or acute illness. No procedures scheduled in the future at this time. Assessment:     Lab Results   Component Value Date    INR 3.70 (H) 07/13/2022    INR 2.80 (H) 06/01/2022    INR 3.10 (H) 05/04/2022     INR supratherapeutic - goal 2-3. Recent Labs     07/13/22  1007   INR 3.70*     Plan:  POCT INR ordered/performed/result reviewed. Hold today, W, then decrease PO Coumadin to 5 mg MF, 2.5 mg TWThSS (from 5 mg MWF, 2.5 mg TThSS=10% decrease). Recheck INR in 3 week(s). (Report given - orders entered by SHELLEY Romero., PharmD.)  Patient reminded to call the Anticoagulation Clinic with any signs or symptoms of bleeding or with any medication changes. Patient given instructions utilizing the teach back method. Friend states no longer have to send reports to Dr. Lisseth Hamilton. Advised extra caution. After visit summary printed and reviewed with patient. Given orders for routine CBC/hepatic panel. Discharged ambulatory in no apparent distress, wearing mask, with male significant other.     For Pharmacy Admin Tracking Only     Intervention Detail: Dose Adjustment: 1, reason: Therapy De-escalation   Total # of Interventions Recommended: 1   Total # of Interventions Accepted: 1   Time

## 2022-07-27 ENCOUNTER — TELEPHONE (OUTPATIENT)
Dept: CARDIOLOGY CLINIC | Age: 72
End: 2022-07-27

## 2022-07-27 DIAGNOSIS — I48.91 ATRIAL FIBRILLATION, UNSPECIFIED TYPE (HCC): Primary | ICD-10-CM

## 2022-07-27 NOTE — TELEPHONE ENCOUNTER
----- Message from Trupti England RN sent at 7/27/2022  9:26 AM EDT -----  Please renew referral for Anticoagulation Monitoring, #[id number]. Thanks, L.  Shelley Diaz RN BSN

## 2022-08-01 LAB
A/G RATIO: NORMAL
ALBUMIN SERPL-MCNC: 4.2 G/DL
ALP BLD-CCNC: 127 U/L
ALT SERPL-CCNC: 51 U/L
AST SERPL-CCNC: 27 U/L
BASOPHILS ABSOLUTE: ABNORMAL
BASOPHILS RELATIVE PERCENT: ABNORMAL
BILIRUB SERPL-MCNC: 0.6 MG/DL (ref 0.1–1.4)
BILIRUBIN DIRECT: 0.3 MG/DL
BILIRUBIN, INDIRECT: NORMAL
EOSINOPHILS ABSOLUTE: ABNORMAL
EOSINOPHILS RELATIVE PERCENT: ABNORMAL
GLOBULIN: NORMAL
HCT VFR BLD CALC: 43.6 % (ref 41–53)
HEMOGLOBIN: 14.7 G/DL (ref 13.5–17.5)
LYMPHOCYTES ABSOLUTE: ABNORMAL
LYMPHOCYTES RELATIVE PERCENT: ABNORMAL
MCH RBC QN AUTO: ABNORMAL PG
MCHC RBC AUTO-ENTMCNC: ABNORMAL G/DL
MCV RBC AUTO: ABNORMAL FL
MONOCYTES ABSOLUTE: ABNORMAL
MONOCYTES RELATIVE PERCENT: ABNORMAL
NEUTROPHILS ABSOLUTE: ABNORMAL
NEUTROPHILS RELATIVE PERCENT: ABNORMAL
PLATELET # BLD: 189 K/ΜL
PMV BLD AUTO: ABNORMAL FL
PROTEIN TOTAL: 6 G/DL
RBC # BLD: 4.66 10^6/ΜL
WBC # BLD: 7.2 10^3/ML

## 2022-08-02 LAB — PSA, ULTRASENSITIVE: 0.22 NG/ML

## 2022-08-03 ENCOUNTER — HOSPITAL ENCOUNTER (OUTPATIENT)
Dept: PHARMACY | Age: 72
Setting detail: THERAPIES SERIES
Discharge: HOME | End: 2022-08-03
Payer: MEDICARE

## 2022-08-03 DIAGNOSIS — Z51.81 ENCOUNTER FOR THERAPEUTIC DRUG MONITORING: ICD-10-CM

## 2022-08-03 DIAGNOSIS — Z79.01 ANTICOAGULATED ON COUMADIN: ICD-10-CM

## 2022-08-03 DIAGNOSIS — I48.0 PAF (PAROXYSMAL ATRIAL FIBRILLATION) (HCC): Primary | ICD-10-CM

## 2022-08-03 LAB — POC INR: 3.3 (ref 0.8–1.2)

## 2022-08-03 PROCEDURE — 99212 OFFICE O/P EST SF 10 MIN: CPT

## 2022-08-03 PROCEDURE — 36416 COLLJ CAPILLARY BLOOD SPEC: CPT

## 2022-08-03 PROCEDURE — 85610 PROTHROMBIN TIME: CPT

## 2022-08-03 NOTE — PROGRESS NOTES
Medication Management [address]   [phone] (phone)  [phone] (fax)    Mr. Sylvia Carr is a 73 y.o.  male with history of atrial fib. , per Dr. Mel Mae referral, who presents today for Warfarin monitoring and adjustment (3 week visit after decreasing dose by 10%). Patient verifies current dosing regimen and tablet strength. No missed (except as ordered last visit) or extra doses. Patient denies bruising/swelling/chest pain. Has usual intermittent SOB. No blood in stool. Saw blood in urine approx. 7/14 and 7/15. States when urology office was called about it in past was told not to worry about it. Has appt. there 8/19. No dietary changes. No changes in medication/OTC agents/herbals. No change in alcohol use or tobacco use. No change in activity level. Patient denies headaches/falls. Was vague about whether dizziness was worse than usual; reminded to drink plenty of fluids in hot weather. No vomiting/diarrhea or acute illness. No procedures scheduled in the future at this time. Assessment:   Lab Results   Component Value Date    INR 3.30 (H) 08/03/2022    INR 3.70 (H) 07/13/2022    INR 2.80 (H) 06/01/2022     INR supratherapeutic - goal 2-3. Recent Labs     08/03/22  1001   INR 3.30*       Did routine CBC/hepatic panel 8/1. H&H:  14.7/43.6 (16/47.6 on 1/26/22 - both done at [facility]). 8/1 and 1/26 labs faxed to PCP ([facility] [address]) for review, with note regarding INR/blood in urine. States he's not sure who he sees now at [facility] [address]. Plan:  POCT INR ordered/performed/result reviewed. Hold today, W, then decrease PO Coumadin to 5 mg M, 2.5 mg TWThFSS (from 5 mg MF, 2.5 mg TWThSS=11.1% decrease). Recheck INR in 3 week(s). Mention to patient may need to change tablet strength in future - done.  (Report given - orders entered by Elle Calix., PharmD.)  Patient reminded to call the Anticoagulation Clinic with any signs or

## 2022-08-24 ENCOUNTER — HOSPITAL ENCOUNTER (OUTPATIENT)
Dept: PHARMACY | Age: 72
Setting detail: THERAPIES SERIES
Discharge: HOME | End: 2022-08-24
Payer: MEDICARE

## 2022-08-24 DIAGNOSIS — Z79.01 ANTICOAGULATED ON COUMADIN: ICD-10-CM

## 2022-08-24 DIAGNOSIS — Z51.81 ENCOUNTER FOR THERAPEUTIC DRUG MONITORING: ICD-10-CM

## 2022-08-24 DIAGNOSIS — I48.0 PAF (PAROXYSMAL ATRIAL FIBRILLATION) (HCC): Primary | ICD-10-CM

## 2022-08-24 LAB — POC INR: 2 (ref 0.8–1.2)

## 2022-08-24 PROCEDURE — 36416 COLLJ CAPILLARY BLOOD SPEC: CPT

## 2022-08-24 PROCEDURE — 85610 PROTHROMBIN TIME: CPT

## 2022-08-24 PROCEDURE — 99211 OFF/OP EST MAY X REQ PHY/QHP: CPT

## 2022-08-24 RX ORDER — ACETAMINOPHEN 500 MG
500 TABLET ORAL EVERY 6 HOURS PRN
COMMUNITY

## 2022-08-24 NOTE — PROGRESS NOTES
Medication Management [address]   [phone] (phone)  [phone] (fax)    Mr. Genesis Restrepo is a 73 y.o.  male with history of atrial fib. , per Dr. Jhonny Huffman referral, who presents today for Warfarin monitoring and adjustment (3 week visit after decreasing dose by 11.1% - second decrease in a row). Patient verifies current dosing regimen and tablet strength. No missed (except as ordered last visit) or extra doses. Patient denies bleeding/bruising. Has usual intermittent swelling of left leg - sometimes it's hard to walk. States sometimes his SOB is worse; has had more chest pain, and it lingers. Advised to notify doctor. No blood in urine or stool. No dietary changes. No changes in medication/OTC agents/herbals. No change in alcohol use or tobacco use. No change in activity level. Patient denies dizziness/lightheadedness/falls. Has usual headaches - sometimes takes Tylenol. Usually takes 1-2 Tylenol/day for aches and pains. No vomiting/diarrhea or acute illness. No procedures scheduled in the future at this time. Missed urology visit, but has rescheduled. Assessment:   Lab Results   Component Value Date    INR 2.00 (H) 08/24/2022    INR 3.30 (H) 08/03/2022    INR 3.70 (H) 07/13/2022     INR therapeutic - goal 2-3. Recent Labs     08/24/22  1008   INR 2.00*        Plan:  POCT INR ordered/performed/result reviewed. Continue PO Coumadin 5 mg M, 2.5 mg TWThFSS. Recheck INR in 4 week(s). (Report given - orders entered by Debby Muro [facility]., PharmD.)  Patient reminded to call the Anticoagulation Clinic with any signs or symptoms of bleeding or with any medication changes. Patient given instructions utilizing the teach back method. After visit summary printed and reviewed with patient. Discharged ambulatory in no apparent distress, wearing mask, with male significant other.     For Pharmacy Admin Tracking Only    Time Spent (min):  0.5

## 2022-09-22 ENCOUNTER — HOSPITAL ENCOUNTER (OUTPATIENT)
Dept: PHARMACY | Age: 72
Setting detail: THERAPIES SERIES
Discharge: HOME | End: 2022-09-22
Payer: MEDICARE

## 2022-09-22 DIAGNOSIS — Z51.81 ENCOUNTER FOR THERAPEUTIC DRUG MONITORING: ICD-10-CM

## 2022-09-22 DIAGNOSIS — Z79.01 ANTICOAGULATED ON COUMADIN: ICD-10-CM

## 2022-09-22 DIAGNOSIS — I48.0 PAF (PAROXYSMAL ATRIAL FIBRILLATION) (HCC): Primary | ICD-10-CM

## 2022-09-22 LAB — POC INR: 2.4 (ref 0.8–1.2)

## 2022-09-22 PROCEDURE — 99211 OFF/OP EST MAY X REQ PHY/QHP: CPT

## 2022-09-22 PROCEDURE — 85610 PROTHROMBIN TIME: CPT

## 2022-09-22 PROCEDURE — 36416 COLLJ CAPILLARY BLOOD SPEC: CPT

## 2022-09-30 ENCOUNTER — OFFICE VISIT (OUTPATIENT)
Dept: UROLOGY | Age: 72
End: 2022-09-30
Payer: MEDICARE

## 2022-09-30 VITALS
HEIGHT: 70 IN | SYSTOLIC BLOOD PRESSURE: 118 MMHG | DIASTOLIC BLOOD PRESSURE: 64 MMHG | WEIGHT: 238 LBS | BODY MASS INDEX: 34.07 KG/M2

## 2022-09-30 DIAGNOSIS — R33.8 BENIGN PROSTATIC HYPERPLASIA WITH URINARY RETENTION: ICD-10-CM

## 2022-09-30 DIAGNOSIS — R31.0 GROSS HEMATURIA: Primary | ICD-10-CM

## 2022-09-30 DIAGNOSIS — N40.1 BENIGN PROSTATIC HYPERPLASIA WITH URINARY RETENTION: ICD-10-CM

## 2022-09-30 LAB
BACTERIA: ABNORMAL
BILIRUBIN URINE: ABNORMAL
BILIRUBIN URINE: ABNORMAL
BLOOD URINE, POC: ABNORMAL
BLOOD, URINE: ABNORMAL
CASTS: ABNORMAL /LPF
CASTS: ABNORMAL /LPF
CHARACTER, URINE: ABNORMAL
CHARACTER, URINE: CLEAR
COLOR, URINE: YELLOW
COLOR: ABNORMAL
CRYSTALS: ABNORMAL
EPITHELIAL CELLS, UA: ABNORMAL /HPF
GLUCOSE URINE: NEGATIVE MG/DL
GLUCOSE, URINE: NEGATIVE MG/DL
ICTOTEST: POSITIVE
KETONES, URINE: ABNORMAL
KETONES, URINE: ABNORMAL
LEUKOCYTE CLUMPS, URINE: ABNORMAL
LEUKOCYTE ESTERASE, URINE: ABNORMAL
MISCELLANEOUS LAB TEST RESULT: ABNORMAL
NITRITE, URINE: NEGATIVE
NITRITE, URINE: POSITIVE
PH UA: 5.5 (ref 5–9)
PH, URINE: 6 (ref 5–9)
POST VOID RESIDUAL (PVR): 0 ML
PROTEIN UA: 30 MG/DL
PROTEIN, URINE: 30 MG/DL
RBC URINE: ABNORMAL /HPF
RENAL EPITHELIAL, UA: ABNORMAL
SPECIFIC GRAVITY UA: 1.02 (ref 1–1.03)
SPECIFIC GRAVITY, URINE: 1.02 (ref 1–1.03)
UROBILINOGEN, URINE: 1 EU/DL (ref 0–1)
UROBILINOGEN, URINE: 1 EU/DL (ref 0–1)
WBC UA: ABNORMAL /HPF
YEAST: ABNORMAL

## 2022-09-30 PROCEDURE — 99213 OFFICE O/P EST LOW 20 MIN: CPT | Performed by: NURSE PRACTITIONER

## 2022-09-30 PROCEDURE — 51798 US URINE CAPACITY MEASURE: CPT | Performed by: NURSE PRACTITIONER

## 2022-09-30 PROCEDURE — 81003 URINALYSIS AUTO W/O SCOPE: CPT | Performed by: NURSE PRACTITIONER

## 2022-09-30 PROCEDURE — 1123F ACP DISCUSS/DSCN MKR DOCD: CPT | Performed by: NURSE PRACTITIONER

## 2022-09-30 ASSESSMENT — ENCOUNTER SYMPTOMS
BACK PAIN: 0
VOMITING: 0
NAUSEA: 0
ABDOMINAL PAIN: 0

## 2022-09-30 NOTE — PROGRESS NOTES
[id number] [address] [place] [id number]  Dept: [phone]  Loc: [phone]    Visit Date: 9/30/2022        HPI:     Sam Qiu is a 81 y.o. male who presents today for:  Chief Complaint   Patient presents with    Follow-up    Hematuria     PSA PRIOR       HPI  Pt seen in follow up for BPH    Pt has a hx of BPH with urinary retention that improved on Flomax BID and finasteride 5 mg daily. Cystoscopy was performed 8/11/2020 by Dr. Maryan Dominguez with findings of moderate lateral lobe hypertrophy of the prostate and bladder diverticulum on the dome. CT urogram performed for micro hematuria noted bilateral nonobstructive nephrolithiasis and a hyperdense lesion at the superior pole of the right kidney measuring approximately 1.1 cms--? Possible hemorrhagic cyst. Subsequent renal us's have shown stability of bilateral renal cysts. Pt reports he is urinating well. Denies dysuria, hematuria.         Current Outpatient Medications   Medication Sig Dispense Refill    tamsulosin (FLOMAX) 0.4 mg capsule TAKE 1 CAPSULE BY MOUTH TWICE A  capsule 3    warfarin (COUMADIN) 5 MG tablet TAKE AS DIRECTED BY COUMADIN CLINIC 90 TABLETS= 90 DAYS 90 tablet 3    finasteride (PROSCAR) 5 MG tablet TAKE 1 TABLET BY MOUTH ONCE A DAY 30 tablet 11    metoprolol tartrate (LOPRESSOR) 25 MG tablet Take 1 tablet by mouth 2 times daily 90 tablet 3    dilTIAZem (CARDIZEM CD) 180 MG extended release capsule Take 1 capsule by mouth daily 90 capsule 2    albuterol sulfate HFA (PROVENTIL;VENTOLIN;PROAIR) 108 (90 Base) MCG/ACT inhaler Inhale 2 puffs into the lungs every 6 hours as needed for Wheezing      LINZESS 145 MCG capsule Take by mouth See Admin Instructions Indications: Chronic Constipation   4    aspirin 81 MG EC tablet Take 81 mg by mouth daily Indications: Treatment to Reduce Platelet Aggregation      sertraline (ZOLOFT) 50 MG tablet Take 50 mg by mouth daily       atorvastatin (LIPITOR) 40 MG tablet Take 1 tablet by mouth nightly. (Patient taking differently: Take 40 mg by mouth nightly Indications: Increase in the Amount of Cholesterol in the Blood) 30 tablet 0    acetaminophen (TYLENOL) 500 MG tablet Take 500 mg by mouth every 6 hours as needed for Pain Don't take more than 3,000 mg each day. (Patient not taking: Reported on 9/30/2022)       No current facility-administered medications for this visit. Past Medical History  Gracie Harrell  has a past medical history of Atrial fibrillation with rapid ventricular response ([address] Hypertension, Irregular heart beat, Stroke ([address] and Unspecified cerebral artery occlusion with cerebral infarction. Past Surgical History  The patient  has a past surgical history that includes transthoracic echocardiogram (08/23/2011); cardiovascular stress test (08/22/2011); Hydrocele surgery (Left, 12/6/13); Testicle surgery (2015); Hydrocele surgery (04/27/2017); lipoma resection (04/27/2017); Colonoscopy; and Colonoscopy (Left, 7/17/2018). Family History  This patient's family history includes Cancer in his father; Diabetes in his father and mother; Heart Disease in his mother. Social History  Gracie Harrell  reports that he has been smoking cigarettes. He has a 45.00 pack-year smoking history. He has never used smokeless tobacco. He reports that he does not currently use alcohol. He reports that he does not use drugs. Subjective:      Review of Systems   Constitutional:  Negative for activity change, appetite change, chills, diaphoresis, fatigue, fever and unexpected weight change. Gastrointestinal:  Negative for abdominal pain, nausea and vomiting. Genitourinary:  Negative for decreased urine volume, difficulty urinating, dysuria, flank pain, frequency, hematuria and urgency. Musculoskeletal:  Negative for back pain.      Objective:   /64   Ht 5' 10\" (1.778 m)   Wt 238 lb (108 kg)   BMI 34.15 kg/m² Physical Exam  Vitals reviewed. Constitutional:       General: He is not in acute distress. Appearance: Normal appearance. He is well-developed. He is not ill-appearing or diaphoretic. HENT:      Head: Normocephalic and atraumatic. Right Ear: External ear normal.      Left Ear: External ear normal.      Nose: Nose normal.      Mouth/Throat:      Mouth: Mucous membranes are moist.   Eyes:      General: No scleral icterus. Right eye: No discharge. Left eye: No discharge. Neck:      Vascular: No JVD. Trachea: No tracheal deviation. Pulmonary:      Effort: Pulmonary effort is normal. No respiratory distress. Abdominal:      General: There is no distension. Tenderness: There is no abdominal tenderness. There is no right CVA tenderness or left CVA tenderness. Musculoskeletal:         General: Normal range of motion. Neurological:      Mental Status: He is alert and oriented to person, place, and time. Mental status is at baseline. Psychiatric:         Mood and Affect: Mood normal.         Behavior: Behavior normal.         Thought Content:  Thought content normal.       POC  Results for POC orders placed in visit on 09/30/22   POCT Urinalysis No Micro (Auto)   Result Value Ref Range    Glucose, Ur Negative NEGATIVE mg/dl    Bilirubin Urine Small (A)     Ketones, Urine Trace (A) NEGATIVE    Specific Gravity, Urine 1.025 1.002 - 1.030    Blood, UA POC Trace-intact NEGATIVE    pH, Urine 6.00 5.0 - 9.0    Protein, Urine 30 (A) NEGATIVE mg/dl    Urobilinogen, Urine 1.00 0.0 - 1.0 eu/dl    Nitrite, Urine Negative NEGATIVE    Leukocyte Clumps, Urine Small (A) NEGATIVE    Color, Urine Yellow YELLOW-STRAW    Character, Urine Clear [id number]   poct post void residual   Result Value Ref Range    post void residual 0 ml       Patients recent PSA values are as follows  Lab Results   Component Value Date    PSA 1.34 (H) 03/04/2020        Recent BUN/Creatinine:  Lab Results Component Value Date/Time    BUN 15 11/18/2019 10:42 AM    CREATININE 0.8 11/18/2019 10:42 AM       Assessment:   BPH with LUTs  Bilateral renal cysts  Bilateral nonobstructive nephrolithiasis  Scrotal sebaceous cyst  Current tobacco user  Hx CVA    Plan:     Pt doing well. No issues with urination. Continue finasteride and flomax. PSA 0.22 8/1/22.       F/u in 1 year with PVR

## 2022-10-02 LAB
ORGANISM: ABNORMAL
URINE CULTURE, ROUTINE: ABNORMAL

## 2022-10-03 ENCOUNTER — TELEPHONE (OUTPATIENT)
Dept: UROLOGY | Age: 72
End: 2022-10-03

## 2022-10-03 NOTE — TELEPHONE ENCOUNTER
Attempted to call the patient,message left for Kavya Read on [facility] Who takes his phone calls and will return the call to the office.

## 2022-10-03 NOTE — TELEPHONE ENCOUNTER
Pt's urine culture with bacteria. If not having any symptoms recommend holding off on antibiotic therapy at this time. However if he has developed symptoms since being seen in the office we can start a course of antibiotics.

## 2022-10-04 NOTE — TELEPHONE ENCOUNTER
Cammy Xiong will contact the patient sister Lissy Nolen) and have her call back if he needs to be started on antibiotics.

## 2022-10-13 ENCOUNTER — OFFICE VISIT (OUTPATIENT)
Dept: CARDIOLOGY CLINIC | Age: 72
End: 2022-10-13
Payer: MEDICARE

## 2022-10-13 VITALS
SYSTOLIC BLOOD PRESSURE: 147 MMHG | BODY MASS INDEX: 34.5 KG/M2 | HEIGHT: 70 IN | WEIGHT: 241 LBS | DIASTOLIC BLOOD PRESSURE: 73 MMHG | HEART RATE: 73 BPM

## 2022-10-13 DIAGNOSIS — I48.91 ATRIAL FIBRILLATION, UNSPECIFIED TYPE (HCC): Primary | ICD-10-CM

## 2022-10-13 PROCEDURE — 93000 ELECTROCARDIOGRAM COMPLETE: CPT | Performed by: INTERNAL MEDICINE

## 2022-10-13 PROCEDURE — 1123F ACP DISCUSS/DSCN MKR DOCD: CPT | Performed by: INTERNAL MEDICINE

## 2022-10-13 PROCEDURE — 99214 OFFICE O/P EST MOD 30 MIN: CPT | Performed by: INTERNAL MEDICINE

## 2022-10-13 NOTE — PROGRESS NOTES
[address] [facility] [address] [facility] [address]  Dept: [phone]  Dept Fax: [phone]  Loc: [phone]      Visit Date: 10/13/2022    Mr. Xander Palm is a 81 y.o. male  who presented for:  Chief Complaint   Patient presents with    Check-Up    Atrial Fibrillation       HPI:   82 yo M c hx of Afib on coumadin, CVA, HTN is here for 3 month follow up afib, HTN and heart cath. Patient currently smokes, 1 ppd. Doing well. Denies any chest pain, sob, palpitations, lightheadedness, dizziness, orthopnea, PND or pedal edema. Current Outpatient Medications:     acetaminophen (TYLENOL) 500 MG tablet, Take 500 mg by mouth every 6 hours as needed for Pain Don't take more than 3,000 mg each day., Disp: , Rfl:     tamsulosin (FLOMAX) 0.4 mg capsule, TAKE 1 CAPSULE BY MOUTH TWICE A DAY, Disp: 180 capsule, Rfl: 3    warfarin (COUMADIN) 5 MG tablet, TAKE AS DIRECTED BY COUMADIN CLINIC 90 TABLETS= 90 DAYS, Disp: 90 tablet, Rfl: 3    finasteride (PROSCAR) 5 MG tablet, TAKE 1 TABLET BY MOUTH ONCE A DAY, Disp: 30 tablet, Rfl: 11    dilTIAZem (CARDIZEM CD) 180 MG extended release capsule, Take 1 capsule by mouth daily, Disp: 90 capsule, Rfl: 2    albuterol sulfate HFA (PROVENTIL;VENTOLIN;PROAIR) 108 (90 Base) MCG/ACT inhaler, Inhale 2 puffs into the lungs every 6 hours as needed for Wheezing, Disp: , Rfl:     LINZESS 145 MCG capsule, Take by mouth See Admin Instructions Indications: Chronic Constipation , Disp: , Rfl: 4    aspirin 81 MG EC tablet, Take 81 mg by mouth daily Indications: Treatment to Reduce Platelet Aggregation, Disp: , Rfl:     sertraline (ZOLOFT) 50 MG tablet, Take 50 mg by mouth daily , Disp: , Rfl:     atorvastatin (LIPITOR) 40 MG tablet, Take 1 tablet by mouth nightly.  (Patient taking differently: Take 40 mg by mouth nightly Indications: Increase in the Amount of Cholesterol in the Blood), Disp: 30 tablet, Rfl: 0    Past Medical History  Joe Guevara  has a past medical history of Atrial fibrillation with rapid ventricular response ([address] Hypertension, Irregular heart beat, Stroke ([address] and Unspecified cerebral artery occlusion with cerebral infarction. Social History  Joe Guevara  reports that he has been smoking cigarettes. He has a 45.00 pack-year smoking history. He has never used smokeless tobacco. He reports that he does not currently use alcohol. He reports that he does not use drugs. Family History  Joe Guevara family history includes Cancer in his father; Diabetes in his father and mother; Heart Disease in his mother. Past Surgical History   Past Surgical History:   Procedure Laterality Date    CARDIOVASCULAR STRESS TEST  08/22/2011    EF 43% SUGGESTS ECHO    COLONOSCOPY      COLONOSCOPY Left 7/17/2018    COLONOSCOPY WITH BIOPSY performed by Cee Moreno MD at [facility] Left 12/6/13    Left Scrotal Exploration    HYDROCELE EXCISION  04/27/2017    LIPOMA RESECTION  04/27/2017    right shoulder lipoma resection    TESTICLE SURGERY  2015    TRANSTHORACIC ECHOCARDIOGRAM  08/23/2011    EF 50-55% A FIB C RVR,CONTROLLED HTN/STUDY WAS WITHIN NORMAL LIMITS       Subjective:     REVIEW OF SYSTEMS  Constitutional: denies sweats, chills and fever  HENT: denies  congestion, sinus pressure, sneezing and sore throat. Eyes: denies  pain, discharge, redness and itching. Respiratory: denies apnea, cough  Gastrointestinal: denies blood in stool, constipation, diarrhea   Endocrine: denies cold intolerance, heat intolerance, polydipsia. Genitourinary: denies dysuria, enuresis, flank pain and hematuria. Musculoskeletal: denies arthralgias, joint swelling and neck pain. Neurological: denies numbness and headaches. Psychiatric/Behavioral: denies agitation, confusion, decreased concentration and dysphoric mood    All others reviewed and are negative.    Objective:     BP (!) 147/73   Pulse 73   Ht 5' 10\" (1.778 m) Wt 241 lb (109.3 kg)   BMI 34.58 kg/m²     Wt Readings from Last 3 Encounters:   10/13/22 241 lb (109.3 kg)   09/30/22 238 lb (108 kg)   10/21/21 244 lb (110.7 kg)     BP Readings from Last 3 Encounters:   10/13/22 (!) 147/73   09/30/22 118/64   10/21/21 112/64       PHYSICAL EXAM  Constitutional: Oriented to person, place, and time. Appears well-developed and well-nourished. HENT:   Head: Normocephalic and atraumatic. Eyes: EOM are normal. Pupils are equal, round, and reactive to light. Neck: Normal range of motion. Neck supple. No JVD present. Cardiovascular: Normal rate ,irregular normal heart sounds and intact distal pulses. Pulmonary/Chest: Effort normal and breath sounds normal. No respiratory distress. No wheezes. No rales. Abdominal: Soft. Bowel sounds are normal. No distension. There is no tenderness. Musculoskeletal: Normal range of motion. No edema. Neurological: Alert and oriented to person, place, and time. No cranial nerve deficit. Coordination normal.   Skin: Skin is warm and dry. Psychiatric: Normal mood and affect.        Lab Results   Component Value Date/Time    CKTOTAL 65 07/10/2012 10:00 AM    CKTOTAL 39 08/23/2011 05:22 AM    CKTOTAL 39 08/22/2011 08:37 PM       Lab Results   Component Value Date/Time    WBC 7.2 08/01/2022 12:00 AM    RBC 4.66 08/01/2022 12:00 AM     09/17/2020 02:45 PM    HGB 14.7 08/01/2022 12:00 AM    HCT 43.6 08/01/2022 12:00 AM    MCV 98.3 07/14/2021 10:38 AM    MCH 31.6 07/14/2021 10:38 AM    MCHC 32.1 07/14/2021 10:38 AM    RDW 14.8 04/18/2017 11:30 AM     08/01/2022 12:00 AM    MPV 10.3 07/14/2021 10:38 AM       Lab Results   Component Value Date/Time     11/18/2019 10:42 AM    K 5.4 11/18/2019 10:42 AM    K 4.2 08/02/2019 09:14 AM     11/18/2019 10:42 AM    CO2 24 11/18/2019 10:42 AM    BUN 15 11/18/2019 10:42 AM    LABALBU 4.2 08/01/2022 12:00 AM    LABALBU 4.0 08/23/2011 05:22 AM    CREATININE 0.8 11/18/2019 10:42 AM CALCIUM 9.6 11/18/2019 10:42 AM    LABGLOM >90 11/18/2019 10:42 AM    GLUCOSE Negative 09/17/2020 02:45 PM    GLUCOSE 88 08/23/2011 05:22 AM       Lab Results   Component Value Date/Time    ALKPHOS 127 08/01/2022 12:00 AM    ALT 51 08/01/2022 12:00 AM    AST 27 08/01/2022 12:00 AM    PROT 6.0 08/01/2022 12:00 AM    BILITOT 0.6 08/01/2022 12:00 AM    BILITOT Negative 09/17/2020 02:45 PM    BILIDIR 0.3 08/01/2022 12:00 AM    LABALBU 4.2 08/01/2022 12:00 AM    LABALBU 4.0 08/23/2011 05:22 AM       Lab Results   Component Value Date/Time    MG 2.3 07/16/2012 06:17 AM       Lab Results   Component Value Date    INR 2.40 (H) 09/22/2022    INR 2.00 (H) 08/24/2022    INR 3.30 (H) 08/03/2022         Lab Results   Component Value Date/Time    LABA1C 6.1 08/23/2011 05:22 AM       Lab Results   Component Value Date/Time    TRIG 80 07/14/2021 10:38 AM    HDL 61 07/14/2021 10:38 AM    LDLCALC 39 07/14/2021 10:38 AM       Lab Results   Component Value Date/Time    TSH 1.570 11/18/2019 10:42 AM         Testing Reviewed:      I have individually reviewed the below cardiac tests    EKG:  Afib rate controlled    ECHO:   Results for orders placed during the hospital encounter of 03/28/17   ECHO Complete 2D W Doppler W Color    Narrative Transthoracic Echocardiography Report (TTE)     Demographics      Patient Name    Melisa Shipley Gender                Male      MR #            [id number]     Race                                                      Ethnicity      Account #       [de-identified]       Room Number      Accession       [id number]     Date of Study         03/28/2017   Number      Date of Birth   1950    Referring Physician   Rogelio Blas      Age             61 year(s)    Minerva Briones 1723, CS                                    Interpreting          Merissa Reed DO                                 Physician Procedure    Type of Study      TTE procedure:ECHOCARDIOGRAM COMPLETE 2D W DOPPLER W COLOR. Procedure Date  Date: 03/28/2017 Start: 09:12 AM    Study Location: Echo Lab  Technical Quality: Adequate visualization    Indications:Abnormal ECG. Additional Medical History:Smoker, Hypertension, Atrial fibrillation, CVA,  Chest Pain. Patient Status: Routine    Height: 71 inches Weight: 224 pounds BSA: 2.21 m^2 BMI: 31.24 kg/m^2    BP: 146/74 mmHg     Conclusions      Summary   Systolic function was normal.   Ejection fraction is visually estimated at 55%. Mildly dilated right ventricle. Right ventricle global systolic function is mildly reduced . Signature      ----------------------------------------------------------------   Electronically signed by Evans Mcburney DO (Interpreting   physician) on 03/28/2017 at 06:54 PM   ----------------------------------------------------------------      Findings      Mitral Valve   Mild calcification of the posterior leaflet of the mitral valve. Trace mitral regurgitation is present. Aortic Valve   The aortic valve leaflets were not well visualized. The aortic valve appears to be trileaflet with good leaflet separation. Tricuspid Valve   Tricuspid valve is structurally normal.   Trivial to mild tricuspid regurgitation visualized. Pulmonic Valve   Pulmonic valve is structurally normal.   Trivial pulmonic regurgitation visualized. Left Atrium   Mildly dilated left atrium. Left Ventricle   Systolic function was normal.   Ejection fraction is visually estimated at 55%. Right Atrium   The right atrium is of normal size. Right Ventricle   Mildly dilated right ventricle. Right ventricle global systolic function is mildly reduced . Pericardial Effusion   There is a trivial pericardial effusion noted.      M-Mode/2D Measurements & Calculations      LV Diastolic    LV Systolic Dimension:    AV Cusp Separation: 2 cmLA   Dimension: 4.7  3.5 cm                    Dimension: 4.3 cmAO Root   cm              LV Volume Diastolic: 017  Dimension: 3.6 cmLA Area: 29.3   LV FS:25.5 %    ml                        cm^2   LV PW           LV Volume Systolic: 13.7   Diastolic: 1.3  ml   cm              LV EDV/LV EDV Index: 102   Septum          ml/46 m^2LV ESV/LV ESV    RV Diastolic Dimension: 4.5 cm   Diastolic: 1.3  Index: 16.5 ml/23 m^2   cm              EF Calculated: 50.1 %     LA/Aorta: 1.19                                                LA volume/Index: 106.5 ml /48m^2     Doppler Measurements & Calculations      MV Peak E-Wave: 73.5     AV Peak Velocity: 139  LVOT Peak Velocity: 94.3   cm/s                     cm/s                   cm/s                            AV Peak Gradient: 7.73 LVOT Peak Gradient: 4 mmHg   MV Peak Gradient: 2.16   mmHg   mmHg                                            TV Peak E-Wave: 70.1 cm/s      MV Deceleration Time:                           TV Peak Gradient: 1.97   208 msec                                        mmHg                            IVRT: 88 msec          TR Velocity:270 cm/s                                                   TR Gradient:29.16 mmHg                                                   PV Peak Velocity: 70.1                            AV DVI (Vmax):0.68     cm/s                                                   PV Peak Gradient: 1.97                                                   mmHg     [email or web address] [id number]       STRESS:    CATH:    Assessment/Plan       Diagnosis Orders   1.  Atrial fibrillation, unspecified type ([address]  EKG 12 lead            Afib on coumadin  CVA  HTN  Smoker  claudication    States no significant symptoms   Smokes 1ppd  Family says he sleeps a lot   Will d/c metoprolol and monitor symptoms of fatigue  Advised to stop smoking because smoking increases risk of heart disease, morbidity, mortality and end organ damage. Heart Cath August 2019 showed patent coronaries   Patient states that he takes all his medications regularly  On coumadin and goes to coumadin clinic  No bleeding issues, understands bleeding risks  The patient is asked to make an attempt to improve diet and exercise patterns to aid in medical management of this problem. Advised more plant based nutrition/meditarrean diet   Advised patient to call office or seek immediate medical attention if there is any new onset of  any chest pain, sob, palpitations, lightheadedness, dizziness, orthopnea, PND or pedal edema. All medication side effects were discussed in details. Thank you for allowing me to participate in the care of this patient. Please do not hesitate to contact me for any further questions. Return in about 1 year (around 10/13/2023), or if symptoms worsen or fail to improve, for Regular follow up, Review testing.        Electronically signed by Daiana Paniagua MD [facility]  10/13/2022 at 11:18 AM

## 2022-10-20 ENCOUNTER — HOSPITAL ENCOUNTER (OUTPATIENT)
Dept: PHARMACY | Age: 72
Setting detail: THERAPIES SERIES
Discharge: HOME | End: 2022-10-20
Payer: MEDICARE

## 2022-10-20 DIAGNOSIS — I48.0 PAF (PAROXYSMAL ATRIAL FIBRILLATION) (HCC): Primary | ICD-10-CM

## 2022-10-20 DIAGNOSIS — Z79.01 ANTICOAGULATED ON COUMADIN: ICD-10-CM

## 2022-10-20 DIAGNOSIS — Z51.81 ENCOUNTER FOR THERAPEUTIC DRUG MONITORING: ICD-10-CM

## 2022-10-20 LAB — POC INR: 2.1 (ref 0.8–1.2)

## 2022-10-20 PROCEDURE — 99211 OFF/OP EST MAY X REQ PHY/QHP: CPT | Performed by: PHARMACIST

## 2022-10-20 PROCEDURE — 36416 COLLJ CAPILLARY BLOOD SPEC: CPT | Performed by: PHARMACIST

## 2022-10-20 PROCEDURE — 85610 PROTHROMBIN TIME: CPT | Performed by: PHARMACIST

## 2022-10-20 NOTE — PROGRESS NOTES
Medication Management [address]  [phone] (phone)  [phone] (fax)    Mr. Dwaine Stone is a 81 y.o.  male with history of Afib who presents today for anticoagulation monitoring and adjustment. Patient verifies current dosing regimen and tablet strength. No missed or extra doses. Patient denies s/s bleeding/bruising/swelling/SOB/chest pain  No blood in urine or stool. No dietary changes. No changes in OTC agents/Herbals. Metoprolol stopped. No change in alcohol use or tobacco use. No change in activity level. Patient denies headaches/dizziness/lightheadedness/falls. No vomiting/diarrhea or acute illness. No Procedures scheduled in the future at this time. Assessment:   Lab Results   Component Value Date    INR 2.10 (H) 10/20/2022    INR 2.40 (H) 09/22/2022    INR 2.00 (H) 08/24/2022     INR therapeutic   Recent Labs     10/20/22  1026   INR 2.10*         Plan:  Continue Coumadin 5mg M and 2.5mg all other days. Recheck INR in 4 week(s). Patient reminded to call the Anticoagulation Clinic with any signs or symptoms of bleeding or with any medication changes. Patient given instructions utilizing the teach back method. After visit summary printed and reviewed with patient. Discharged ambulatory in no apparent distress.       For Pharmacy Admin Tracking Only  Time Spent (min): 20

## 2022-11-15 ENCOUNTER — HOSPITAL ENCOUNTER (OUTPATIENT)
Dept: PHARMACY | Age: 72
Setting detail: THERAPIES SERIES
Discharge: HOME | End: 2022-11-15
Payer: MEDICARE

## 2022-11-15 DIAGNOSIS — Z79.01 ANTICOAGULATED ON COUMADIN: ICD-10-CM

## 2022-11-15 DIAGNOSIS — I48.0 PAF (PAROXYSMAL ATRIAL FIBRILLATION) (HCC): Primary | ICD-10-CM

## 2022-11-15 DIAGNOSIS — Z51.81 ENCOUNTER FOR THERAPEUTIC DRUG MONITORING: ICD-10-CM

## 2022-11-15 LAB — POC INR: 2 (ref 0.8–1.2)

## 2022-11-15 PROCEDURE — 99211 OFF/OP EST MAY X REQ PHY/QHP: CPT

## 2022-11-15 PROCEDURE — 85610 PROTHROMBIN TIME: CPT

## 2022-11-15 PROCEDURE — 36416 COLLJ CAPILLARY BLOOD SPEC: CPT

## 2022-11-15 NOTE — PROGRESS NOTES
Medication Management [address]   [phone] (phone)  [phone] (fax)    Mr. Kelvin Pereyra is a 81 y.o.  male with history of atrial fib. , per Dr. Kendall Mirza referral, who presents today for Warfarin monitoring and adjustment (4 week visit). Patient verifies current dosing regimen and tablet strength. No missed or extra doses. Patient denies bleeding/bruising/chest pain. Has usual SOB/swelling of feet and legs. No blood in urine or stool. No dietary changes. No changes in medication/OTC agents/herbals. No change in alcohol use or tobacco use. No change in activity level. Had more stress. Patient denies falls. Takes nothing for usual minor headaches. Has usual slight dizziness. No vomiting/diarrhea or acute illness. No procedures scheduled in the future at this time. Assessment:   Lab Results   Component Value Date    INR 2.00 (H) 11/15/2022    INR 2.10 (H) 10/20/2022    INR 2.40 (H) 09/22/2022     INR therapeutic - goal 2-3. Recent Labs     11/15/22  1014   INR 2.00*        Plan:  POCT INR ordered/performed/result reviewed. Continue PO Coumadin 5 mg M, 2.5 mg TWThFSS. Recheck INR in 4 week(s). Patient reminded to call the Anticoagulation Clinic with any signs or symptoms of bleeding or with any medication changes. Patient given instructions utilizing the teach back method. After visit summary printed and reviewed with patient. Discharged ambulatory in no apparent distress, with male significant other - both wearing masks.

## 2022-12-05 RX ORDER — FINASTERIDE 5 MG/1
TABLET, FILM COATED ORAL
Qty: 30 TABLET | Refills: 11 | Status: SHIPPED | OUTPATIENT
Start: 2022-12-05

## 2022-12-05 NOTE — TELEPHONE ENCOUNTER
Amy Fernando called requesting a refill on the following medications:  Requested Prescriptions     Pending Prescriptions Disp Refills    finasteride (PROSCAR) 5 MG tablet [Pharmacy Med Name: finasteride 5 mg tablet] 30 tablet 11     Sig: TAKE 1 TABLET BY MOUTH ONCE A DAY     Pharmacy verified:  .[email or web address]      Date of last visit: 09/30/2022  Date of next visit (if applicable): 94/31/8107

## 2022-12-07 DIAGNOSIS — I48.0 PAF (PAROXYSMAL ATRIAL FIBRILLATION) (HCC): ICD-10-CM

## 2022-12-07 RX ORDER — WARFARIN SODIUM 5 MG/1
TABLET ORAL
Qty: 90 TABLET | Refills: 3 | OUTPATIENT
Start: 2022-12-07

## 2022-12-07 RX ORDER — WARFARIN SODIUM 5 MG/1
TABLET ORAL
Qty: 60 TABLET | Refills: 0 | Status: SHIPPED | OUTPATIENT
Start: 2022-12-07

## 2022-12-14 ENCOUNTER — HOSPITAL ENCOUNTER (OUTPATIENT)
Dept: PHARMACY | Age: 72
Setting detail: THERAPIES SERIES
Discharge: HOME | End: 2022-12-14
Payer: MEDICARE

## 2022-12-14 DIAGNOSIS — I48.0 PAF (PAROXYSMAL ATRIAL FIBRILLATION) (HCC): Primary | ICD-10-CM

## 2022-12-14 DIAGNOSIS — Z51.81 ENCOUNTER FOR THERAPEUTIC DRUG MONITORING: ICD-10-CM

## 2022-12-14 DIAGNOSIS — Z79.01 ANTICOAGULATED ON COUMADIN: ICD-10-CM

## 2022-12-14 LAB — POC INR: 2.2 (ref 0.8–1.2)

## 2022-12-14 PROCEDURE — 85610 PROTHROMBIN TIME: CPT

## 2022-12-14 PROCEDURE — 36416 COLLJ CAPILLARY BLOOD SPEC: CPT

## 2022-12-14 PROCEDURE — 99211 OFF/OP EST MAY X REQ PHY/QHP: CPT

## 2022-12-14 NOTE — PROGRESS NOTES
Medication Management [address]   [phone] (phone)  [phone] (fax)    Mr. Sean Garcia is a 81 y.o.  male with history of atrial fib. , per Dr. Eliz Bowens referral, who presents today for Warfarin monitoring and adjustment (4 week visit). Patient verifies current dosing regimen and tablet strength. No missed or extra doses. Patient denies bruising/swelling/chest pain. Has usual SOB. No blood in stool. Occasionally sees blood in urine - none recently. States PCP knows. No dietary changes. No changes in medication/OTC agents/herbals. No change in alcohol use or tobacco use. No change in activity level. Patient denies headaches/dizziness/lightheadedness/falls. No vomiting/diarrhea or acute illness. No procedures scheduled in the future at this time. Assessment:   Lab Results   Component Value Date    INR 2.20 (H) 12/14/2022    INR 2.00 (H) 11/15/2022    INR 2.10 (H) 10/20/2022     INR therapeutic - goal 2-3. Recent Labs     12/14/22  1014   INR 2.20*        Plan:  POCT INR ordered/performed/result reviewed. Continue PO Coumadin 5 mg M, 2.5 mg TWThFSS. Recheck INR in 5 week(s). Patient reminded to call the Anticoagulation Clinic with any signs or symptoms of bleeding or with any medication changes. Patient given instructions utilizing the teach back method. After visit summary printed and reviewed with patient. Discharged ambulatory in no apparent distress, wearing mask, with male significant other.

## 2023-01-18 ENCOUNTER — HOSPITAL ENCOUNTER (OUTPATIENT)
Dept: PHARMACY | Age: 73
Setting detail: THERAPIES SERIES
Discharge: HOME | End: 2023-01-18
Payer: MEDICARE

## 2023-01-18 DIAGNOSIS — Z79.01 ANTICOAGULATED ON COUMADIN: ICD-10-CM

## 2023-01-18 DIAGNOSIS — I48.0 PAF (PAROXYSMAL ATRIAL FIBRILLATION) (HCC): Primary | ICD-10-CM

## 2023-01-18 DIAGNOSIS — Z51.81 ENCOUNTER FOR THERAPEUTIC DRUG MONITORING: ICD-10-CM

## 2023-01-18 LAB — POC INR: 1.7 (ref 0.8–1.2)

## 2023-01-18 PROCEDURE — 85610 PROTHROMBIN TIME: CPT

## 2023-01-18 PROCEDURE — 99212 OFFICE O/P EST SF 10 MIN: CPT

## 2023-01-18 PROCEDURE — 36416 COLLJ CAPILLARY BLOOD SPEC: CPT

## 2023-01-18 NOTE — PROGRESS NOTES
Medication Management [address]  [phone] (phone)  [phone] (fax)    Mr. Wale Delgadillo is a 81 y.o.  male with history of atrial fib. , per Dr. Romulo Rainey referral, who presents today for Warfarin monitoring and adjustment (5 week visit). Patient verifies current dosing regimen and tablet strength. No missed or extra doses. Patient denies bleeding/bruising/SOB/chest pain. Has usual intermittent swelling of legs. No blood in urine or stool. No dietary changes. No changes in medication/OTC agents/herbals. Had pharmacy sheets from his meds. with him. No change in alcohol use or tobacco use. No change in activity level. Patient denies headaches/dizziness/lightheadedness/falls. No vomiting/diarrhea or acute illness. No procedures scheduled in the future at this time. Assessment:   Lab Results   Component Value Date    INR 1.70 (H) 01/18/2023    INR 2.20 (H) 12/14/2022    INR 2.00 (H) 11/15/2022     INR subtherapeutic - goal 2-3. Recent Labs     01/18/23  1008   INR 1.70*        Plan:  POCT INR performed/result reviewed. 5 mg today, W, then continue PO Coumadin 5 mg M, 2.5 mg TWThFSS. Recheck INR in 3 week(s). (Report given - orders entered by Zeinab Vaca [facility] PharmD.)  Patient reminded to call the Anticoagulation Clinic with any signs or symptoms of bleeding or with any medication changes. Patient given instructions utilizing the teach back method. After visit summary printed and reviewed with patient. Discharged ambulatory in no apparent distress, with male significant other.     For Pharmacy Admin Tracking Only    Intervention Detail: Dose Adjustment: 1, reason: Therapy Optimization  Total # of Interventions Recommended: 1  Total # of Interventions Accepted: 1  Time Spent (min):  0.75

## 2023-02-08 ENCOUNTER — HOSPITAL ENCOUNTER (OUTPATIENT)
Dept: PHARMACY | Age: 73
Setting detail: THERAPIES SERIES
Discharge: HOME | End: 2023-02-08
Payer: MEDICARE

## 2023-02-08 DIAGNOSIS — Z79.01 ANTICOAGULATED ON COUMADIN: ICD-10-CM

## 2023-02-08 DIAGNOSIS — Z51.81 ENCOUNTER FOR THERAPEUTIC DRUG MONITORING: ICD-10-CM

## 2023-02-08 DIAGNOSIS — I48.0 PAF (PAROXYSMAL ATRIAL FIBRILLATION) (HCC): Primary | ICD-10-CM

## 2023-02-08 LAB — POC INR: 1.8 (ref 0.8–1.2)

## 2023-02-08 PROCEDURE — 36416 COLLJ CAPILLARY BLOOD SPEC: CPT

## 2023-02-08 PROCEDURE — 99211 OFF/OP EST MAY X REQ PHY/QHP: CPT

## 2023-02-08 PROCEDURE — 85610 PROTHROMBIN TIME: CPT

## 2023-02-08 NOTE — PROGRESS NOTES
Medication Management [address]   [phone] (phone)  [phone] (fax)    Mr. Estrella Montejo is a 81 y.o.  male with history of atrial fib. , per Dr. Ritu Ortez referral, who presents today for Warfarin monitoring and adjustment (3 week visit). Patient verifies current dosing regimen and tablet strength. No missed or extra doses, except for bolus ordered last visit. Patient denies bleeding/bruising/chest pain. Has usual SOB/intermittent swelling of legs. No blood in urine or stool. No dietary changes. No changes in medication/OTC agents/herbals. No change in alcohol use or tobacco use. No change in activity level. Patient denies headaches/dizziness/lightheadedness/falls. No vomiting/diarrhea or acute illness. No procedures scheduled in the future at this time. Assessment:   Lab Results   Component Value Date    INR 1.80 (H) 02/08/2023    INR 1.70 (H) 01/18/2023    INR 2.20 (H) 12/14/2022     INR subtherapeutic - goal 2-3. Recent Labs     02/08/23  1008   INR 1.80*        Plan:  POCT INR performed/result reviewed. 5 mg today, W, 2.5 mg this Friday, then increase PO Coumadin to 5 mg MF, 2.5 mg TWThSS (from 5 mg M, 2.5 mg TWThFSS=12.5% increase). Recheck INR in 2 week(s). (Report given - orders entered by Shelby Khoury [facility] PharmD.) Patient reminded to call the Anticoagulation Clinic with any signs or symptoms of bleeding or with any medication changes. Patient given instructions utilizing the teach back method. After visit summary printed and reviewed with patient. Discharged ambulatory in no apparent distress, wearing mask, with male significant other (also wearing mask).     For Pharmacy Admin Tracking Only    Intervention Detail: Dose Adjustment: 1, reason: Therapy Optimization  Total # of Interventions Recommended: 1  Total # of Interventions Accepted: 1  Time Spent (min):  22

## 2023-02-22 ENCOUNTER — HOSPITAL ENCOUNTER (OUTPATIENT)
Dept: PHARMACY | Age: 73
Setting detail: THERAPIES SERIES
Discharge: HOME | End: 2023-02-22
Payer: MEDICARE

## 2023-02-22 DIAGNOSIS — Z79.01 ANTICOAGULATED ON COUMADIN: ICD-10-CM

## 2023-02-22 DIAGNOSIS — Z51.81 ENCOUNTER FOR THERAPEUTIC DRUG MONITORING: ICD-10-CM

## 2023-02-22 DIAGNOSIS — I48.0 PAF (PAROXYSMAL ATRIAL FIBRILLATION) (HCC): Primary | ICD-10-CM

## 2023-02-22 LAB — POC INR: 2 (ref 0.8–1.2)

## 2023-02-22 PROCEDURE — 85610 PROTHROMBIN TIME: CPT

## 2023-02-22 PROCEDURE — 99211 OFF/OP EST MAY X REQ PHY/QHP: CPT

## 2023-02-22 PROCEDURE — 36416 COLLJ CAPILLARY BLOOD SPEC: CPT

## 2023-02-22 NOTE — PROGRESS NOTES
Medication Management [address]  [phone] (phone)  [phone] (fax)    Mr. Abdon Hawkins is a 81 y.o.  male with history of Afib who presents today for anticoagulation monitoring and adjustment. Patient verifies current dosing regimen and tablet strength. No missed or extra doses. Patient denies s/s bleeding/bruising/chest pain. -Typical SOB, swelling. No blood in urine or stool. No dietary changes. No changes in medication/OTC agents/Herbals. - Doesn't know meds well off the top of the head, reports no recent changes. No change in alcohol use or tobacco use. No change in activity level. Patient denies headaches/dizziness/lightheadedness/falls. No vomiting/diarrhea or acute illness. No Procedures scheduled in the future at this time. Assessment:     Lab Results   Component Value Date    INR 2.00 (H) 02/22/2023    INR 1.80 (H) 02/08/2023    INR 1.70 (H) 01/18/2023     INR therapeutic   Recent Labs     02/22/23  1009   INR 2.00*   INR goal 2.0-3.0    Reviewed and discussed patient and INR with pharmacist.   Alexander Carreno, [employer] 2023    Plan:  Continue Coumadin 5 mg MF, 2.5 mg all other days. Recheck INR in 2 week(s). Patient reminded to call the Anticoagulation Clinic with any signs or symptoms of bleeding or with any medication changes. Patient given instructions utilizing the teach back method. After visit summary printed and reviewed with patient. Discharged ambulatory in no apparent distress.     For Pharmacy Admin Tracking Only  Time Spent (min): [address], PharmD, BCPS  2/22/2023  11:38 AM

## 2023-03-08 ENCOUNTER — HOSPITAL ENCOUNTER (OUTPATIENT)
Dept: PHARMACY | Age: 73
Setting detail: THERAPIES SERIES
Discharge: HOME | End: 2023-03-08
Payer: MEDICARE

## 2023-03-08 DIAGNOSIS — Z79.01 ANTICOAGULATED ON COUMADIN: ICD-10-CM

## 2023-03-08 DIAGNOSIS — Z51.81 ENCOUNTER FOR THERAPEUTIC DRUG MONITORING: ICD-10-CM

## 2023-03-08 DIAGNOSIS — I48.0 PAF (PAROXYSMAL ATRIAL FIBRILLATION) (HCC): Primary | ICD-10-CM

## 2023-03-08 LAB — POC INR: 2 (ref 0.8–1.2)

## 2023-03-08 PROCEDURE — 99211 OFF/OP EST MAY X REQ PHY/QHP: CPT

## 2023-03-08 PROCEDURE — 85610 PROTHROMBIN TIME: CPT

## 2023-03-08 PROCEDURE — 36416 COLLJ CAPILLARY BLOOD SPEC: CPT

## 2023-03-08 NOTE — PROGRESS NOTES
Medication Management Clinic  [facility]  Anticoagulation Clinic  [phone] (phone)  [phone] (fax)    Mr. Modesto López is a 72 y.o.  male with history of Afib who presents today for anticoagulation monitoring and adjustment.    Patient verifies current dosing regimen and tablet strength.  No missed or extra doses.  Patient denies s/s bleeding/bruising/swelling/SOB/chest pain   No blood in urine or stool.  No dietary changes.   No changes in medication/OTC agents/Herbals.  No change in alcohol use or tobacco use.  No change in activity level.  Patient denies headaches/dizziness/lightheadedness/falls.  No vomiting/diarrhea or acute illness.   No Procedures scheduled in the future at this time.     Assessment:   Lab Results   Component Value Date    INR 2.00 (H) 03/08/2023    INR 2.00 (H) 02/22/2023    INR 1.80 (H) 02/08/2023     INR therapeutic   Recent Labs     03/08/23  1026   INR 2.00*      INR Goal 2.0 - 3.0  Patient interview completed and discussed with pharmacist by Christine Anderson D Candidate 2023      Plan:  Continue Coumadin 5 mg MF, 2.5 mg all other days.  Recheck INR in 3 week(s).  Patient reminded to call the Anticoagulation Clinic with any signs or symptoms of bleeding or with any medication changes.  Patient given instructions utilizing the teach back method.        After visit summary printed and reviewed with patient.      Discharged ambulatory in no apparent distress.    For Pharmacy Admin Tracking Only  Time Spent (min): 20    Justin DoranD, BCPS  3/8/2023  12:10 PM

## 2023-03-29 ENCOUNTER — HOSPITAL ENCOUNTER (OUTPATIENT)
Dept: PHARMACY | Age: 73
Setting detail: THERAPIES SERIES
Discharge: HOME | End: 2023-03-29
Payer: MEDICARE

## 2023-03-29 DIAGNOSIS — Z51.81 ENCOUNTER FOR THERAPEUTIC DRUG MONITORING: ICD-10-CM

## 2023-03-29 DIAGNOSIS — I48.0 PAF (PAROXYSMAL ATRIAL FIBRILLATION) (HCC): Primary | ICD-10-CM

## 2023-03-29 DIAGNOSIS — Z79.01 ANTICOAGULATED ON COUMADIN: ICD-10-CM

## 2023-03-29 LAB — POC INR: 2.1 (ref 0.8–1.2)

## 2023-03-29 PROCEDURE — 99212 OFFICE O/P EST SF 10 MIN: CPT

## 2023-03-29 PROCEDURE — 36416 COLLJ CAPILLARY BLOOD SPEC: CPT

## 2023-03-29 PROCEDURE — 85610 PROTHROMBIN TIME: CPT

## 2023-03-29 RX ORDER — WARFARIN SODIUM 5 MG/1
TABLET ORAL
Qty: 60 TABLET | Refills: 5 | Status: SHIPPED | OUTPATIENT
Start: 2023-03-29

## 2023-03-29 RX ORDER — WARFARIN SODIUM 5 MG/1
TABLET ORAL EVERY EVENING
COMMUNITY

## 2023-03-29 NOTE — PROGRESS NOTES
Medication Management [address]   [phone] (phone)  [phone] (fax)    Mr. Zeferino Valentine is a 81 y.o.  male with history of atrial fib. , per Dr. Brandon Mancini referral, who presents today for Warfarin monitoring and adjustment (3 week visit). Patient verifies current dosing regimen and tablet strength. No missed or extra doses. Patient denies bleeding/bruising/chest pain. Has usual SOB/intermittent swelling of legs. No blood in urine or stool. No dietary changes. No changes in medication/OTC agents/herbals. No change in alcohol use or tobacco use. No change in activity level. Patient denies headaches/dizziness/lightheadedness/falls. No vomiting/diarrhea or acute illness. No procedures scheduled in the future at this time. Assessment:   Lab Results   Component Value Date    INR 2.10 (H) 03/29/2023    INR 2.00 (H) 03/08/2023    INR 2.00 (H) 02/22/2023     INR therapeutic - goal 2-3. Recent Labs     03/29/23  1012   INR 2.10*        Plan:  POCT INR performed/result reviewed. Continue PO Coumadin 5 mg MF, 2.5 mg TWThSS. Recheck INR in 4 week(s). Patient reminded to call the Anticoagulation Clinic with any signs or symptoms of bleeding or with any medication changes. Patient given instructions utilizing the teach back method. After visit summary printed and reviewed with patient. Discharged ambulatory in no apparent distress, with male friend.   Prescription renewed electronically by clinic pharmacist.    For Pharmacy Admin Tracking Only    Intervention Detail: Refill(s) Provided  Total # of Interventions Recommended: 1  Total # of Interventions Accepted: 1  Time Spent (min):  22

## 2023-04-26 ENCOUNTER — HOSPITAL ENCOUNTER (OUTPATIENT)
Dept: PHARMACY | Age: 73
Setting detail: THERAPIES SERIES
Discharge: HOME | End: 2023-04-26
Payer: MEDICARE

## 2023-04-26 DIAGNOSIS — Z79.01 ANTICOAGULATED ON COUMADIN: ICD-10-CM

## 2023-04-26 DIAGNOSIS — Z51.81 ENCOUNTER FOR THERAPEUTIC DRUG MONITORING: ICD-10-CM

## 2023-04-26 DIAGNOSIS — I48.0 PAF (PAROXYSMAL ATRIAL FIBRILLATION) (HCC): Primary | ICD-10-CM

## 2023-04-26 LAB — POC INR: 2 (ref 0.8–1.2)

## 2023-04-26 PROCEDURE — 36416 COLLJ CAPILLARY BLOOD SPEC: CPT

## 2023-04-26 PROCEDURE — 85610 PROTHROMBIN TIME: CPT

## 2023-04-26 PROCEDURE — 99211 OFF/OP EST MAY X REQ PHY/QHP: CPT

## 2023-04-26 NOTE — PROGRESS NOTES
Medication Management [address]  [phone] (phone)  [phone] (fax)    Mr. Roslyn Ojeda is a 81 y.o.  male with history of Afib who presents today for anticoagulation monitoring and adjustment. Patient verifies current dosing regimen and tablet strength. No missed or extra doses. Patient denies s/s bleeding/bruising/swelling/SOB/chest pain  No blood in urine or stool. No dietary changes. No changes in medication/OTC agents/Herbals. No change in alcohol use or tobacco use. No change in activity level. Patient denies headaches/dizziness/lightheadedness/falls. No vomiting/diarrhea or acute illness. No Procedures scheduled in the future at this time. Assessment:   Lab Results   Component Value Date    INR 2.00 (H) 04/26/2023    INR 2.10 (H) 03/29/2023    INR 2.00 (H) 03/08/2023     INR therapeutic   Recent Labs     04/26/23  1006   INR 2.00*         Plan:  Continue Coumadin 5 mg MF, 2.5 mg TWThSaSu. Recheck INR in 4 week(s). Patient reminded to call the Anticoagulation Clinic with any signs or symptoms of bleeding or with any medication changes. Patient given instructions utilizing the teach back method. After visit summary printed and reviewed with patient. Discharged ambulatory in no apparent distress.     For Pharmacy Admin Tracking Only    Time Spent (min): 20

## 2023-05-24 ENCOUNTER — HOSPITAL ENCOUNTER (OUTPATIENT)
Dept: PHARMACY | Age: 73
Setting detail: THERAPIES SERIES
Discharge: HOME | End: 2023-05-24
Payer: MEDICARE

## 2023-05-24 DIAGNOSIS — Z51.81 ENCOUNTER FOR THERAPEUTIC DRUG MONITORING: ICD-10-CM

## 2023-05-24 DIAGNOSIS — I48.0 PAF (PAROXYSMAL ATRIAL FIBRILLATION) (HCC): Primary | ICD-10-CM

## 2023-05-24 DIAGNOSIS — Z79.01 ANTICOAGULATED ON COUMADIN: ICD-10-CM

## 2023-05-24 LAB — POC INR: 2 (ref 0.8–1.2)

## 2023-05-24 PROCEDURE — 85610 PROTHROMBIN TIME: CPT

## 2023-05-24 PROCEDURE — 99211 OFF/OP EST MAY X REQ PHY/QHP: CPT

## 2023-05-24 PROCEDURE — 36416 COLLJ CAPILLARY BLOOD SPEC: CPT

## 2023-05-24 NOTE — PATIENT INSTRUCTIONS
You may receive a survey regarding the care you received during your visit. Your input is valuable to us. We encourage you to complete and return your survey. We hope you will choose us in the future for your healthcare needs. 24-May-2023 11:08 22-May-2023 05:46 22-May-2023 13:50

## 2023-05-24 NOTE — PROGRESS NOTES
Medication Management [address]  [phone] (phone)  [phone] (fax)    Mr. Rosario Palacios is a 81 y.o.  male with history of atrial fib. , per Dr. Yusef Warner referral, who presents today for Warfarin monitoring and adjustment (4 week visit). Patient verifies current dosing regimen and tablet strength. No missed or extra doses. Patient denies bleeding/bruising. Has usual SOB/intermittent swelling of legs/fleeting chest pain. No blood in urine or stool. No dietary changes. No changes in medication/OTC agents/herbals. States had tried Advair, but made him dizzy (listed as intolerance); reminded to notify provider that ordered it. No change in alcohol use or tobacco use. No change in activity level, but plans to increase with nice weather. Patient denies headaches/dizziness/lightheadedness/falls. No vomiting/diarrhea or acute illness. No procedures scheduled in the future at this time. Assessment:   Lab Results   Component Value Date    INR 2.00 (H) 05/24/2023    INR 2.00 (H) 04/26/2023    INR 2.10 (H) 03/29/2023     INR therapeutic - goal 2-3. Recent Labs     05/24/23  1005   INR 2.00*        Plan:  POCT INR performed/result reviewed. Increase PO Coumadin to 5 mg MWF, 2.5 mg TThSS (from 5 mg MF, 2.5 mg TWThSS=11.1% increase). Recheck INR in 2 week(s). (Report given - orders entered by Magdi Morales RPh., PharmD.) Patient reminded to call the Anticoagulation Clinic with any signs or symptoms of bleeding or with any medication changes. Patient given instructions utilizing the teach back method. After visit summary printed and reviewed with patient. Discharged ambulatory in no apparent distress, with male significant other.     For Pharmacy Admin Tracking Only    Intervention Detail: Dose Adjustment: 1, reason: Therapy Optimization  Total # of Interventions Recommended: 1  Total # of Interventions Accepted: 1  Time Spent (min):  22

## 2023-05-30 RX ORDER — TAMSULOSIN HYDROCHLORIDE 0.4 MG/1
CAPSULE ORAL
Qty: 180 CAPSULE | Refills: 3 | Status: SHIPPED | OUTPATIENT
Start: 2023-05-30

## 2023-06-07 ENCOUNTER — HOSPITAL ENCOUNTER (OUTPATIENT)
Dept: PHARMACY | Age: 73
Setting detail: THERAPIES SERIES
Discharge: HOME | End: 2023-06-07
Payer: MEDICARE

## 2023-06-07 DIAGNOSIS — Z51.81 ENCOUNTER FOR THERAPEUTIC DRUG MONITORING: ICD-10-CM

## 2023-06-07 DIAGNOSIS — Z79.01 ANTICOAGULATED ON COUMADIN: ICD-10-CM

## 2023-06-07 DIAGNOSIS — I48.0 PAF (PAROXYSMAL ATRIAL FIBRILLATION) (HCC): Primary | ICD-10-CM

## 2023-06-07 LAB — POC INR: 2.7 (ref 0.8–1.2)

## 2023-06-07 PROCEDURE — 99211 OFF/OP EST MAY X REQ PHY/QHP: CPT

## 2023-06-07 PROCEDURE — 36416 COLLJ CAPILLARY BLOOD SPEC: CPT

## 2023-06-07 PROCEDURE — 85610 PROTHROMBIN TIME: CPT

## 2023-06-07 NOTE — PROGRESS NOTES
Medication Management [address]  [phone] (phone)  [phone] (fax)    Mr. Lia Corbett is a 81 y.o.  male with history of atrial fib. , per Dr. Denis Beyer referral,  who presents today for Warfarin monitoring and adjustment (2 week visit after increasing dose by 11.1%). Patient verifies current dosing regimen and tablet strength. No missed or extra doses. Patient denies  bleeding/bruising/SOB/chest pain. Has usual intermittent swelling of legs, plus creaky knees. No blood in urine or stool. No dietary changes. No changes in medication/OTC agents/herbals. No change in alcohol use or tobacco use. No change in activity level. Patient denies headaches/dizziness/lightheadedness/falls. No vomiting/diarrhea or acute illness. No procedures scheduled in the future at this time. Sees PCP next week. Assessment:   Lab Results   Component Value Date    INR 2.70 (H) 06/07/2023    INR 2.00 (H) 05/24/2023    INR 2.00 (H) 04/26/2023     INR therapeutic - goal 2-3. Recent Labs     06/07/23  1006   INR 2.70*        Plan:  POCT INR performed/result reviewed. Continue PO Coumadin 5 mg MWF, 2.5 mg TThSS. Recheck INR in 3 week(s). (Report given - orders entered by [address] [facility] PharmD.)   Patient reminded to call the Anticoagulation Clinic with any signs or symptoms of bleeding or with any medication changes. Patient given instructions utilizing the teach back method. After visit summary printed and reviewed with patient. Discharged ambulatory in no apparent distress, with male significant other.     For Pharmacy Admin Tracking Only    Time Spent (min):  21

## 2023-06-28 ENCOUNTER — HOSPITAL ENCOUNTER (OUTPATIENT)
Dept: PHARMACY | Age: 73
Setting detail: THERAPIES SERIES
Discharge: HOME | End: 2023-06-28
Payer: MEDICARE

## 2023-06-28 DIAGNOSIS — Z79.01 ANTICOAGULATED ON COUMADIN: ICD-10-CM

## 2023-06-28 DIAGNOSIS — I48.0 PAF (PAROXYSMAL ATRIAL FIBRILLATION) (HCC): Primary | ICD-10-CM

## 2023-06-28 DIAGNOSIS — Z51.81 ENCOUNTER FOR THERAPEUTIC DRUG MONITORING: ICD-10-CM

## 2023-06-28 LAB — POC INR: 2.5 (ref 0.8–1.2)

## 2023-06-28 PROCEDURE — 99211 OFF/OP EST MAY X REQ PHY/QHP: CPT

## 2023-06-28 PROCEDURE — 36416 COLLJ CAPILLARY BLOOD SPEC: CPT

## 2023-06-28 PROCEDURE — 85610 PROTHROMBIN TIME: CPT

## 2023-06-28 RX ORDER — CALCIUM CARBONATE 750 MG/1
220 TABLET ORAL DAILY PRN
COMMUNITY
Start: 2023-06-16

## 2023-07-12 ENCOUNTER — TELEPHONE (OUTPATIENT)
Dept: CARDIOLOGY CLINIC | Age: 73
End: 2023-07-12

## 2023-07-12 DIAGNOSIS — I48.91 ATRIAL FIBRILLATION, UNSPECIFIED TYPE (HCC): Primary | ICD-10-CM

## 2023-07-12 NOTE — TELEPHONE ENCOUNTER
----- Message from Tory Carroll RN sent at 7/12/2023 10:08 AM EDT -----  Please renew annual referral for Anticoagulation Monitoring, #111. Thanks, L.  Jesusita Andrews RN BSN

## 2023-07-12 NOTE — TELEPHONE ENCOUNTER
----- Message from Hong Mccollum RN sent at 7/12/2023 10:08 AM EDT -----  Please renew annual referral for Anticoagulation Monitoring, #111. Thanks, SKYLER Pratt, RN BSN

## 2023-07-26 ENCOUNTER — HOSPITAL ENCOUNTER (OUTPATIENT)
Dept: PHARMACY | Age: 73
Setting detail: THERAPIES SERIES
Discharge: HOME | End: 2023-07-26
Payer: MEDICARE

## 2023-07-26 DIAGNOSIS — Z79.01 ANTICOAGULATED ON COUMADIN: ICD-10-CM

## 2023-07-26 DIAGNOSIS — Z51.81 ENCOUNTER FOR THERAPEUTIC DRUG MONITORING: ICD-10-CM

## 2023-07-26 DIAGNOSIS — I48.0 PAF (PAROXYSMAL ATRIAL FIBRILLATION) (HCC): Primary | ICD-10-CM

## 2023-07-26 LAB — POC INR: 2.5 (ref 0.8–1.2)

## 2023-07-26 PROCEDURE — 36416 COLLJ CAPILLARY BLOOD SPEC: CPT

## 2023-07-26 PROCEDURE — 85610 PROTHROMBIN TIME: CPT

## 2023-07-26 PROCEDURE — 99211 OFF/OP EST MAY X REQ PHY/QHP: CPT

## 2023-07-26 RX ORDER — FLUTICASONE PROPIONATE AND SALMETEROL 50; 250 UG/1; UG/1
POWDER RESPIRATORY (INHALATION)
COMMUNITY
Start: 2023-07-24

## 2023-07-26 NOTE — PROGRESS NOTES
Medication Management [address] [place] [address]  [phone] (phone)  [phone] (fax)    Mr. Quincy Kenny is a 81 y.o.  male with history of atrial fib. , per Dr. Abebe Valdes referral, who presents today for Warfarin monitoring and adjustment (4 week visit). Patient verifies current dosing regimen and tablet strength. No missed or extra doses. Patient denies bleeding. Has usual easy bruising from rosebushes. Has usual SOB/slight swelling of legs. No dietary changes. No changes in medication/OTC agents/herbals. Tried OsteoBi-flex, but stopped (didn't think it helped). No change in alcohol use or tobacco use. No change in activity level. Patient denies falls. No vomiting/diarrhea or acute illness. No procedures scheduled in the future at this time. Assessment:   Lab Results   Component Value Date    INR 2.50 (H) 07/26/2023    INR 2.50 (H) 06/28/2023    INR 2.70 (H) 06/07/2023     INR therapeutic - goal 2-3. Recent Labs     07/26/23  1036   INR 2.50*        Plan:  POCT INR performed/result reviewed. Continue PO Coumadin 5 mg MWF, 2.5 mg TThSS. Recheck INR in 4 week(s). Patient reminded to call the Anticoagulation Clinic with any signs or symptoms of bleeding or with any medication changes. Patient given instructions utilizing the teach back method. After visit summary printed and reviewed with patient. Discharged ambulatory in no apparent distress, with male significant other.     For Pharmacy Admin Tracking Only    Time Spent (min): 20

## 2023-08-17 NOTE — TELEPHONE ENCOUNTER
Trent Rivera called requesting a refill on the following medications:  Requested Prescriptions     Pending Prescriptions Disp Refills    tamsulosin (FLOMAX) 0.4 mg capsule [Pharmacy Med Name: tamsulosin 0.4 mg capsule] 180 capsule 3     Sig: TAKE 1 [address] verified:  .pv      Date of last visit:   Date of next visit (if applicable): 4/70/1802 Valtrex Pregnancy And Lactation Text: this medication is Pregnancy Category B and is considered safe during pregnancy. This medication is not directly found in breast milk but it's metabolite acyclovir is present.

## 2023-08-23 ENCOUNTER — HOSPITAL ENCOUNTER (OUTPATIENT)
Dept: PHARMACY | Age: 73
Setting detail: THERAPIES SERIES
Discharge: HOME | End: 2023-08-23
Payer: MEDICARE

## 2023-08-23 VITALS — TEMPERATURE: 98.3 F | SYSTOLIC BLOOD PRESSURE: 116 MMHG | DIASTOLIC BLOOD PRESSURE: 65 MMHG | HEART RATE: 86 BPM

## 2023-08-23 DIAGNOSIS — Z79.01 ANTICOAGULATED ON COUMADIN: ICD-10-CM

## 2023-08-23 DIAGNOSIS — Z51.81 ENCOUNTER FOR THERAPEUTIC DRUG MONITORING: ICD-10-CM

## 2023-08-23 DIAGNOSIS — I48.0 PAF (PAROXYSMAL ATRIAL FIBRILLATION) (HCC): Primary | ICD-10-CM

## 2023-08-23 LAB — POC INR: 2.3 (ref 0.8–1.2)

## 2023-08-23 PROCEDURE — 85610 PROTHROMBIN TIME: CPT

## 2023-08-23 PROCEDURE — 99212 OFFICE O/P EST SF 10 MIN: CPT

## 2023-08-23 PROCEDURE — 36416 COLLJ CAPILLARY BLOOD SPEC: CPT

## 2023-08-23 RX ORDER — WARFARIN SODIUM 5 MG/1
TABLET ORAL EVERY EVENING
COMMUNITY

## 2023-08-23 RX ORDER — WARFARIN SODIUM 5 MG/1
TABLET ORAL
Qty: 75 TABLET | Refills: 1 | Status: SHIPPED | OUTPATIENT
Start: 2023-08-23

## 2023-08-23 NOTE — PROGRESS NOTES
Medication Management [address] [place] [address]  [phone] (phone)  [phone] (fax)    Mr. Toño Thompson is a 81 y.o.  male with history of atrial fib. , per Dr. Kaur Francois referral, who presents today for Warfarin monitoring and adjustment (4 week visit). Patient verifies current dosing regimen and tablet strength. No missed or extra doses. Patient denies bleeding. Has usual intermittent swelling of legs/easy bruising. Thinks SOB is worse with hot weather. At first, said he was having dizziness no matter at rest or position change; later decided it's blurred vision. Sees PCP in 2 weeks. Advised to call for earlier visit. Also recommended eyes be checked. Asked patient if he drinks plenty of fluids in hot weather; mainly drinks coffee/pop. Reminded the caffeine can increase chance of dehydration. No blood in urine or stool. No dietary changes. No changes in medication/OTC agents/herbals. Removed Voltaren gel and Naproxen from list; never used, doesn't intend to. No change in alcohol use or tobacco use. No change in activity level. Patient denies falls. No vomiting/diarrhea or acute illness. No procedures scheduled in the future at this time. 116/65-86, Dinamap.  98.3 temporally. Skin warm/dry. Took morning pills 2.25 hours ago. Assessment:   Lab Results   Component Value Date    INR 2.30 (H) 08/23/2023    INR 2.50 (H) 07/26/2023    INR 2.50 (H) 06/28/2023     INR therapeutic - goal 2-3. Recent Labs     08/23/23  1011   INR 2.30*        Plan:  POCT INR performed/result reviewed. Continue PO Coumadin 5 mg MWF, 2.5 mg TThSS. Recheck INR in 4 week(s). Patient reminded to call the Anticoagulation Clinic with any signs or symptoms of bleeding or with any medication changes. Patient given instructions utilizing the teach back method. After visit summary printed and reviewed with patient.       Discharged ambulatory in no apparent distress, with male

## 2023-09-13 ENCOUNTER — HOSPITAL ENCOUNTER (OUTPATIENT)
Dept: GENERAL RADIOLOGY | Age: 73
Discharge: HOME | End: 2023-09-13
Payer: MEDICARE

## 2023-09-13 ENCOUNTER — HOSPITAL ENCOUNTER (OUTPATIENT)
Age: 73
Discharge: HOME | End: 2023-09-13
Payer: MEDICARE

## 2023-09-13 DIAGNOSIS — M25.561 RIGHT KNEE PAIN, UNSPECIFIED CHRONICITY: ICD-10-CM

## 2023-09-13 PROCEDURE — 73562 X-RAY EXAM OF KNEE 3: CPT

## 2023-09-13 PROCEDURE — 73502 X-RAY EXAM HIP UNI 2-3 VIEWS: CPT

## 2023-09-18 ENCOUNTER — TELEPHONE (OUTPATIENT)
Dept: PHARMACY | Age: 73
End: 2023-09-18

## 2023-09-18 NOTE — TELEPHONE ENCOUNTER
Patient called  and states he has been put on Ciprofloxacin  mg  1 tab every 12 hours for 7 days. He started this on Friday. Please call patient with Coumadin instructions. He does have an appointment with us on Wednesday.

## 2023-09-18 NOTE — TELEPHONE ENCOUNTER
Patient's sister Fernando Stone called again. Spoke with her on the phone. She states patient started Cipro on Friday 9/15/23. She also states that patient has already taken his Coumadin today. Discussed interaction with patient's sister. She would like his appointment rescheduled from 9/20/23 to tomorrow 9/19/23 at 10:20 am to see where INR is with interaction and if dose adjustment is necessary. Rescheduled patient for tomorrow.     Suzanne Mcqueen, JustinD, BCPS  9/18/2023  2:37 PM

## 2023-09-19 ENCOUNTER — HOSPITAL ENCOUNTER (OUTPATIENT)
Dept: PHARMACY | Age: 73
Setting detail: THERAPIES SERIES
Discharge: HOME | End: 2023-09-19
Payer: MEDICARE

## 2023-09-19 ENCOUNTER — HOSPITAL ENCOUNTER (OUTPATIENT)
Dept: GENERAL RADIOLOGY | Age: 73
Discharge: HOME | End: 2023-09-19
Payer: MEDICARE

## 2023-09-19 ENCOUNTER — HOSPITAL ENCOUNTER (OUTPATIENT)
Age: 73
Discharge: HOME | End: 2023-09-19
Payer: MEDICARE

## 2023-09-19 DIAGNOSIS — J44.9 OBSTRUCTIVE CHRONIC BRONCHITIS WITHOUT EXACERBATION (HCC): ICD-10-CM

## 2023-09-19 DIAGNOSIS — Z51.81 ENCOUNTER FOR THERAPEUTIC DRUG MONITORING: ICD-10-CM

## 2023-09-19 DIAGNOSIS — I48.0 PAF (PAROXYSMAL ATRIAL FIBRILLATION) (HCC): Primary | ICD-10-CM

## 2023-09-19 DIAGNOSIS — Z79.01 ANTICOAGULATED ON COUMADIN: ICD-10-CM

## 2023-09-19 LAB — POC INR: 2.4 (ref 0.8–1.2)

## 2023-09-19 PROCEDURE — 36416 COLLJ CAPILLARY BLOOD SPEC: CPT

## 2023-09-19 PROCEDURE — 99211 OFF/OP EST MAY X REQ PHY/QHP: CPT

## 2023-09-19 PROCEDURE — 85610 PROTHROMBIN TIME: CPT

## 2023-09-19 PROCEDURE — 71045 X-RAY EXAM CHEST 1 VIEW: CPT

## 2023-09-19 NOTE — PROGRESS NOTES
Medication Management [address] [place] [address]  [phone] (phone)  [phone] (fax)    Mr. Ryann Clark is a 72 y.o.  male with history of  PAF  who presents today for anticoagulation monitoring and adjustment. Patient verifies current dosing regimen and tablet strength. No missed or extra doses. Patient denies s/s bleeding/bruising/swelling/SOB  No blood in urine or stool. No dietary changes. No changes in /OTC agents/Herbals. Cipro 500 mg tab BID x 7 days, started Saturday because his kidneys were bothering him. No change in alcohol use or tobacco use. No change in activity level. Patient denies falls. No vomiting/diarrhea or acute illness. No Procedures scheduled in the future at this time. Assessment:     Lab Results   Component Value Date    INR 2.40 (H) 09/19/2023    INR 2.30 (H) 08/23/2023    INR 2.50 (H) 07/26/2023     INR therapeutic   Recent Labs     09/19/23  1035   INR 2.40*      Patient interview completed and discussed with pharmacist by Laura Wall PharmD Candidate    Plan:  Continue Coumadin 5mg MWF, 2.5mg all other days. Recheck INR in 4 week(s). Patient reminded to call the Anticoagulation Clinic with any signs or symptoms of bleeding or with any medication changes. Patient given instructions utilizing the teach back method. Note: Patient had questions regarding resolution of possible kidney infection. Advised patient to call doctor if he is still having pain after finishing antibiotics, or if pain worsens/develops fever or other S/S of infection. Is also following up with nephrology 9/28, encouraged to go to appointment    After visit summary printed and reviewed with patient. Discharged ambulatory in no apparent distress.      Teri Prader, [place]   Pharmacy Resident  9/19/2023 11:01 AM    For Pharmacy Admin Tracking Only  Time Spent (min): 30

## 2023-09-29 ENCOUNTER — TELEPHONE (OUTPATIENT)
Dept: UROLOGY | Age: 73
End: 2023-09-29

## 2023-09-29 ENCOUNTER — OFFICE VISIT (OUTPATIENT)
Dept: UROLOGY | Age: 73
End: 2023-09-29

## 2023-09-29 VITALS
BODY MASS INDEX: 34.36 KG/M2 | HEIGHT: 70 IN | DIASTOLIC BLOOD PRESSURE: 62 MMHG | WEIGHT: 240 LBS | SYSTOLIC BLOOD PRESSURE: 112 MMHG

## 2023-09-29 DIAGNOSIS — R31.0 GROSS HEMATURIA: Primary | ICD-10-CM

## 2023-09-29 DIAGNOSIS — M54.41 ACUTE BILATERAL LOW BACK PAIN WITH BILATERAL SCIATICA: ICD-10-CM

## 2023-09-29 DIAGNOSIS — N40.1 BENIGN PROSTATIC HYPERPLASIA WITH URINARY RETENTION: ICD-10-CM

## 2023-09-29 DIAGNOSIS — M54.42 ACUTE BILATERAL LOW BACK PAIN WITH BILATERAL SCIATICA: ICD-10-CM

## 2023-09-29 DIAGNOSIS — R33.8 BENIGN PROSTATIC HYPERPLASIA WITH URINARY RETENTION: ICD-10-CM

## 2023-09-29 LAB
BILIRUBIN URINE: ABNORMAL
BLOOD URINE, POC: NEGATIVE
CHARACTER, URINE: CLEAR
COLOR, URINE: YELLOW
GLUCOSE URINE: NEGATIVE MG/DL
KETONES, URINE: NEGATIVE
LEUKOCYTE CLUMPS, URINE: NEGATIVE
NITRITE, URINE: NEGATIVE
PH, URINE: 5.5 (ref 5–9)
POST VOID RESIDUAL (PVR): 61 ML
PROTEIN, URINE: 30 MG/DL
SPECIFIC GRAVITY, URINE: >= 1.03 (ref 1–1.03)
UROBILINOGEN, URINE: 1 EU/DL (ref 0–1)

## 2023-09-29 RX ORDER — FINASTERIDE 5 MG/1
5 TABLET, FILM COATED ORAL DAILY
Qty: 90 TABLET | Refills: 3 | Status: SHIPPED | OUTPATIENT
Start: 2023-09-29

## 2023-09-29 RX ORDER — TAMSULOSIN HYDROCHLORIDE 0.4 MG/1
0.4 CAPSULE ORAL 2 TIMES DAILY
Qty: 180 CAPSULE | Refills: 3 | Status: SHIPPED | OUTPATIENT
Start: 2023-09-29

## 2023-09-29 ASSESSMENT — ENCOUNTER SYMPTOMS
BACK PAIN: 1
VOMITING: 0
NAUSEA: 0
ABDOMINAL PAIN: 0

## 2023-09-29 NOTE — PROGRESS NOTES
Component Value Date/Time    BUN 15 11/18/2019 10:42 AM    CREATININE 0.8 11/18/2019 10:42 AM         Assessment:   BPH with LUTs  Bilateral renal cysts  Bilateral nonobstructive nephrolithiasis  Bilateral low back pain  Scrotal sebaceous cyst  Current tobacco user  Hx CVA    Plan:     Continue finasteride and flomax. Refills sent to pharmacy. Reported recent UTI on antibiotics per pcp. No urine cultures in chart. Urine dip in office today. Pt's low back pain is bilateral, worsened with movement, and associated with bilateral sciatica. Appears more consistent with potential musculoskeletal cause. Mild R CVA tenderness on exam.  Hx bilateral nonobstructive nephrolithiasis. Will check renal us with appt to review results to fully rule out any urologic cause to patient's pain and discomfort. Renal US with appt to review results.

## 2023-09-29 NOTE — TELEPHONE ENCOUNTER
Patient scheduled for US RENAL COMPLETE  at [facility] MR on 10/4/23. Arrival of 245 PM for a 3 PM  scan time.  NO CARBONATED BEVERAGES THE DAY OF AND BE WELL HYDRATED   Order mailed with instructions or given to the patient in the office

## 2023-10-04 ENCOUNTER — HOSPITAL ENCOUNTER (OUTPATIENT)
Dept: ULTRASOUND IMAGING | Age: 73
Discharge: HOME | End: 2023-10-04
Payer: MEDICARE

## 2023-10-04 DIAGNOSIS — M54.42 ACUTE BILATERAL LOW BACK PAIN WITH BILATERAL SCIATICA: ICD-10-CM

## 2023-10-04 DIAGNOSIS — M54.41 ACUTE BILATERAL LOW BACK PAIN WITH BILATERAL SCIATICA: ICD-10-CM

## 2023-10-04 PROCEDURE — 76770 US EXAM ABDO BACK WALL COMP: CPT

## 2023-10-06 ENCOUNTER — OFFICE VISIT (OUTPATIENT)
Dept: UROLOGY | Age: 73
End: 2023-10-06
Payer: MEDICARE

## 2023-10-06 VITALS
HEIGHT: 70 IN | WEIGHT: 240 LBS | DIASTOLIC BLOOD PRESSURE: 74 MMHG | BODY MASS INDEX: 34.36 KG/M2 | SYSTOLIC BLOOD PRESSURE: 138 MMHG

## 2023-10-06 DIAGNOSIS — N28.9 RENAL LESION: Primary | ICD-10-CM

## 2023-10-06 PROCEDURE — 1123F ACP DISCUSS/DSCN MKR DOCD: CPT | Performed by: NURSE PRACTITIONER

## 2023-10-06 PROCEDURE — 99214 OFFICE O/P EST MOD 30 MIN: CPT | Performed by: NURSE PRACTITIONER

## 2023-10-06 PROCEDURE — 3078F DIAST BP <80 MM HG: CPT | Performed by: NURSE PRACTITIONER

## 2023-10-06 PROCEDURE — 3075F SYST BP GE 130 - 139MM HG: CPT | Performed by: NURSE PRACTITIONER

## 2023-10-06 ASSESSMENT — ENCOUNTER SYMPTOMS
VOMITING: 0
ABDOMINAL PAIN: 0
NAUSEA: 0
BACK PAIN: 0

## 2023-10-06 NOTE — PROGRESS NOTES
[address]  [place] [address]  Dept: [phone]  Loc: [phone]    Visit Date: 10/6/2023        HPI:     Soni Thompson is a 72 y.o. male who presents today for:  Chief Complaint   Patient presents with    Follow-up     Review RD       HPI    Pt seen in follow up for BPH     Pt has a hx of BPH with urinary retention that improved on Flomax BID and finasteride 5 mg daily. Cystoscopy was performed 8/11/2020 by Dr. Abdi Burgos with findings of moderate lateral lobe hypertrophy of the prostate and bladder diverticulum on the dome. CT urogram performed for micro hematuria noted bilateral nonobstructive nephrolithiasis and a hyperdense lesion at the superior pole of the right kidney measuring approximately 1.1 cms--? Possible hemorrhagic cyst. Subsequent renal us's have shown stability of bilateral renal cysts. Pt reports he is urinating well. Denies dysuria, hematuria. Pt and friend with him today reports he was treated for a uti by pcp for low back pain. Pt reports he continues with low back pain that worsens with bending over and certain position changes. Notes pain shoots down legs bilaterally when it occurs. Notes he saw provider at [facility] and has \"bad back and bad knees\". Current Outpatient Medications   Medication Sig Dispense Refill    tamsulosin (FLOMAX) 0.4 MG capsule Take 1 capsule by mouth 2 times daily 180 capsule 3    warfarin (COUMADIN) 5 MG tablet Take as directed by [facility] (# 75 ~ 90 day supply) 75 tablet 1    warfarin (COUMADIN) 5 MG tablet Take by mouth every evening Dosed by [facility] Coumadin [facility].       ADVAIR DISKUS 250-50 MCG/ACT AEPB diskus inhaler INHALE ONE PUFF BY MOUTH EVERY TWELVE HOURS      dilTIAZem (CARDIZEM CD) 180 MG extended release capsule Take 1 capsule by mouth daily 90 capsule 2    LINZESS 145 MCG capsule Take by mouth See Admin

## 2023-10-11 ENCOUNTER — TELEPHONE (OUTPATIENT)
Dept: UROLOGY | Age: 73
End: 2023-10-11

## 2023-10-12 ENCOUNTER — OFFICE VISIT (OUTPATIENT)
Dept: CARDIOLOGY CLINIC | Age: 73
End: 2023-10-12
Payer: MEDICARE

## 2023-10-12 VITALS
HEART RATE: 81 BPM | HEIGHT: 70 IN | BODY MASS INDEX: 33.36 KG/M2 | WEIGHT: 233 LBS | DIASTOLIC BLOOD PRESSURE: 80 MMHG | SYSTOLIC BLOOD PRESSURE: 132 MMHG

## 2023-10-12 DIAGNOSIS — R07.9 CHEST PAIN IN ADULT: ICD-10-CM

## 2023-10-12 DIAGNOSIS — I48.91 ATRIAL FIBRILLATION, UNSPECIFIED TYPE (HCC): Primary | ICD-10-CM

## 2023-10-12 DIAGNOSIS — R06.02 SOB (SHORTNESS OF BREATH): ICD-10-CM

## 2023-10-12 PROCEDURE — 99214 OFFICE O/P EST MOD 30 MIN: CPT | Performed by: INTERNAL MEDICINE

## 2023-10-12 PROCEDURE — 3079F DIAST BP 80-89 MM HG: CPT | Performed by: INTERNAL MEDICINE

## 2023-10-12 PROCEDURE — 3075F SYST BP GE 130 - 139MM HG: CPT | Performed by: INTERNAL MEDICINE

## 2023-10-12 PROCEDURE — 1123F ACP DISCUSS/DSCN MKR DOCD: CPT | Performed by: INTERNAL MEDICINE

## 2023-10-12 PROCEDURE — 93000 ELECTROCARDIOGRAM COMPLETE: CPT | Performed by: INTERNAL MEDICINE

## 2023-10-12 NOTE — PROGRESS NOTES
lead            Afib on coumadin  CVA  HTN  Smoker  claudication    States no significant symptoms   Smokes 1ppd  Family says he sleeps a lot   Stopped metoprolol on last visit, and his fatigue improved  Advised to stop smoking because smoking increases risk of heart disease, morbidity, mortality and end organ damage. [facility] Cath August 2019 showed patent coronaries   Patient states that he takes all his medications regularly  On coumadin and goes to coumadin clinic  No bleeding issues, understands bleeding risks  The patient is asked to make an attempt to improve diet and exercise patterns to aid in medical management of this problem. Advised more plant based nutrition/meditarrean diet   Advised patient to call office or seek immediate medical attention if there is any new onset of  any chest pain, sob, palpitations, lightheadedness, dizziness, orthopnea, PND or pedal edema. All medication side effects were discussed in details. Thank you for allowing me to participate in the care of this patient. Please do not hesitate to contact me for any further questions. Return in about 1 year (around 10/12/2024), or if symptoms worsen or fail to improve, for Regular follow up, Review testing.        Electronically signed by Iglesia Lizarraga MD [facility]  10/12/2023 at 11:18 AM

## 2023-10-12 NOTE — PROGRESS NOTES
Pt here for 1 yr check up     EKG done today     Pt continues with chest pain , swelling , sob , dizziness

## 2023-10-17 ENCOUNTER — HOSPITAL ENCOUNTER (OUTPATIENT)
Dept: PHARMACY | Age: 73
Setting detail: THERAPIES SERIES
Discharge: HOME | End: 2023-10-17
Payer: MEDICARE

## 2023-10-17 DIAGNOSIS — Z79.01 ANTICOAGULATED ON COUMADIN: ICD-10-CM

## 2023-10-17 DIAGNOSIS — I48.0 PAF (PAROXYSMAL ATRIAL FIBRILLATION) (HCC): Primary | ICD-10-CM

## 2023-10-17 DIAGNOSIS — Z51.81 ENCOUNTER FOR THERAPEUTIC DRUG MONITORING: ICD-10-CM

## 2023-10-17 LAB — POC INR: 2.8 (ref 0.8–1.2)

## 2023-10-17 PROCEDURE — 36416 COLLJ CAPILLARY BLOOD SPEC: CPT

## 2023-10-17 PROCEDURE — 99211 OFF/OP EST MAY X REQ PHY/QHP: CPT

## 2023-10-17 PROCEDURE — 85610 PROTHROMBIN TIME: CPT

## 2023-10-17 NOTE — PROGRESS NOTES
Medication Management [address] [place] [address]  [phone] (phone)  [phone] (fax)    Mr. Analia Kaba is a 72 y.o.  male with history of Afib who presents today for anticoagulation monitoring and adjustment. Patient verifies current dosing regimen and tablet strength. No missed or extra doses. Patient denies s/s bleeding/bruising/swelling/SOB  No blood in urine or stool. No dietary changes. No changes in medication/OTC agents/Herbals. No change in alcohol use or tobacco use. No change in activity level. Patient denies falls. No vomiting/diarrhea or acute illness. No Procedures scheduled in the future at this time. Assessment:   Lab Results   Component Value Date    INR 2.80 (H) 10/17/2023    INR 2.40 (H) 09/19/2023    INR 2.30 (H) 08/23/2023     INR therapeutic   Recent Labs     10/17/23  1022   INR 2.80*        Goal 2 - 3    Plan:  Continue Coumadin 5 mg MWF, 2.5 mg all other days. Recheck INR in 4 week(s). Patient reminded to call the Anticoagulation Clinic with any signs or symptoms of bleeding or with any medication changes. Patient given instructions utilizing the teach back method. After visit summary printed and reviewed with patient. Discharged ambulatory in no apparent distress.     Cooper Glover PharmD  10/17/2023 10:30 AM     For Pharmacy Admin Tracking Only    Time Spent (min): 20,

## 2023-10-31 ENCOUNTER — HOSPITAL ENCOUNTER (OUTPATIENT)
Dept: MRI IMAGING | Age: 73
Discharge: HOME | End: 2023-10-31
Payer: MEDICARE

## 2023-10-31 DIAGNOSIS — N28.9 RENAL LESION: ICD-10-CM

## 2023-10-31 PROCEDURE — 6360000004 HC RX CONTRAST MEDICATION: Performed by: NURSE PRACTITIONER

## 2023-10-31 PROCEDURE — 74183 MRI ABD W/O CNTR FLWD CNTR: CPT

## 2023-10-31 PROCEDURE — A9579 GAD-BASE MR CONTRAST NOS,1ML: HCPCS | Performed by: NURSE PRACTITIONER

## 2023-10-31 RX ADMIN — GADOTERIDOL 20 ML: 279.3 INJECTION, SOLUTION INTRAVENOUS at 14:20

## 2023-11-07 ENCOUNTER — OFFICE VISIT (OUTPATIENT)
Dept: UROLOGY | Age: 73
End: 2023-11-07
Payer: MEDICARE

## 2023-11-07 VITALS — RESPIRATION RATE: 18 BRPM | BODY MASS INDEX: 33.36 KG/M2 | HEIGHT: 70 IN | WEIGHT: 233 LBS

## 2023-11-07 DIAGNOSIS — R31.0 GROSS HEMATURIA: Primary | ICD-10-CM

## 2023-11-07 LAB
BILIRUBIN URINE: ABNORMAL
BLOOD URINE, POC: ABNORMAL
CHARACTER, URINE: CLEAR
COLOR, URINE: ABNORMAL
GLUCOSE URINE: NEGATIVE MG/DL
KETONES, URINE: NEGATIVE
LEUKOCYTE CLUMPS, URINE: NEGATIVE
NITRITE, URINE: NEGATIVE
PH, URINE: 5.5 (ref 5–9)
PROTEIN, URINE: ABNORMAL MG/DL
SPECIFIC GRAVITY, URINE: 1.02 (ref 1–1.03)
UROBILINOGEN, URINE: 1 EU/DL (ref 0–1)

## 2023-11-07 PROCEDURE — 99214 OFFICE O/P EST MOD 30 MIN: CPT | Performed by: NURSE PRACTITIONER

## 2023-11-07 PROCEDURE — 81003 URINALYSIS AUTO W/O SCOPE: CPT | Performed by: NURSE PRACTITIONER

## 2023-11-07 PROCEDURE — 1123F ACP DISCUSS/DSCN MKR DOCD: CPT | Performed by: NURSE PRACTITIONER

## 2023-11-07 ASSESSMENT — ENCOUNTER SYMPTOMS
NAUSEA: 0
BACK PAIN: 0
ABDOMINAL PAIN: 0
VOMITING: 0

## 2023-11-09 LAB
BACTERIA UR CULT: ABNORMAL
ORGANISM: ABNORMAL

## 2023-11-14 ENCOUNTER — PROCEDURE VISIT (OUTPATIENT)
Dept: UROLOGY | Age: 73
End: 2023-11-14
Payer: MEDICARE

## 2023-11-14 VITALS — WEIGHT: 231 LBS | HEIGHT: 70 IN | RESPIRATION RATE: 16 BRPM | BODY MASS INDEX: 33.07 KG/M2

## 2023-11-14 DIAGNOSIS — N40.1 BENIGN PROSTATIC HYPERPLASIA WITH URINARY RETENTION: ICD-10-CM

## 2023-11-14 DIAGNOSIS — R31.0 GROSS HEMATURIA: Primary | ICD-10-CM

## 2023-11-14 DIAGNOSIS — R33.8 BENIGN PROSTATIC HYPERPLASIA WITH URINARY RETENTION: ICD-10-CM

## 2023-11-14 DIAGNOSIS — N28.9 RENAL LESION: ICD-10-CM

## 2023-11-14 PROCEDURE — 52000 CYSTOURETHROSCOPY: CPT | Performed by: UROLOGY

## 2023-11-14 RX ORDER — CIPROFLOXACIN 500 MG/1
500 TABLET, FILM COATED ORAL 2 TIMES DAILY
Qty: 14 TABLET | Refills: 0 | Status: SHIPPED | OUTPATIENT
Start: 2023-11-14 | End: 2023-11-21

## 2023-11-14 NOTE — PROGRESS NOTES
Cystoscopy    Operative Note    Patient:  Shawn Van  MRN: [id number]  YOB: 1950    Date: 11/14/23  Surgeon: Zuleyka Frazier MD  Anesthesia: Alexia Newby Local  Indications:   Gross hematuria  Position: Supine  EBL: 0 ml    Findings:   The patient was prepped and draped in the usual sterile fashion. The flexible cystoscope was advanced through the urethra and into the bladder. The bladder was thoroughly inspected and the following was noted:    Residual Urine: moderate. Urine clear, with no obvious infection  Urethra: No abnormalities of the urethra are noted. Urethral dilation was not performed. Prostate: lateral lobe hypertrophy + present, prostate moderately obstructing, intravesical extension of prostate not present. There was no previous TURP defect. (30)  Bladder: no tumor noted . Moderate trabeculation noted. small bladder diverticulum. Ureters: Orifices with normal configuration and location. The cystoscope was removed. The patient tolerated the procedure well.   Hematuria workup negative   Cyst bosniak 2 no need to follow  F/u britni six months w pvr

## 2023-11-15 ENCOUNTER — HOSPITAL ENCOUNTER (OUTPATIENT)
Dept: PHARMACY | Age: 73
Setting detail: THERAPIES SERIES
Discharge: HOME | End: 2023-11-15
Payer: MEDICARE

## 2023-11-15 DIAGNOSIS — Z51.81 ENCOUNTER FOR THERAPEUTIC DRUG MONITORING: ICD-10-CM

## 2023-11-15 DIAGNOSIS — I48.0 PAF (PAROXYSMAL ATRIAL FIBRILLATION) (HCC): Primary | ICD-10-CM

## 2023-11-15 DIAGNOSIS — Z79.01 ANTICOAGULATED ON COUMADIN: ICD-10-CM

## 2023-11-15 LAB — POC INR: 3.2 (ref 0.8–1.2)

## 2023-11-15 PROCEDURE — 99211 OFF/OP EST MAY X REQ PHY/QHP: CPT

## 2023-11-15 PROCEDURE — 36416 COLLJ CAPILLARY BLOOD SPEC: CPT

## 2023-11-15 PROCEDURE — 85610 PROTHROMBIN TIME: CPT

## 2023-11-15 NOTE — PROGRESS NOTES
Medication Management [address] [place] [address]  [phone] (phone)  [phone] (fax)    Mr. Shawn Van is a 72 y.o.  male with history of atrial fib. , per Dr. Jatin Arriola referral, who presents today for Warfarin monitoring and adjustment (4 week visit). Patient verifies current dosing regimen and tablet strength. No missed or extra doses. Patient denies bruising. Has usual intermittent SOB and swelling of legs. No blood in stool. Continues to see blood in urine intermittently. No dietary changes. No changes in medication/OTC agents/herbals, except Cipro. No change in alcohol use or tobacco use. No change in activity level. Patient denies falls. No vomiting/diarrhea or acute illness. No procedures scheduled in the future at this time. Had cystoscopy yesterday to determine cause of hematuria - states no reason found. That provider ordered 1 week of Cipro 500 mg BID that he'll get today. Assessment:     Lab Results   Component Value Date    INR 3.20 (H) 11/15/2023    INR 2.80 (H) 10/17/2023    INR 2.40 (H) 09/19/2023     INR supratherapeutic - goal 2-3. Recent Labs     11/15/23  1032   INR 3.20*      Plan:  POCT INR performed/result reviewed. 2.5 mg today, W, then continue PO Coumadin 5 mg MWF, 2.5 mg TThSS. Recheck INR 11/27. (Report given - orders entered by Sherol Aschoff, [facility] PharmD.)  Patient reminded to call the Anticoagulation Clinic with any signs or symptoms of bleeding or with any medication changes. Patient given instructions utilizing the teach back method. After visit summary printed and reviewed with patient/friend. Advised extra caution. Discharged ambulatory in no apparent distress, with male significant other.     For Pharmacy Admin Tracking Only    Intervention Detail: Dose Adjustment: 1, reason: Interaction  Total # of Interventions Recommended: 1  Total # of Interventions Accepted: 1  Time Spent (min):  25

## 2023-11-27 ENCOUNTER — HOSPITAL ENCOUNTER (OUTPATIENT)
Dept: PHARMACY | Age: 73
Setting detail: THERAPIES SERIES
Discharge: HOME | End: 2023-11-27
Payer: MEDICARE

## 2023-11-27 DIAGNOSIS — Z51.81 ENCOUNTER FOR THERAPEUTIC DRUG MONITORING: ICD-10-CM

## 2023-11-27 DIAGNOSIS — I48.0 PAF (PAROXYSMAL ATRIAL FIBRILLATION) (HCC): Primary | ICD-10-CM

## 2023-11-27 DIAGNOSIS — Z79.01 ANTICOAGULATED ON COUMADIN: ICD-10-CM

## 2023-11-27 LAB — POC INR: 3.1 (ref 0.8–1.2)

## 2023-11-27 PROCEDURE — 36416 COLLJ CAPILLARY BLOOD SPEC: CPT | Performed by: PHARMACIST

## 2023-11-27 PROCEDURE — 99211 OFF/OP EST MAY X REQ PHY/QHP: CPT | Performed by: PHARMACIST

## 2023-11-27 PROCEDURE — 85610 PROTHROMBIN TIME: CPT | Performed by: PHARMACIST

## 2023-11-27 NOTE — PROGRESS NOTES
Medication Management [address] [facility]  [phone] (phone)  [phone] (fax)    Mr. Emma Clemente is a 72 y.o.  male with history of Afib who presents today for anticoagulation monitoring and adjustment. Patient verifies current dosing regimen and tablet strength. No missed or extra doses. Patient denies s/s bleeding/bruising/swelling/SOB  Blood in urine or stool.  - Has had blood in the urine intermittently - had a scope done; doctor said nothing to be concerned about   No dietary changes. No changes in medication/OTC agents/Herbals. No change in alcohol use or tobacco use. No change in activity level. Patient denies falls. No vomiting/diarrhea or acute illness. No Procedures scheduled in the future at this time. Assessment:   Lab Results   Component Value Date    INR 3.10 (H) 11/27/2023    INR 3.20 (H) 11/15/2023    INR 2.80 (H) 10/17/2023     INR supratherapeutic   Recent Labs     11/27/23  1027   INR 3.10*      Patient interview completed and discussed with pharmacist by Rocio Chapman PharmD Candidate     Patient supratherapeutic. Patient finished ciprofloxacin on 11/22    Plan:  Coumadin 2.5 mg x 1 then continue Coumadin 5 mg MWF, 2.5 mg TThSS. Recheck INR in 3 week(s). Patient reminded to call the Anticoagulation Clinic with any signs or symptoms of bleeding or with any medication changes. Patient given instructions utilizing the teach back method. After visit summary printed and reviewed with patient. Discharged ambulatory in no apparent distress.     For Pharmacy Admin Tracking Only    Intervention Detail: Dose Adjustment: 1, reason: Therapy De-escalation  Total # of Interventions Recommended: 1  Total # of Interventions Accepted: 1  Time Spent (min): 1500 [facility], PharmD, BCPS  11/27/2023  10:43 AM

## 2024-01-17 ENCOUNTER — HOSPITAL ENCOUNTER (OUTPATIENT)
Dept: PHARMACY | Age: 74
Setting detail: THERAPIES SERIES
Discharge: HOME | End: 2024-01-17
Payer: MEDICAID

## 2024-01-17 DIAGNOSIS — Z79.01 ANTICOAGULATED ON COUMADIN: ICD-10-CM

## 2024-01-17 DIAGNOSIS — I48.0 PAF (PAROXYSMAL ATRIAL FIBRILLATION) (HCC): Primary | ICD-10-CM

## 2024-01-17 DIAGNOSIS — Z51.81 ENCOUNTER FOR THERAPEUTIC DRUG MONITORING: ICD-10-CM

## 2024-01-17 LAB — POC INR: 2.8 (ref 0.8–1.2)

## 2024-01-17 PROCEDURE — 85610 PROTHROMBIN TIME: CPT

## 2024-01-17 PROCEDURE — 99211 OFF/OP EST MAY X REQ PHY/QHP: CPT

## 2024-01-17 PROCEDURE — 36416 COLLJ CAPILLARY BLOOD SPEC: CPT

## 2024-01-17 NOTE — PROGRESS NOTES
Medication Management Clinic  [facility]  Anticoagulation Clinic  [phone] (phone)  [phone] (fax)    Mr. Modesto López is a 73 y.o.  male with history of atrial fib., per Dr. Ingram's referral, who presents today for Warfarin monitoring and adjustment (4 week visit).    Patient verifies current dosing regimen and tablet strength. Thinks he has a refill left.  No missed or extra doses.  Patient denies bleeding/bruising/swelling.  Has usual SOB.  No blood in urine or stool.  No dietary changes.  No changes in medication/OTC agents/herbals, except using Albuterol inhaler less often.  No change in alcohol use or tobacco use.  No change in activity level.  Patient denies falls.  No vomiting/diarrhea or acute illness.   No procedures scheduled in the future at this time.    Assessment:   Lab Results   Component Value Date    INR 2.80 (H) 01/17/2024    INR 3.00 (H) 12/20/2023    INR 3.10 (H) 11/27/2023     INR therapeutic - goal 2-3.  Recent Labs     01/17/24  1037   INR 2.80*        Plan:  POCT INR performed/result reviewed.  Continue PO Coumadin 5 mg MWF, 2.5 mg TThSS.  Recheck INR in 4 week(s).  Patient reminded to call the Anticoagulation Clinic with any signs or symptoms of bleeding or with any medication changes.  Patient given instructions utilizing the teach back method.       After visit summary printed and reviewed with patient.      Discharged ambulatory in no apparent distress, with male significant other.    For Pharmacy Admin Tracking Only    Time Spent (min): 20

## 2024-02-14 ENCOUNTER — HOSPITAL ENCOUNTER (OUTPATIENT)
Dept: PHARMACY | Age: 74
Setting detail: THERAPIES SERIES
Discharge: HOME | End: 2024-02-14
Payer: MEDICARE

## 2024-02-14 DIAGNOSIS — Z51.81 ENCOUNTER FOR THERAPEUTIC DRUG MONITORING: ICD-10-CM

## 2024-02-14 DIAGNOSIS — I48.0 PAF (PAROXYSMAL ATRIAL FIBRILLATION) (HCC): Primary | ICD-10-CM

## 2024-02-14 DIAGNOSIS — Z79.01 ANTICOAGULATED ON COUMADIN: ICD-10-CM

## 2024-02-14 LAB — POC INR: 2.5 (ref 0.8–1.2)

## 2024-02-14 PROCEDURE — 99212 OFFICE O/P EST SF 10 MIN: CPT

## 2024-02-14 PROCEDURE — 85610 PROTHROMBIN TIME: CPT

## 2024-02-14 PROCEDURE — 36416 COLLJ CAPILLARY BLOOD SPEC: CPT

## 2024-02-14 RX ORDER — WARFARIN SODIUM 5 MG/1
TABLET ORAL EVERY EVENING
COMMUNITY

## 2024-02-14 RX ORDER — WARFARIN SODIUM 5 MG/1
TABLET ORAL
Qty: 75 TABLET | Refills: 1 | Status: SHIPPED | OUTPATIENT
Start: 2024-02-14

## 2024-02-14 NOTE — PROGRESS NOTES
Medication Management Clinic  [facility]  Anticoagulation Clinic  [phone] (phone)  [phone] (fax)    Mr. Modesto López is a 73 y.o.  male with history of atrial fib., per Dr. Ingram's referral, who presents today for Warfarin monitoring and adjustment (4 week visit).    Patient verifies current dosing regimen and tablet strength.  No missed or extra doses.  Patient denies bleeding/bruising/swelling/SOB.  No blood in urine or stool.  No dietary changes.  No changes in medication/OTC agents/herbals, except using Albuterol inhaler less often.  No change in alcohol use or tobacco use.  No change in activity level.  Patient denies falls.  No vomiting/diarrhea or acute illness.   No procedures scheduled in the future at this time.    Assessment:     Lab Results   Component Value Date    INR 2.50 (H) 02/14/2024    INR 2.80 (H) 01/17/2024    INR 3.00 (H) 12/20/2023     INR therapeutic - goal 2-3.  Recent Labs     02/14/24  1027   INR 2.50*      Plan:  POCT INR performed/result reviewed.  Continue PO Coumadin 5 mg MWF, 2.5 mg TThSS.  Recheck INR in 4 week(s).  Patient reminded to call the Anticoagulation Clinic with any signs or symptoms of bleeding or with any medication changes.  Patient given instructions utilizing the teach back method.       After visit summary printed and reviewed with patient.      Discharged ambulatory in no apparent distress, with male significant other.  Prescription renewed electronically by clinic pharmacist.    For Pharmacy Admin Tracking Only    Intervention Detail: Refill(s) Provided  Total # of Interventions Recommended: 1  Total # of Interventions Accepted: 1  Time Spent (min):  21

## 2024-03-13 ENCOUNTER — HOSPITAL ENCOUNTER (OUTPATIENT)
Dept: PHARMACY | Age: 74
Setting detail: THERAPIES SERIES
Discharge: HOME | End: 2024-03-13
Payer: MEDICARE

## 2024-03-13 DIAGNOSIS — I48.0 PAF (PAROXYSMAL ATRIAL FIBRILLATION) (HCC): Primary | ICD-10-CM

## 2024-03-13 DIAGNOSIS — Z79.01 ANTICOAGULATED ON COUMADIN: ICD-10-CM

## 2024-03-13 DIAGNOSIS — Z51.81 ENCOUNTER FOR THERAPEUTIC DRUG MONITORING: ICD-10-CM

## 2024-03-13 LAB — POC INR: 2.8 (ref 0.8–1.2)

## 2024-03-13 PROCEDURE — 85610 PROTHROMBIN TIME: CPT

## 2024-03-13 PROCEDURE — 36416 COLLJ CAPILLARY BLOOD SPEC: CPT

## 2024-03-13 PROCEDURE — 99211 OFF/OP EST MAY X REQ PHY/QHP: CPT

## 2024-03-13 NOTE — PROGRESS NOTES
Medication Management Clinic  [facility]  Anticoagulation Clinic  [phone] (phone)  [phone] (fax)    Mr. Modesto López is a 73 y.o.  male with history of atrial fib., per Dr. Ingram's referral, who presents today for Warfarin monitoring and adjustment (4 week visit).    Patient verifies current dosing regimen and tablet strength.  No missed or extra doses.  Patient denies bleeding/bruising/swelling. Had some SOB with nasal congestion - gone now.  No blood in urine or stool.  No dietary changes.  Changes in medication/OTC agents/herbals: used Coricidin HBP Cold&Flu for nasal congestion (had box with him).  No change in alcohol use or tobacco use.  No change in activity level.  Patient denies falls.  No vomiting/diarrhea or acute illness.   No procedures scheduled in the future at this time.  Sees PCP tomorrow.    Assessment:   Lab Results   Component Value Date    INR 2.80 (H) 03/13/2024    INR 2.50 (H) 02/14/2024    INR 2.80 (H) 01/17/2024     INR therapeutic - goal 2-3.  Recent Labs     03/13/24  1018   INR 2.80*        Plan:  POCT INR performed/result reviewed.  Continue PO Coumadin 5 mg MWF, 2.5 mg TThSS.  Recheck INR in 4 week(s).  Patient reminded to call the Anticoagulation Clinic with any signs or symptoms of bleeding or with any medication changes.  Patient given instructions utilizing the teach back method.       After visit summary printed and reviewed with patient.      Discharged ambulatory in no apparent distress, with male significant other.     For Pharmacy Admin Tracking Only    Time Spent (min): 20

## 2024-04-10 ENCOUNTER — HOSPITAL ENCOUNTER (OUTPATIENT)
Dept: PHARMACY | Age: 74
Setting detail: THERAPIES SERIES
Discharge: HOME | End: 2024-04-10
Payer: MEDICARE

## 2024-04-10 DIAGNOSIS — Z51.81 ENCOUNTER FOR THERAPEUTIC DRUG MONITORING: ICD-10-CM

## 2024-04-10 DIAGNOSIS — I48.0 PAF (PAROXYSMAL ATRIAL FIBRILLATION) (HCC): Primary | ICD-10-CM

## 2024-04-10 DIAGNOSIS — Z79.01 ANTICOAGULATED ON COUMADIN: ICD-10-CM

## 2024-04-10 LAB — POC INR: 3.1 (ref 0.8–1.2)

## 2024-04-10 PROCEDURE — 99211 OFF/OP EST MAY X REQ PHY/QHP: CPT

## 2024-04-10 PROCEDURE — 36416 COLLJ CAPILLARY BLOOD SPEC: CPT

## 2024-04-10 PROCEDURE — 85610 PROTHROMBIN TIME: CPT

## 2024-04-10 NOTE — PROGRESS NOTES
Medication Management Clinic  [facility]  Anticoagulation Clinic  [phone] (phone)  [phone] (fax)    Mr. Modesto López is a 73 y.o.  male with history of atrial fib., per Dr. Ingram's referral, who presents today for Warfarin monitoring and adjustment (4 week visit).    Patient verifies current dosing regimen and tablet strength.  No missed or extra doses.  Patient denies bleeding/bruising.  Has usual SOB /swelling of legs.  No blood in urine or stool.  No dietary changes.  Changes in medication/OTC agents/Herbals: PCP ordered Z-ruben 3/14 for COVID.  No change in alcohol use or tobacco use.  No change in activity level.  Patient denies falls.  No vomiting/diarrhea or acute illness.   No procedures scheduled in the future at this time.    Assessment:     Lab Results   Component Value Date    INR 3.10 (H) 04/10/2024    INR 2.80 (H) 03/13/2024    INR 2.50 (H) 02/14/2024     INR supratherapeutic - goal 2-3.  Recent Labs     04/10/24  1027   INR 3.10*      Plan:  POCT INR performed/result reviewed.  2.5 mg today, W (per policy), then continue PO Coumadin 5 mg MWF, 2.5 mg TThSS.  Recheck INR in 3 week(s), per policy.  Patient reminded to call the Anticoagulation Clinic with any signs or symptoms of bleeding or with any medication changes.  Patient given instructions utilizing the teach back method.       After visit summary printed and reviewed with patient.    Advised extra caution.  Discharged ambulatory in no apparent distress, with male significant other.    For Pharmacy Admin Tracking Only    Intervention Detail: Dose Adjustment: 1, reason: Therapy De-escalation  Total # of Interventions Recommended: 1  Total # of Interventions Accepted: 1  Time Spent (min): 20       
not applicable

## 2024-05-01 ENCOUNTER — TELEPHONE (OUTPATIENT)
Dept: PHARMACY | Age: 74
End: 2024-05-01

## 2024-05-01 ENCOUNTER — HOSPITAL ENCOUNTER (OUTPATIENT)
Dept: PHARMACY | Age: 74
Setting detail: THERAPIES SERIES
Discharge: HOME | End: 2024-05-01
Payer: MEDICARE

## 2024-05-01 DIAGNOSIS — Z79.01 ANTICOAGULATED ON COUMADIN: ICD-10-CM

## 2024-05-01 DIAGNOSIS — I48.0 PAF (PAROXYSMAL ATRIAL FIBRILLATION) (HCC): Primary | ICD-10-CM

## 2024-05-01 DIAGNOSIS — Z51.81 ENCOUNTER FOR THERAPEUTIC DRUG MONITORING: ICD-10-CM

## 2024-05-01 LAB — POC INR: 2.3 (ref 0.8–1.2)

## 2024-05-01 PROCEDURE — 99211 OFF/OP EST MAY X REQ PHY/QHP: CPT

## 2024-05-01 PROCEDURE — 36416 COLLJ CAPILLARY BLOOD SPEC: CPT

## 2024-05-01 PROCEDURE — 85610 PROTHROMBIN TIME: CPT

## 2024-05-01 NOTE — TELEPHONE ENCOUNTER
Called former PCP's office to see who patient is assigned to - was told it wouldn't be known until he makes next appt.

## 2024-05-01 NOTE — PROGRESS NOTES
Medication Management Clinic  [facility]  Anticoagulation Clinic  [phone] (phone)  [phone] (fax)    Mr. Modesto López is a 73 y.o.  male with history of atrial fib., per Dr. Ingram's referral, who presents today for Warfarin monitoring and adjustment (3 week visit).    Patient verifies current dosing regimen and tablet strength.  No missed or extra doses.  Patient denies bleeding/bruising.  Has usual intermittent slight SOB.  Has usual slight swelling of lower legs.  No blood in urine or stool.  No dietary changes.  No changes in medication/OTC agents/herbals.  No change in alcohol use or tobacco use.  No change in activity level.  Patient denies falls.  No vomiting/diarrhea or acute illness.   No procedures scheduled in the future at this time.  IVÁN Daniels CNP left practice - patient does not know who new PCP is.    Assessment:     Lab Results   Component Value Date    INR 2.30 (H) 05/01/2024    INR 3.10 (H) 04/10/2024    INR 2.80 (H) 03/13/2024     INR therapeutic - goal 2-3.  Recent Labs     05/01/24  1024   INR 2.30*      Plan:  POCT INR performed/result reviewed.  Continue PO Coumadin 5 mg MWF, 2.5 mg TThSS.  Recheck INR in 4 week(s); friend, however, cannot bring patient until 6/3 (nearly 5 weeks).  Patient reminded to call the Anticoagulation Clinic with any signs or symptoms of bleeding or with any medication changes.  Patient given instructions utilizing the teach back method.       After visit summary printed and reviewed with patient.      Discharged ambulatory in no apparent distress, with male significant other.     For Pharmacy Admin Tracking Only    Time Spent (min): 20

## 2024-06-03 ENCOUNTER — HOSPITAL ENCOUNTER (OUTPATIENT)
Dept: PHARMACY | Age: 74
Setting detail: THERAPIES SERIES
Discharge: HOME | End: 2024-06-03
Payer: MEDICARE

## 2024-06-03 DIAGNOSIS — Z79.01 ANTICOAGULATED ON COUMADIN: ICD-10-CM

## 2024-06-03 DIAGNOSIS — I48.0 PAF (PAROXYSMAL ATRIAL FIBRILLATION) (HCC): Primary | ICD-10-CM

## 2024-06-03 DIAGNOSIS — Z51.81 ENCOUNTER FOR THERAPEUTIC DRUG MONITORING: ICD-10-CM

## 2024-06-03 LAB — POC INR: 2.4 (ref 0.8–1.2)

## 2024-06-03 PROCEDURE — 85610 PROTHROMBIN TIME: CPT

## 2024-06-03 PROCEDURE — 36416 COLLJ CAPILLARY BLOOD SPEC: CPT

## 2024-06-03 PROCEDURE — 99211 OFF/OP EST MAY X REQ PHY/QHP: CPT

## 2024-06-03 NOTE — PROGRESS NOTES
Medication Management Clinic  [facility]  Anticoagulation Clinic  [phone] (phone)  [phone] (fax)    Mr. Modesto López is a 73 y.o.  male with history of atrial fib., per Dr. Ingram's referral, who presents today for Warfarin monitoring and adjustment (5 week visit).    Patient verifies current dosing regimen and tablet strength.  No missed or extra doses.  Patient denies bleeding/bruising/swelling/SOB.  No blood in urine or stool.  No dietary changes.  No changes in medication/OTC agents/herbals.  No change in alcohol use or tobacco use.  No change in activity level.  Patient denies falls.  No vomiting/diarrhea or acute illness.   No procedures scheduled in the future at this time.  Does not yet know who new PCP will be. IVÁN shell.    Assessment:   Lab Results   Component Value Date    INR 2.40 (H) 06/03/2024    INR 2.30 (H) 05/01/2024    INR 3.10 (H) 04/10/2024     INR therapeutic - goal 2-3.  Recent Labs     06/03/24  1028   INR 2.40*        Plan:  POCT INR performed/result reviewed.  Continue PO Coumadin 5 mg MWF, 2.5 mg TThSS.  Recheck INR in 5 week(s).  Patient reminded to call the Anticoagulation Clinic with any signs or symptoms of bleeding or with any medication changes.  Patient given instructions utilizing the teach back method.     After visit summary printed and reviewed with patient.      Discharged ambulatory in no apparent distress, with male significant other.     For Pharmacy Admin Tracking Only    Time Spent (min): 20

## 2024-06-04 ENCOUNTER — OFFICE VISIT (OUTPATIENT)
Dept: UROLOGY | Age: 74
End: 2024-06-04
Payer: MEDICARE

## 2024-06-04 VITALS — RESPIRATION RATE: 22 BRPM | HEIGHT: 70 IN | BODY MASS INDEX: 33.93 KG/M2 | WEIGHT: 237 LBS

## 2024-06-04 DIAGNOSIS — R31.29 MICROSCOPIC HEMATURIA: ICD-10-CM

## 2024-06-04 DIAGNOSIS — N40.1 BENIGN PROSTATIC HYPERPLASIA WITH URINARY RETENTION: Primary | ICD-10-CM

## 2024-06-04 DIAGNOSIS — R33.8 BENIGN PROSTATIC HYPERPLASIA WITH URINARY RETENTION: Primary | ICD-10-CM

## 2024-06-04 LAB
BILIRUBIN, URINE: ABNORMAL
BLOOD URINE, POC: ABNORMAL
CHARACTER, URINE: ABNORMAL
COLOR: YELLOW
GLUCOSE URINE: NEGATIVE MG/DL
KETONES, URINE: NEGATIVE
LEUKOCYTE CLUMPS, URINE: NEGATIVE
NITRITE, URINE: NEGATIVE
PH, URINE: 5.5 (ref 5–9)
POST VOID RESIDUAL (PVR): 0 ML
PROTEIN, URINE: 30 MG/DL
SPECIFIC GRAVITY UA: 1.02 (ref 1–1.03)
UROBILINOGEN, URINE: 1 EU/DL (ref 0–1)

## 2024-06-04 PROCEDURE — 81003 URINALYSIS AUTO W/O SCOPE: CPT | Performed by: NURSE PRACTITIONER

## 2024-06-04 PROCEDURE — 99214 OFFICE O/P EST MOD 30 MIN: CPT | Performed by: NURSE PRACTITIONER

## 2024-06-04 PROCEDURE — 1123F ACP DISCUSS/DSCN MKR DOCD: CPT | Performed by: NURSE PRACTITIONER

## 2024-06-04 PROCEDURE — 51798 US URINE CAPACITY MEASURE: CPT | Performed by: NURSE PRACTITIONER

## 2024-06-04 RX ORDER — FINASTERIDE 5 MG/1
5 TABLET, FILM COATED ORAL DAILY
Qty: 90 TABLET | Refills: 3 | Status: SHIPPED | OUTPATIENT
Start: 2024-06-04

## 2024-06-04 RX ORDER — TAMSULOSIN HYDROCHLORIDE 0.4 MG/1
0.4 CAPSULE ORAL 2 TIMES DAILY
Qty: 180 CAPSULE | Refills: 3 | Status: SHIPPED | OUTPATIENT
Start: 2024-06-04

## 2024-06-04 ASSESSMENT — ENCOUNTER SYMPTOMS
BACK PAIN: 0
ABDOMINAL PAIN: 0
VOMITING: 0
NAUSEA: 0

## 2024-06-04 NOTE — PROGRESS NOTES
[facility] [place] [facility]  [facility] UROLOGY  [address]  [address]  [facility] [address]  Dept: [phone]  Loc: [phone]    Visit Date: 6/4/2024        HPI:     Modesto López is a 73 y.o. male who presents today for:  Chief Complaint   Patient presents with    Benign Prostatic Hypertrophy       HPI    Pt seen in follow up for BPH     Pt has a hx of BPH with urinary retention that improved on Flomax BID and finasteride 5 mg daily.       Cystoscopy was performed 8/11/2020 by  with findings of moderate lateral lobe hypertrophy of the prostate and bladder diverticulum on the dome.  CT urogram performed for micro hematuria noted bilateral nonobstructive nephrolithiasis and a hyperdense lesion at the superior pole of the right kidney measuring approximately 1.1 cms--? Possible hemorrhagic cyst.       Pt reported increased low back pain recently.  Renal US performed that was negative for hydronephrosis but noted indeterminate R renal lesion.  MRI abdomen with renal cysts without need for further imaging.      Current smoker.  Hx of gross hematuria.  Cystoscopy by  11/2023 with lateral lobe hypertrophy, moderately obstructing prostate, moderate bladder wall trabeculation, and no acute findings.      Current Outpatient Medications   Medication Sig Dispense Refill    warfarin (COUMADIN) 5 MG tablet Take as directed by [facility] (# 75 ~ 90 day supply) 75 tablet 1    finasteride (PROSCAR) 5 MG tablet TAKE 1 TABLET BY MOUTH ONCE A DAY 30 tablet 11    tamsulosin (FLOMAX) 0.4 MG capsule Take 1 capsule by mouth 2 times daily 180 capsule 3    ADVAIR DISKUS 250-50 MCG/ACT AEPB diskus inhaler INHALE ONE PUFF BY MOUTH EVERY TWELVE HOURS      acetaminophen (TYLENOL) 500 MG tablet Take 1 tablet by mouth every 6 hours as needed for Pain or Fever Don't take more than 3,000 mg each day, including what's in Coricidin.      dilTIAZem (CARDIZEM CD) 180 MG

## 2024-07-01 ENCOUNTER — TELEPHONE (OUTPATIENT)
Dept: CARDIOLOGY CLINIC | Age: 74
End: 2024-07-01

## 2024-07-01 DIAGNOSIS — I48.91 ATRIAL FIBRILLATION, UNSPECIFIED TYPE (HCC): Primary | ICD-10-CM

## 2024-07-01 NOTE — TELEPHONE ENCOUNTER
----- Message from Ashleigh Reeves RN sent at 7/1/2024 12:14 PM EDT -----  Please renew annual referral for Anticoagulation Monitoring, #[id number].  Thanks, SKYLER Reeves RN BSN

## 2024-07-09 ENCOUNTER — HOSPITAL ENCOUNTER (OUTPATIENT)
Dept: PHARMACY | Age: 74
Setting detail: THERAPIES SERIES
Discharge: HOME | End: 2024-07-09
Payer: MEDICARE

## 2024-07-09 DIAGNOSIS — Z51.81 ENCOUNTER FOR THERAPEUTIC DRUG MONITORING: ICD-10-CM

## 2024-07-09 DIAGNOSIS — Z79.01 ANTICOAGULATED ON COUMADIN: ICD-10-CM

## 2024-07-09 DIAGNOSIS — I48.0 PAF (PAROXYSMAL ATRIAL FIBRILLATION) (HCC): Primary | ICD-10-CM

## 2024-07-09 LAB — POC INR: 2.9 (ref 0.8–1.2)

## 2024-07-09 PROCEDURE — 99212 OFFICE O/P EST SF 10 MIN: CPT

## 2024-07-09 PROCEDURE — 36416 COLLJ CAPILLARY BLOOD SPEC: CPT

## 2024-07-09 PROCEDURE — 85610 PROTHROMBIN TIME: CPT

## 2024-07-09 RX ORDER — WARFARIN SODIUM 5 MG/1
TABLET ORAL
Qty: 75 TABLET | Refills: 1 | Status: SHIPPED | OUTPATIENT
Start: 2024-07-09

## 2024-07-09 NOTE — PROGRESS NOTES
Medication Management Clinic  [facility]  Anticoagulation Clinic  [phone] (phone)  [phone] (fax)    Mr. Modesto López is a 73 y.o.  male with history of atrial fib., per Dr. Ingram's referral, who presents today for Warfarin monitoring and adjustment (5 week visit).    Patient verifies current dosing regimen and tablet strength.  No missed or extra doses.  Patient denies bleeding/bruising/swelling.  Has usual SOB.  No blood in urine or stool.  No dietary changes.  Changes in medication/OTC agents/herbals: brought in bottle of Maia-tussin (new PCP is CHARI Avalos).  No change in alcohol use or tobacco use.  No change in activity level.  Patient denies falls.  No vomiting/diarrhea or acute illness.   No procedures scheduled in the future at this time.    Assessment:     Lab Results   Component Value Date    INR 2.90 (H) 07/09/2024    INR 2.40 (H) 06/03/2024    INR 2.30 (H) 05/01/2024     INR therapeutic - goal 2-3.  Recent Labs     07/09/24  1023   INR 2.90*      Plan:  POCT INR performed/result reviewed.  Continue PO Coumadin 5 mg MWF, 2.5 mg TThSS.  Recheck INR in 5 week(s).  Patient reminded to call the Anticoagulation Clinic with any signs or symptoms of bleeding or with any medication changes.  Patient given instructions utilizing the teach back method.       After visit summary printed and reviewed with patient.      Discharged ambulatory in no apparent distress, with male significant other.  Prescription renewed electronically by clinic pharmacist.    For Pharmacy Admin Tracking Only    Intervention Detail: Refill(s) Provided  Total # of Interventions Recommended: 1  Total # of Interventions Accepted: 1  Time Spent (min):  21

## 2024-08-14 ENCOUNTER — ANTI-COAG VISIT (OUTPATIENT)
Age: 74
End: 2024-08-14
Payer: MEDICARE

## 2024-08-14 DIAGNOSIS — Z79.01 ANTICOAGULATED ON COUMADIN: ICD-10-CM

## 2024-08-14 DIAGNOSIS — I48.0 PAF (PAROXYSMAL ATRIAL FIBRILLATION) (HCC): Primary | ICD-10-CM

## 2024-08-14 DIAGNOSIS — Z51.81 ENCOUNTER FOR THERAPEUTIC DRUG MONITORING: ICD-10-CM

## 2024-08-14 LAB — POC INR: 3 (ref 0.8–1.2)

## 2024-08-14 PROCEDURE — 99211 OFF/OP EST MAY X REQ PHY/QHP: CPT

## 2024-08-14 PROCEDURE — 85610 PROTHROMBIN TIME: CPT

## 2024-08-14 NOTE — PROGRESS NOTES
Medication Management Clinic  [facility]  Anticoagulation Clinic  [phone] (phone)  [phone] (fax)    Mr. Modesto López is a 73 y.o.  male with history of atrial fib., per Dr. Ingram's referral, who presents today for Warfarin monitoring and adjustment (5 week visit).    Patient verifies current dosing regimen and tablet strength.  No missed or extra doses.  Patient denies bleeding/swelling. Has usual SOB. Noted nearly-faded bruise LFA - had been cleaning mower deck.  No blood in urine or stool.  No dietary changes.  No changes in medication/OTC agents/herbals.  No change in alcohol use or tobacco use.  No change in activity level.  Patient denies falls.  No vomiting/diarrhea or acute illness.   No procedures scheduled in the future at this time.    Assessment:     Lab Results   Component Value Date    INR 3.00 (H) 08/14/2024    INR 2.90 (H) 07/09/2024    INR 2.40 (H) 06/03/2024     INR therapeutic - goal 2-3.  Recent Labs     08/14/24  1039   INR 3.00*      Plan:  POCT INR performed/result reviewed.  Continue PO Coumadin 5 mg MWF, 2.5 mg TThSS.  Recheck INR in 5 week(s).  Patient reminded to call the Anticoagulation Clinic with any signs or symptoms of bleeding or with any medication changes.  Patient given instructions utilizing the teach back method.       After visit summary printed and reviewed with patient.      Discharged ambulatory in no apparent distress, with male significant other.    For Pharmacy Admin Tracking Only    Time Spent (min): 20

## 2024-09-18 ENCOUNTER — ANTI-COAG VISIT (OUTPATIENT)
Age: 74
End: 2024-09-18
Payer: MEDICARE

## 2024-09-18 ENCOUNTER — HOSPITAL ENCOUNTER (OUTPATIENT)
Age: 74
Discharge: HOME | End: 2024-09-18
Payer: MEDICARE

## 2024-09-18 DIAGNOSIS — Z79.01 ANTICOAGULATED ON COUMADIN: Primary | ICD-10-CM

## 2024-09-18 DIAGNOSIS — Z79.01 ANTICOAGULATED ON COUMADIN: ICD-10-CM

## 2024-09-18 DIAGNOSIS — I48.0 PAF (PAROXYSMAL ATRIAL FIBRILLATION) (HCC): Primary | ICD-10-CM

## 2024-09-18 DIAGNOSIS — I48.0 PAF (PAROXYSMAL ATRIAL FIBRILLATION) (HCC): ICD-10-CM

## 2024-09-18 DIAGNOSIS — Z51.81 ENCOUNTER FOR THERAPEUTIC DRUG MONITORING: ICD-10-CM

## 2024-09-18 LAB
DEPRECATED RDW RBC AUTO: 49.8 FL (ref 35–45)
ERYTHROCYTE [DISTWIDTH] IN BLOOD BY AUTOMATED COUNT: 14.2 % (ref 11.5–14.5)
HCT VFR BLD AUTO: 43.4 % (ref 42–52)
HGB BLD-MCNC: 14.3 GM/DL (ref 14–18)
MCH RBC QN AUTO: 31.4 PG (ref 26–33)
MCHC RBC AUTO-ENTMCNC: 32.9 GM/DL (ref 32.2–35.5)
MCV RBC AUTO: 95.4 FL (ref 80–94)
PLATELET # BLD AUTO: 183 THOU/MM3 (ref 130–400)
PMV BLD AUTO: 10 FL (ref 9.4–12.4)
POC INR: 3 (ref 0.8–1.2)
RBC # BLD AUTO: 4.55 MILL/MM3 (ref 4.7–6.1)
WBC # BLD AUTO: 7.3 THOU/MM3 (ref 4.8–10.8)

## 2024-09-18 PROCEDURE — 85610 PROTHROMBIN TIME: CPT

## 2024-09-18 PROCEDURE — 85027 COMPLETE CBC AUTOMATED: CPT

## 2024-09-18 PROCEDURE — 99211 OFF/OP EST MAY X REQ PHY/QHP: CPT

## 2024-09-18 PROCEDURE — 36415 COLL VENOUS BLD VENIPUNCTURE: CPT

## 2024-10-09 NOTE — PATIENT INSTRUCTIONS
Your Provider for Today: Dr. Ingram  Your nurses for today: Isabel    You may receive a survey regarding the care you received during your visit.  Your input is valuable to us.  We encourage you to complete and return your survey.  We hope you will choose us in the future for your healthcare needs.

## 2024-10-10 ENCOUNTER — OFFICE VISIT (OUTPATIENT)
Dept: CARDIOLOGY CLINIC | Age: 74
End: 2024-10-10
Payer: MEDICARE

## 2024-10-10 VITALS
DIASTOLIC BLOOD PRESSURE: 84 MMHG | WEIGHT: 229.8 LBS | BODY MASS INDEX: 32.9 KG/M2 | SYSTOLIC BLOOD PRESSURE: 144 MMHG | HEIGHT: 70 IN | HEART RATE: 82 BPM

## 2024-10-10 DIAGNOSIS — I48.91 ATRIAL FIBRILLATION, UNSPECIFIED TYPE (HCC): Primary | ICD-10-CM

## 2024-10-10 PROCEDURE — 99214 OFFICE O/P EST MOD 30 MIN: CPT | Performed by: INTERNAL MEDICINE

## 2024-10-10 PROCEDURE — 93000 ELECTROCARDIOGRAM COMPLETE: CPT | Performed by: INTERNAL MEDICINE

## 2024-10-10 PROCEDURE — 3077F SYST BP >= 140 MM HG: CPT | Performed by: INTERNAL MEDICINE

## 2024-10-10 PROCEDURE — 3079F DIAST BP 80-89 MM HG: CPT | Performed by: INTERNAL MEDICINE

## 2024-10-10 PROCEDURE — 1123F ACP DISCUSS/DSCN MKR DOCD: CPT | Performed by: INTERNAL MEDICINE

## 2024-10-10 NOTE — PROGRESS NOTES
[facility] PROFESSIONAL SERVS  [facility] CARDIOLOGY  [address]  [address]  [facility] [address]  Dept: [phone]  Dept Fax: [phone]  Loc: [phone]      Visit Date: 10/10/2024    Mr. López is a 74 y.o. male  who presented for:  No chief complaint on file.      HPI:   75 yo M c hx of Afib on coumadin, CVA, HTN is here for a follow up.  Patient currently smokes, 1 ppd. Doing well.  Denies any chest pain, sob, palpitations, lightheadedness, dizziness, orthopnea, PND or pedal edema.         Current Outpatient Medications:     guaiFENesin (MARILU-TUSSIN PO), Take 20 mLs by mouth every 4 hours as needed Indications: Cough (Patient not taking: Reported on 9/18/2024), Disp: , Rfl:     warfarin (COUMADIN) 5 MG tablet, Take as directed by [facility] (# 75 ~ 90 day supply), Disp: 75 tablet, Rfl: 1    tamsulosin (FLOMAX) 0.4 MG capsule, Take 1 capsule by mouth 2 times daily, Disp: 180 capsule, Rfl: 3    finasteride (PROSCAR) 5 MG tablet, Take 1 tablet by mouth daily, Disp: 90 tablet, Rfl: 3    Chlorpheniramine-Acetaminophen (CORICIDIN HBP COLD/FLU PO), Take by mouth See Admin Instructions Indications: Cold Symptoms, Stuffy Nose (Patient not taking: Reported on 5/1/2024), Disp: , Rfl:     ADVAIR DISKUS 250-50 MCG/ACT AEPB diskus inhaler, INHALE ONE PUFF BY MOUTH EVERY TWELVE HOURS, Disp: , Rfl:     acetaminophen (TYLENOL) 500 MG tablet, Take 1 tablet by mouth every 6 hours as needed for Pain or Fever Don't take more than 3,000 mg each day, including what's in Coricidin. (Patient not taking: Reported on 7/9/2024), Disp: , Rfl:     dilTIAZem (CARDIZEM CD) 180 MG extended release capsule, Take 1 capsule by mouth daily, Disp: 90 capsule, Rfl: 2    albuterol sulfate HFA (PROVENTIL;VENTOLIN;PROAIR) 108 (90 Base) MCG/ACT inhaler, Inhale 2 puffs into the lungs every 6 hours as needed for Wheezing, Disp: , Rfl:     LINZESS 145 MCG capsule, Take by mouth See Admin Instructions

## 2024-10-23 ENCOUNTER — ANTI-COAG VISIT (OUTPATIENT)
Age: 74
End: 2024-10-23
Payer: MEDICARE

## 2024-10-23 DIAGNOSIS — Z51.81 ENCOUNTER FOR THERAPEUTIC DRUG MONITORING: ICD-10-CM

## 2024-10-23 DIAGNOSIS — Z79.01 ANTICOAGULATED ON COUMADIN: ICD-10-CM

## 2024-10-23 DIAGNOSIS — I48.0 PAF (PAROXYSMAL ATRIAL FIBRILLATION) (HCC): Primary | ICD-10-CM

## 2024-10-23 LAB — POC INR: 2.8 (ref 0.8–1.2)

## 2024-10-23 PROCEDURE — 99211 OFF/OP EST MAY X REQ PHY/QHP: CPT

## 2024-10-23 PROCEDURE — 85610 PROTHROMBIN TIME: CPT

## 2024-10-23 NOTE — PROGRESS NOTES
Medication Management Clinic  [facility]  Anticoagulation Clinic  [phone] (phone)  [phone] (fax)    Mr. Modesto López is a 74 y.o.  male with history of atrial fib., per Dr. Ingram's referral, who presents today for Warfarin monitoring and adjustment (5 week visit).    Patient verifies current dosing regimen and tablet strength.  No missed or extra doses.  Patient denies bleeding/bruising/swelling/SOB.  No blood in urine or stool.  No dietary changes.  No changes in medication/OTC agents/herbals.  No change in alcohol use or tobacco use.  No change in activity level.  Patient denies falls.  No vomiting/diarrhea or acute illness.   No procedures scheduled in the future at this time.    Assessment:     Lab Results   Component Value Date    INR 2.80 (H) 10/23/2024    INR 3.00 (H) 09/18/2024    INR 3.00 (H) 08/14/2024     INR therapeutic - goal 2-3.  Recent Labs     10/23/24  1035   INR 2.80*      Plan:  POCT INR performed/result reviewed.  Continue PO Coumadin 5 mg MWF, 2.5 mg TThSS.  Recheck INR in 5 week(s).  Patient reminded to call the Anticoagulation Clinic with any signs or symptoms of bleeding or with any medication changes.  Patient given instructions utilizing the teach back method.       After visit summary printed and reviewed with patient.      Discharged ambulatory in no apparent distress, with male significant other.     For Pharmacy Admin Tracking Only    Time Spent (min): 20

## 2024-11-11 NOTE — PROGRESS NOTES
::  PHYSICAL THERAPY - MEDICARE DAILY TREATMENT NOTE (updated 3/23)      Date: 2024          Patient Name:  Rosalinda Elliott :  1946   Medical   Diagnosis:  Adhesive capsulitis of right shoulder [M75.01] Treatment Diagnosis:  M25.511  RIGHT SHOULDER PAIN    Referral Source:  Lizette Victor MD Insurance:   Payor: [employer] / Plan: [facility] COMPLETE HMO / Product Type: *No Product type* /                     Patient  verified yes     Visit #   Current  / Total 8 20   Time   In / Out 9:15A 9:58A   Total Treatment Time 43   Total Timed Codes 43   1:1 Treatment Time 38      [facility] Totals Reminder:  bill using total billable   min of TIMED therapeutic procedures and modalities.   8-22 min = 1 unit; 23-37 min = 2 units; 38-52 min = 3 units; 53-67 min = 4 units; 68-82 min = 5 units            SUBJECTIVE    Pain Level (0-10 scale): 4/10    Any medication changes, allergies to medications, adverse drug reactions, diagnosis change, or new procedure performed?: [x] No    [] Yes (see summary sheet for update)  Medications: Verified on Patient Summary List    Subjective functional status/changes:     Pt reports her shoulder continues to bother her at night. No improvement noted.     OBJECTIVE      Therapeutic Procedures:  Tx Min Billable or 1:1 Min (if diff from Tx Min) Procedure, Rationale, Specifics   35 30 [id number] Therapeutic Exercise (timed):  increase ROM, strength, coordination, balance, and proprioception to improve patient's ability to progress to PLOF and address remaining functional goals. (see flow sheet as applicable)     Details if applicable:  PROM R shoulder flexion and ER; assessment of current status   8 8 17676 Neuromuscular Re-Education (timed):  improve balance, coordination, kinesthetic sense, posture, core stability and proprioception to improve patient's ability to develop conscious control of individual muscles and awareness of position of extremities in order to progress  Medication Management [address]  [phone] (phone)  [phone] (fax)      Mr. Jeff Mary is a 78 y.o.  male with history of paroxysmal atrial fib. , per Dr. Rubina Cherry referral, who presents today for Warfarin monitoring and adjustment (4 week visit). Patient verifies current dosing regimen and tablet strength. No missed or extra doses. Patient denies bleeding/SOB/chest pain. Has usual left leg swelling/easy bruising. No blood in urine or stool. No dietary changes. No changes in medication/OTC agents/herbals. Patient thought Flomax and Proscar new, but was on before last visit. No change in alcohol use or tobacco use. No change in activity level. Patient denies headaches/dizziness/lightheadedness/falls. No vomiting/diarrhea or acute illness. No procedures scheduled in the future at this time. Assessment:   Lab Results   Component Value Date    INR 2.70 (H) 09/03/2020    INR 2.30 (H) 08/05/2020    INR 1.79 (H) 07/14/2020     INR therapeutic - goal 2-3. Recent Labs     09/03/20  1127   INR 2.70*     Plan:  POCT INR ordered/performed/result reviewed. Continue PO Coumadin 5 mg MWF, 2.5 mg TThSS. Recheck INR in 4 week(s). Patient reminded to call the Anticoagulation Clinic with any signs or symptoms of bleeding or with any medication changes. Patient given instructions utilizing the teach back method. Discharged ambulatory in no apparent distress, wearing mask, with male significant other. Just before he left, patient handed over 9/1 office note from PCP (made copy and given to pharmacy tech. to scan). Compared pill list: no Aspirin on that list, and Lidocaine and Nicotine patches appear to be new. Will call POA to verify (did call her later - stated is taking Aspirin, doesn't have Lidocaine patch yet, and not using Nicotine patch). After visit summary printed and reviewed with patient/significant other.       Medications reviewed and updated on home medication list.    Influenza vaccine:     [] given    [] declined   [] received previously   [] plans to receive at a later time   [] refused    [] documented in EPIC

## 2024-11-25 ENCOUNTER — ANTI-COAG VISIT (OUTPATIENT)
Age: 74
End: 2024-11-25
Payer: MEDICARE

## 2024-11-25 DIAGNOSIS — Z51.81 ENCOUNTER FOR THERAPEUTIC DRUG MONITORING: ICD-10-CM

## 2024-11-25 DIAGNOSIS — Z79.01 ANTICOAGULATED ON COUMADIN: ICD-10-CM

## 2024-11-25 DIAGNOSIS — I48.0 PAF (PAROXYSMAL ATRIAL FIBRILLATION) (HCC): Primary | ICD-10-CM

## 2024-11-25 LAB — POC INR: 2.3 (ref 0.8–1.2)

## 2024-11-25 PROCEDURE — 85610 PROTHROMBIN TIME: CPT

## 2024-11-25 PROCEDURE — 99211 OFF/OP EST MAY X REQ PHY/QHP: CPT

## 2024-11-25 NOTE — PROGRESS NOTES
Medication Management Clinic  [facility]  Anticoagulation Clinic  [phone] (phone)  [phone] (fax)    Mr. Modesto López is a 74 y.o.  male with history of atrial fib., per Dr. Ingram's referral, who presents today for Warfarin monitoring and adjustment (5 week visit).    Patient verifies current dosing regimen and tablet strength.  No missed or extra doses.  Patient denies bleeding/bruising/swelling. Has usual SOB.  No blood in urine or stool.  No dietary changes.  No changes in medication/OTC agents/herbals.  No change in alcohol use or tobacco use.  No change in activity level.  Patient denies falls.  No vomiting/diarrhea or acute illness.   No procedures scheduled in the future at this time.    Assessment:   Lab Results   Component Value Date    INR 2.30 (H) 11/25/2024    INR 2.80 (H) 10/23/2024    INR 3.00 (H) 09/18/2024     INR therapeutic - goal 2-3.  Recent Labs     11/25/24  1024   INR 2.30*      Plan:  POCT INR performed/result reviewed.  Continue PO Coumadin 5 mg MWF, 2.5 mg TThSS.  Recheck INR in 6 week(s).  Patient reminded to call the Anticoagulation Clinic with any signs or symptoms of bleeding or with any medication changes.  Patient given instructions utilizing the teach back method.       After visit summary printed and reviewed with patient.      Discharged ambulatory in no apparent distress, with male significant other.     For Pharmacy Admin Tracking Only    Time Spent (min): 20

## 2025-01-06 ENCOUNTER — APPOINTMENT (OUTPATIENT)
Age: 75
End: 2025-01-06
Payer: MEDICARE

## 2025-01-08 ENCOUNTER — ANTI-COAG VISIT (OUTPATIENT)
Age: 75
End: 2025-01-08
Payer: MEDICARE

## 2025-01-08 DIAGNOSIS — I48.0 PAF (PAROXYSMAL ATRIAL FIBRILLATION) (HCC): Primary | ICD-10-CM

## 2025-01-08 DIAGNOSIS — Z79.01 ANTICOAGULATED ON COUMADIN: ICD-10-CM

## 2025-01-08 DIAGNOSIS — Z51.81 ENCOUNTER FOR THERAPEUTIC DRUG MONITORING: ICD-10-CM

## 2025-01-08 LAB — POC INR: 2.1 (ref 0.8–1.2)

## 2025-01-08 PROCEDURE — 99211 OFF/OP EST MAY X REQ PHY/QHP: CPT

## 2025-01-08 PROCEDURE — 85610 PROTHROMBIN TIME: CPT

## 2025-01-08 NOTE — PROGRESS NOTES
Medication Management Clinic  [facility]  Anticoagulation Clinic  [phone] (phone)  [phone] (fax)    Mr. Modesto López is a 74 y.o.  male with history of atrial fib., per Dr. Ingram's referral, who presents today for Warfarin monitoring and adjustment (6 week visit).    Patient verifies current dosing regimen and tablet strength. Thinks he just got a refill.  No missed or extra doses.  Patient denies bleeding/bruising/swelling/SOB.  No blood in urine or stool.  No dietary changes.  No changes in medication/OTC agents/herbals.  No change in alcohol use or tobacco use.  No change in activity level.  Patient denies falls.  No vomiting/diarrhea or acute illness.   No procedures scheduled in the future at this time.    Assessment:   Lab Results   Component Value Date    INR 2.10 (H) 01/08/2025    INR 2.30 (H) 11/25/2024    INR 2.80 (H) 10/23/2024     INR therapeutic - goal 2-3.  Recent Labs     01/08/25  1459   INR 2.10*        Plan:  POCT INR performed/result reviewed.  Continue PO Coumadin 5 mg MWF, 2.5 mg TThSS.  Recheck INR in 6 week(s).  Patient reminded to call the Anticoagulation Clinic with any signs or symptoms of bleeding or with any medication changes.  Patient given instructions utilizing the teach back method.       After visit summary printed and reviewed with patient.      Discharged ambulatory in no apparent distress, with male friend.     For Pharmacy Admin Tracking Only    Time Spent (min):  21

## 2025-02-19 ENCOUNTER — ANTI-COAG VISIT (OUTPATIENT)
Age: 75
End: 2025-02-19
Payer: MEDICARE

## 2025-02-19 DIAGNOSIS — I48.0 PAF (PAROXYSMAL ATRIAL FIBRILLATION) (HCC): Primary | ICD-10-CM

## 2025-02-19 DIAGNOSIS — Z51.81 ENCOUNTER FOR THERAPEUTIC DRUG MONITORING: ICD-10-CM

## 2025-02-19 DIAGNOSIS — Z79.01 ANTICOAGULATED ON COUMADIN: ICD-10-CM

## 2025-02-19 LAB — POC INR: 1.8 (ref 0.8–1.2)

## 2025-02-19 PROCEDURE — 99212 OFFICE O/P EST SF 10 MIN: CPT

## 2025-02-19 PROCEDURE — 85610 PROTHROMBIN TIME: CPT

## 2025-02-19 RX ORDER — WARFARIN SODIUM 5 MG/1
TABLET ORAL
Qty: 75 TABLET | Refills: 1 | Status: SHIPPED | OUTPATIENT
Start: 2025-02-19

## 2025-02-19 NOTE — PROGRESS NOTES
Medication Management Clinic  [facility]  Anticoagulation Clinic  [phone] (phone)  [phone] (fax)    Mr. Modesto López is a 74 y.o.  male with history of atrial fib., per Dr. Ingram's referral, who presents today for Warfarin monitoring and adjustment (6 week visit).    Patient verifies current dosing regimen and tablet strength.  No missed or extra doses.  Patient denies bleeding/bruising/swelling/SOB.  No blood in urine or stool.  No dietary changes.  No changes in medication/OTC agents/herbals.  No change in alcohol use or tobacco use.  No change in activity level, except for burst of activity with recent snow removal.  Patient denies falls.  No vomiting/diarrhea or acute illness.   No procedures scheduled in the future at this time.      Assessment:   Lab Results   Component Value Date    INR 1.80 (H) 02/19/2025    INR 2.10 (H) 01/08/2025    INR 2.30 (H) 11/25/2024     INR subtherapeutic - goal 2-3.  Recent Labs     02/19/25  1027   INR 1.80*        Plan:  POCT INR performed/result reviewed.  7.5 mg today, W (per policy), then continue PO Coumadin 5 mg MWF, 2.5 mg TThSS.  Recheck INR in 3 week(s), per policy.  Patient reminded to call the Anticoagulation Clinic with any signs or symptoms of bleeding or with any medication changes.  Patient given instructions utilizing the teach back method.     Refill sent electronically under referring provider, per clinic pharmacist (explained why to patient).    After visit summary printed and reviewed with patient.      Discharged ambulatory in no apparent distress, with male significant other.    For Pharmacy Admin Tracking Only    Intervention Detail: Dose Adjustment: 1, reason: Therapy Optimization and Refill(s) Provided  Total # of Interventions Recommended: 2  Total # of Interventions Accepted: 2  Time Spent (min):  21

## 2025-03-12 ENCOUNTER — ANTI-COAG VISIT (OUTPATIENT)
Age: 75
End: 2025-03-12
Payer: MEDICARE

## 2025-03-12 DIAGNOSIS — Z79.01 ANTICOAGULATED ON COUMADIN: ICD-10-CM

## 2025-03-12 DIAGNOSIS — I48.0 PAF (PAROXYSMAL ATRIAL FIBRILLATION) (HCC): Primary | ICD-10-CM

## 2025-03-12 DIAGNOSIS — Z51.81 ENCOUNTER FOR THERAPEUTIC DRUG MONITORING: ICD-10-CM

## 2025-03-12 LAB — POC INR: 2.1 (ref 0.8–1.2)

## 2025-03-12 PROCEDURE — 85610 PROTHROMBIN TIME: CPT

## 2025-03-12 PROCEDURE — 99211 OFF/OP EST MAY X REQ PHY/QHP: CPT

## 2025-03-12 NOTE — PROGRESS NOTES
Medication Management Clinic  [facility]  Anticoagulation Clinic  [phone] (phone)  [phone] (fax)    Mr. Modesto López is a 74 y.o.  male with history of atrial fib., per Dr. Ingram's referral, who presents today for Warfarin monitoring and adjustment (3 week visit).    Patient verifies current dosing regimen and tablet strength.  No missed or extra doses, except for bolus ordered last visit.  Patient denies bleeding/bruising/swelling.  Has usual slight SOB.  No blood in urine or stool.  No dietary changes.  No changes in medication/OTC agents/herbals.  No change in alcohol use or tobacco use.  No change in activity level.  Patient denies falls.  No vomiting/diarrhea or acute illness.   No procedures scheduled in the future at this time.      Assessment:     Lab Results   Component Value Date    INR 2.10 (H) 03/12/2025    INR 1.80 (H) 02/19/2025    INR 2.10 (H) 01/08/2025     INR therapeutic - goal 2-3.  Recent Labs     03/12/25  1016   INR 2.10*      Plan:  POCT INR performed/result reviewed.  Continue PO Coumadin 5 mg MWF, 2.5 mg TThSS.  Recheck INR in 6 week(s).  Patient reminded to call the Anticoagulation Clinic with any signs or symptoms of bleeding or with any medication changes.  Patient given instructions utilizing the teach back method.   After visit summary printed and reviewed with patient.      Discharged ambulatory in no apparent distress, with male significant other.    For Pharmacy Admin Tracking Only    Time Spent (min): 20

## 2025-04-23 ENCOUNTER — ANTI-COAG VISIT (OUTPATIENT)
Age: 75
End: 2025-04-23
Payer: MEDICARE

## 2025-04-23 DIAGNOSIS — Z79.01 ANTICOAGULATED ON COUMADIN: ICD-10-CM

## 2025-04-23 DIAGNOSIS — I48.0 PAF (PAROXYSMAL ATRIAL FIBRILLATION) (HCC): Primary | ICD-10-CM

## 2025-04-23 DIAGNOSIS — Z51.81 ENCOUNTER FOR THERAPEUTIC DRUG MONITORING: ICD-10-CM

## 2025-04-23 LAB — POC INR: 2.3 (ref 0.8–1.2)

## 2025-04-23 PROCEDURE — 85610 PROTHROMBIN TIME: CPT

## 2025-04-23 PROCEDURE — 99211 OFF/OP EST MAY X REQ PHY/QHP: CPT

## 2025-04-23 NOTE — PROGRESS NOTES
Medication Management Clinic  [facility]  Anticoagulation Clinic  [phone] (phone)  [phone] (fax)    Mr. Modesto López is a 74 y.o.  male with history of atrial fib., per Dr. Ingram's referral, who presents today for Warfarin monitoring and adjustment (6 week visit).    Patient verifies current dosing regimen and tablet strength.  No missed or extra doses.  Patient denies bleeding.  Has usual easy bruising.  Has usual slight SOB.  Has usual minor intermittent lower extremity swelling bilat.  No blood in urine or stool.  No dietary changes.  No changes in medication/OTC agents/herbals.  No change in alcohol use or tobacco use.  No change in activity level.  Patient denies falls.  No vomiting/diarrhea or acute illness.   No procedures scheduled in the future at this time.      Assessment:       INR goal: 2.0-3.0  Lab Results   Component Value Date    INR 2.30 (H) 04/23/2025    INR 2.10 (H) 03/12/2025    INR 1.80 (H) 02/19/2025     INR therapeutic   Recent Labs     04/23/25  1036   INR 2.30*      Plan:  POCT INR performed/result reviewed.  Continue PO Coumadin 5 mg MWF, 2.5 mg TThSS.  Recheck INR in 6 week(s).  Patient reminded to call the Anticoagulation Clinic with any signs or symptoms of bleeding or with any medication changes.  Patient given instructions utilizing the teach back method.     After visit summary printed and reviewed with patient.      Discharged ambulatory in no apparent distress, with male significant other.    For Pharmacy Admin Tracking Only    Time Spent (min): 20

## 2025-05-30 RX ORDER — TAMSULOSIN HYDROCHLORIDE 0.4 MG/1
0.4 CAPSULE ORAL 2 TIMES DAILY
Qty: 180 CAPSULE | Refills: 3 | Status: SHIPPED | OUTPATIENT
Start: 2025-05-30

## 2025-05-30 NOTE — TELEPHONE ENCOUNTER
Modesto López called requesting a refill on the following medications:  Requested Prescriptions     Pending Prescriptions Disp Refills    tamsulosin (FLOMAX) 0.4 MG capsule [Pharmacy Med Name: tamsulosin 0.4 mg capsule] 180 capsule 3     Sig: TAKE 1 CAPSULE BY MOUTH TWICE DAILY     Pharmacy verified:  [email or web address]      Date of last visit: 06/04/2024  Date of next visit (if applicable): 6/12/2025

## 2025-06-04 ENCOUNTER — ANTI-COAG VISIT (OUTPATIENT)
Age: 75
End: 2025-06-04
Payer: MEDICARE

## 2025-06-04 DIAGNOSIS — Z51.81 ENCOUNTER FOR THERAPEUTIC DRUG MONITORING: ICD-10-CM

## 2025-06-04 DIAGNOSIS — I48.0 PAF (PAROXYSMAL ATRIAL FIBRILLATION) (HCC): Primary | ICD-10-CM

## 2025-06-04 DIAGNOSIS — Z79.01 ANTICOAGULATED ON COUMADIN: ICD-10-CM

## 2025-06-04 LAB — POC INR: 2.5 (ref 0.8–1.2)

## 2025-06-04 PROCEDURE — 85610 PROTHROMBIN TIME: CPT

## 2025-06-04 PROCEDURE — 99211 OFF/OP EST MAY X REQ PHY/QHP: CPT

## 2025-06-04 RX ORDER — BUDESONIDE AND FORMOTEROL FUMARATE DIHYDRATE 160; 4.5 UG/1; UG/1
2 AEROSOL RESPIRATORY (INHALATION) 2 TIMES DAILY
COMMUNITY
Start: 2025-05-12

## 2025-06-04 NOTE — PROGRESS NOTES
Medication Management Clinic  [facility]  Anticoagulation Clinic  [phone] (phone)  [phone] (fax)    Mr. Modesto López is a 74 y.o.  male with history of atrial fib., per Dr. Ingram's referral, who presents today for Warfarin monitoring and adjustment (6 week visit).    Patient verifies current dosing regimen and tablet strength.  No missed or extra doses.  Patient denies bleeding/swelling.  Has usual minor SOB/easy bruising.  No blood in urine or stool.  No dietary changes.  No changes in medication/OTC agents/herbals, except thinks Advair was changed to Symbicort last month.  No change in alcohol use or tobacco use.  No change in activity level.  Patient denies falls.  No vomiting/diarrhea or acute illness.   No procedures scheduled in the future at this time.      Assessment:       INR goal: 2.0-3.0  Lab Results   Component Value Date    INR 2.50 (H) 06/04/2025    INR 2.30 (H) 04/23/2025    INR 2.10 (H) 03/12/2025     INR therapeutic   Recent Labs     06/04/25  1023   INR 2.50*      Plan:  POCT INR performed/result reviewed.  Continue PO Coumadin 5 mg MWF, 2.5 mg TThSS.  Recheck INR in 6 week(s).  Patient reminded to call the Anticoagulation Clinic with any signs or symptoms of bleeding or with any medication changes.  Patient given instructions utilizing the teach back method.     After visit summary printed and reviewed with patient.      Discharged ambulatory in no apparent distress, with male significant other.    For Pharmacy Admin Tracking Only    Time Spent (min): 20

## 2025-06-12 ENCOUNTER — OFFICE VISIT (OUTPATIENT)
Dept: UROLOGY | Age: 75
End: 2025-06-12
Payer: MEDICARE

## 2025-06-12 VITALS — HEIGHT: 70 IN | WEIGHT: 233.3 LBS | BODY MASS INDEX: 33.4 KG/M2

## 2025-06-12 DIAGNOSIS — N40.1 BENIGN PROSTATIC HYPERPLASIA WITH URINARY RETENTION: Primary | ICD-10-CM

## 2025-06-12 DIAGNOSIS — R33.8 BENIGN PROSTATIC HYPERPLASIA WITH URINARY RETENTION: Primary | ICD-10-CM

## 2025-06-12 DIAGNOSIS — R31.29 MICROSCOPIC HEMATURIA: ICD-10-CM

## 2025-06-12 LAB
BILIRUBIN, URINE: ABNORMAL
BLOOD URINE, POC: ABNORMAL
CHARACTER, URINE: CLEAR
COLOR, UA: YELLOW
GLUCOSE URINE: NEGATIVE MG/DL
KETONES, URINE: 15
LEUKOCYTE CLUMPS, URINE: ABNORMAL
NITRITE, URINE: NEGATIVE
PH, URINE: 5.5 (ref 5–9)
POST VOID RESIDUAL (PVR): 19 ML
PROTEIN, URINE: 100 MG/DL
SPECIFIC GRAVITY UA: 1.02 (ref 1–1.03)
UROBILINOGEN, URINE: 1 EU/DL (ref 0–1)

## 2025-06-12 PROCEDURE — 51798 US URINE CAPACITY MEASURE: CPT | Performed by: NURSE PRACTITIONER

## 2025-06-12 PROCEDURE — 99214 OFFICE O/P EST MOD 30 MIN: CPT | Performed by: NURSE PRACTITIONER

## 2025-06-12 PROCEDURE — 1123F ACP DISCUSS/DSCN MKR DOCD: CPT | Performed by: NURSE PRACTITIONER

## 2025-06-12 PROCEDURE — 81003 URINALYSIS AUTO W/O SCOPE: CPT | Performed by: NURSE PRACTITIONER

## 2025-06-12 PROCEDURE — 1159F MED LIST DOCD IN RCRD: CPT | Performed by: NURSE PRACTITIONER

## 2025-06-12 RX ORDER — FINASTERIDE 5 MG/1
5 TABLET, FILM COATED ORAL DAILY
Qty: 90 TABLET | Refills: 3 | Status: SHIPPED | OUTPATIENT
Start: 2025-06-12

## 2025-06-12 ASSESSMENT — ENCOUNTER SYMPTOMS
NAUSEA: 0
BACK PAIN: 0
ABDOMINAL PAIN: 0
VOMITING: 0

## 2025-06-12 NOTE — PROGRESS NOTES
recent PSA values are as follows  Lab Results   Component Value Date    PSA 1.34 (H) 03/04/2020        Recent BUN/Creatinine:  Lab Results   Component Value Date/Time    BUN 15 11/18/2019 10:42 AM    CREATININE 0.8 11/18/2019 10:42 AM         Assessment:   BPH with LUTs  Bilateral renal cysts  Bilateral nonobstructive nephrolithiasis  Bilateral low back pain  Scrotal sebaceous cyst  Current tobacco user  Hx CVA  Hx gross hematuria    Plan:     Pt reports LUTS stable.  Continue finasteride and flomax.  Refills sent to pharmacy.       Send urine for cytology.     PSA now--call results     F/u in 1 year with PVR

## 2025-06-16 ENCOUNTER — RESULTS FOLLOW-UP (OUTPATIENT)
Dept: UROLOGY | Age: 75
End: 2025-06-16

## 2025-06-24 ENCOUNTER — TELEPHONE (OUTPATIENT)
Dept: CARDIOLOGY CLINIC | Age: 75
End: 2025-06-24

## 2025-06-24 DIAGNOSIS — R06.02 SOB (SHORTNESS OF BREATH): ICD-10-CM

## 2025-06-24 DIAGNOSIS — I48.91 ATRIAL FIBRILLATION, UNSPECIFIED TYPE (HCC): Primary | ICD-10-CM

## 2025-07-14 ENCOUNTER — ANTI-COAG VISIT (OUTPATIENT)
Age: 75
End: 2025-07-14
Payer: MEDICARE

## 2025-07-14 DIAGNOSIS — I48.0 PAF (PAROXYSMAL ATRIAL FIBRILLATION) (HCC): Primary | ICD-10-CM

## 2025-07-14 DIAGNOSIS — R06.02 SOB (SHORTNESS OF BREATH): ICD-10-CM

## 2025-07-14 DIAGNOSIS — Z51.81 ENCOUNTER FOR THERAPEUTIC DRUG MONITORING: ICD-10-CM

## 2025-07-14 DIAGNOSIS — Z79.01 ANTICOAGULATED ON COUMADIN: ICD-10-CM

## 2025-07-14 LAB — POC INR: 2.6 (ref 0.8–1.2)

## 2025-07-14 PROCEDURE — 99212 OFFICE O/P EST SF 10 MIN: CPT

## 2025-07-14 PROCEDURE — 85610 PROTHROMBIN TIME: CPT

## 2025-07-14 RX ORDER — WARFARIN SODIUM 5 MG/1
TABLET ORAL
Qty: 75 TABLET | Refills: 1 | Status: SHIPPED | OUTPATIENT
Start: 2025-07-14

## 2025-07-14 NOTE — PROGRESS NOTES
Medication Management Clinic  [facility]  Anticoagulation Clinic  [phone] (phone)  [phone] (fax)    Mr. Modesto López is a 74 y.o.  male with history of SOB/atrial fib., per Dr. Ingram's referral, who presents today for Warfarin monitoring and adjustment (6 week visit).    Patient verifies current dosing regimen and tablet strength.  No missed or extra doses.  Patient denies swelling. Had some blood on tissue twice after blowing nose. Has had some worsening SOB with humid weather. Noted fading bruise left antecubital from a blood draw.  No blood in urine or stool.  No dietary changes.  No changes in medication/OTC agents/herbals. Unsure which inhaler he's using.  Symbicort on list from PCP (also on this clinic's list).  May have gone back to Advair - one of them made him dizzy.  No change in alcohol use or tobacco use.  No change in activity level.  Patient denies falls.  No vomiting/diarrhea or acute illness.   No procedures scheduled in the future at this time.      Assessment:       INR goal: 2.0-3.0  Lab Results   Component Value Date    INR 2.60 (H) 07/14/2025    INR 2.50 (H) 06/04/2025    INR 2.30 (H) 04/23/2025     INR therapeutic   Recent Labs     07/14/25  1020   INR 2.60*      Plan:  POCT INR performed/result reviewed.  Continue PO Coumadin 5 mg MWF, 2.5 mg TThSS.  Recheck INR in 6 week(s).  Patient reminded to call the Anticoagulation Clinic with any signs or symptoms of bleeding or with any medication changes.  Patient given instructions utilizing the teach back method.     Patient had PCP's visit note from 7/9/25 with him.  RMA will scan in.  Refill sent electronically per CPA (under referring provider) per clinic pharmacist.  After visit summary printed and reviewed with patient.      Discharged ambulatory in no apparent distress, with male significant other.    For Pharmacy Admin Tracking Only    Intervention Detail: Refill(s) Provided  Total # of Interventions Recommended:

## 2025-08-25 ENCOUNTER — ANTI-COAG VISIT (OUTPATIENT)
Age: 75
End: 2025-08-25
Payer: MEDICARE

## 2025-08-25 DIAGNOSIS — R06.02 SOB (SHORTNESS OF BREATH): ICD-10-CM

## 2025-08-25 DIAGNOSIS — Z51.81 ENCOUNTER FOR THERAPEUTIC DRUG MONITORING: ICD-10-CM

## 2025-08-25 DIAGNOSIS — Z79.01 ANTICOAGULATED ON COUMADIN: ICD-10-CM

## 2025-08-25 DIAGNOSIS — I48.0 PAF (PAROXYSMAL ATRIAL FIBRILLATION) (HCC): Primary | ICD-10-CM

## 2025-08-25 LAB — POC INR: 2 (ref 0.8–1.2)

## 2025-08-25 PROCEDURE — 99211 OFF/OP EST MAY X REQ PHY/QHP: CPT

## 2025-08-25 PROCEDURE — 85610 PROTHROMBIN TIME: CPT

## (undated) DEVICE — CATHETER ETER IV 22GA L1IN POLYUR STR RADPQ INTROCAN SFTY

## (undated) DEVICE — SET LNR RED GRN W/ BASE CLEANASCOPE

## (undated) DEVICE — SOLUTION IV 1000ML 0.45% SOD CHL PH 5 INJ USP VIAFLX PLAS

## (undated) DEVICE — IV START KIT: Brand: MEDLINE INDUSTRIES, INC.

## (undated) DEVICE — GLOVE ORANGE PI 7 1/2   MSG9075

## (undated) DEVICE — SNARE POLYP SM W13MMXL240CM SHTH DIA2.4MM OVL FLX DISP

## (undated) DEVICE — FORCEPS BX L240CM JAW DIA3.2MM L CAP W/ NDL MIC MESH TOOTH

## (undated) DEVICE — ENDO KIT: Brand: MEDLINE INDUSTRIES, INC.

## (undated) DEVICE — SET ADMIN 25ML L117IN PMP MOD CK VLV RLER CLMP 2 SMRTSITE

## (undated) DEVICE — YANKAUER,BULB TIP,W/O VENT,RIGID,STERILE: Brand: MEDLINE

## (undated) DEVICE — TUBING IV STOPCOCK 48 CM 3 W

## (undated) DEVICE — SUREFIT, DUAL DISPERSIVE ELECTRODE, CONTACT QUALITY MONITOR: Brand: SUREFIT